# Patient Record
Sex: MALE | Race: WHITE | NOT HISPANIC OR LATINO | Employment: FULL TIME | URBAN - METROPOLITAN AREA
[De-identification: names, ages, dates, MRNs, and addresses within clinical notes are randomized per-mention and may not be internally consistent; named-entity substitution may affect disease eponyms.]

---

## 2019-02-05 ENCOUNTER — OFFICE VISIT - HEALTHEAST (OUTPATIENT)
Dept: FAMILY MEDICINE | Facility: CLINIC | Age: 49
End: 2019-02-05

## 2019-02-05 DIAGNOSIS — Z12.11 SCREEN FOR COLON CANCER: ICD-10-CM
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
## 2019-02-05 DIAGNOSIS — S22.31XD CLOSED FRACTURE OF ONE RIB OF RIGHT SIDE WITH ROUTINE HEALING, SUBSEQUENT ENCOUNTER: ICD-10-CM

## 2019-02-05 DIAGNOSIS — G89.29 CHRONIC LOW BACK PAIN, UNSPECIFIED BACK PAIN LATERALITY, WITH SCIATICA PRESENCE UNSPECIFIED: ICD-10-CM

## 2019-02-05 DIAGNOSIS — M54.5 CHRONIC LOW BACK PAIN, UNSPECIFIED BACK PAIN LATERALITY, WITH SCIATICA PRESENCE UNSPECIFIED: ICD-10-CM

## 2019-02-05 ASSESSMENT — MIFFLIN-ST. JEOR: SCORE: 2490.33

## 2019-02-06 ENCOUNTER — COMMUNICATION - HEALTHEAST (OUTPATIENT)
Dept: INTERNAL MEDICINE | Facility: CLINIC | Age: 49
End: 2019-02-06

## 2019-02-13 ENCOUNTER — AMBULATORY - HEALTHEAST (OUTPATIENT)
Dept: FAMILY MEDICINE | Facility: CLINIC | Age: 49
End: 2019-02-13

## 2019-02-13 ENCOUNTER — OFFICE VISIT - HEALTHEAST (OUTPATIENT)
Dept: FAMILY MEDICINE | Facility: CLINIC | Age: 49
End: 2019-02-13

## 2019-02-13 DIAGNOSIS — R07.9 ACUTE CHEST PAIN: ICD-10-CM

## 2019-02-13 DIAGNOSIS — R06.02 SHORTNESS OF BREATH: ICD-10-CM

## 2019-02-13 DIAGNOSIS — R06.02 SOB (SHORTNESS OF BREATH): ICD-10-CM

## 2019-02-13 DIAGNOSIS — Z13.220 LIPID SCREENING: ICD-10-CM

## 2019-02-13 DIAGNOSIS — S22.31XD CLOSED FRACTURE OF ONE RIB OF RIGHT SIDE WITH ROUTINE HEALING, SUBSEQUENT ENCOUNTER: ICD-10-CM

## 2019-02-13 DIAGNOSIS — Z13.1 SCREENING FOR DIABETES MELLITUS: ICD-10-CM

## 2019-02-13 DIAGNOSIS — I10 ESSENTIAL HYPERTENSION: ICD-10-CM

## 2019-02-13 DIAGNOSIS — E66.01 MORBID OBESITY (H): ICD-10-CM

## 2019-02-13 LAB
ANION GAP SERPL CALCULATED.3IONS-SCNC: 7 MMOL/L (ref 5–18)
BUN SERPL-MCNC: 14 MG/DL (ref 8–22)
CALCIUM SERPL-MCNC: 8.8 MG/DL (ref 8.5–10.5)
CHLORIDE BLD-SCNC: 102 MMOL/L (ref 98–107)
CHOLEST SERPL-MCNC: 190 MG/DL
CO2 SERPL-SCNC: 31 MMOL/L (ref 22–31)
CREAT SERPL-MCNC: 1.11 MG/DL (ref 0.7–1.3)
D DIMER PPP FEU-MCNC: 0.86 FEU UG/ML
FASTING STATUS PATIENT QL REPORTED: NO
GFR SERPL CREATININE-BSD FRML MDRD: >60 ML/MIN/1.73M2
GLUCOSE BLD-MCNC: 89 MG/DL (ref 70–125)
HBA1C MFR BLD: 5.4 % (ref 3.5–6)
HDLC SERPL-MCNC: 39 MG/DL
HGB BLD-MCNC: 13.7 G/DL (ref 14–18)
LDLC SERPL CALC-MCNC: 100 MG/DL
POTASSIUM BLD-SCNC: 3.9 MMOL/L (ref 3.5–5)
SODIUM SERPL-SCNC: 140 MMOL/L (ref 136–145)
TRIGL SERPL-MCNC: 253 MG/DL
TROPONIN I SERPL-MCNC: <0.01 NG/ML (ref 0–0.29)

## 2019-02-14 ENCOUNTER — COMMUNICATION - HEALTHEAST (OUTPATIENT)
Dept: FAMILY MEDICINE | Facility: CLINIC | Age: 49
End: 2019-02-14

## 2019-02-14 LAB
ATRIAL RATE - MUSE: 64 BPM
DIASTOLIC BLOOD PRESSURE - MUSE: NORMAL MMHG
INTERPRETATION ECG - MUSE: NORMAL
P AXIS - MUSE: 36 DEGREES
PR INTERVAL - MUSE: 192 MS
QRS DURATION - MUSE: 98 MS
QT - MUSE: 400 MS
QTC - MUSE: 412 MS
R AXIS - MUSE: -6 DEGREES
SYSTOLIC BLOOD PRESSURE - MUSE: NORMAL MMHG
T AXIS - MUSE: -3 DEGREES
VENTRICULAR RATE- MUSE: 64 BPM

## 2019-02-15 ENCOUNTER — HOSPITAL ENCOUNTER (OUTPATIENT)
Dept: CT IMAGING | Facility: HOSPITAL | Age: 49
Discharge: HOME | End: 2019-02-15

## 2019-02-15 ENCOUNTER — COMMUNICATION - HEALTHEAST (OUTPATIENT)
Dept: FAMILY MEDICINE | Facility: CLINIC | Age: 49
End: 2019-02-15

## 2019-02-15 DIAGNOSIS — R06.02 SOB (SHORTNESS OF BREATH): ICD-10-CM

## 2019-02-15 DIAGNOSIS — R07.9 ACUTE CHEST PAIN: ICD-10-CM

## 2019-02-18 ENCOUNTER — COMMUNICATION - HEALTHEAST (OUTPATIENT)
Dept: FAMILY MEDICINE | Facility: CLINIC | Age: 49
End: 2019-02-18

## 2019-02-19 ENCOUNTER — OFFICE VISIT - HEALTHEAST (OUTPATIENT)
Dept: FAMILY MEDICINE | Facility: CLINIC | Age: 49
End: 2019-02-19

## 2019-02-19 ENCOUNTER — COMMUNICATION - HEALTHEAST (OUTPATIENT)
Dept: FAMILY MEDICINE | Facility: CLINIC | Age: 49
End: 2019-02-19

## 2019-02-19 DIAGNOSIS — G89.29 CHRONIC LOW BACK PAIN, UNSPECIFIED BACK PAIN LATERALITY, WITH SCIATICA PRESENCE UNSPECIFIED: ICD-10-CM

## 2019-02-19 DIAGNOSIS — K21.9 GASTROESOPHAGEAL REFLUX DISEASE, ESOPHAGITIS PRESENCE NOT SPECIFIED: ICD-10-CM

## 2019-02-19 DIAGNOSIS — Z02.89 ENCOUNTER FOR COMPLETION OF FORM WITH PATIENT: ICD-10-CM

## 2019-02-19 DIAGNOSIS — J45.20 MILD INTERMITTENT ASTHMA WITHOUT COMPLICATION: ICD-10-CM

## 2019-02-19 DIAGNOSIS — K43.9 VENTRAL HERNIA WITHOUT OBSTRUCTION OR GANGRENE: ICD-10-CM

## 2019-02-19 DIAGNOSIS — S22.41XD CLOSED FRACTURE OF MULTIPLE RIBS OF RIGHT SIDE WITH ROUTINE HEALING, SUBSEQUENT ENCOUNTER: ICD-10-CM

## 2019-02-19 DIAGNOSIS — M54.5 CHRONIC LOW BACK PAIN, UNSPECIFIED BACK PAIN LATERALITY, WITH SCIATICA PRESENCE UNSPECIFIED: ICD-10-CM

## 2019-02-23 ENCOUNTER — COMMUNICATION - HEALTHEAST (OUTPATIENT)
Dept: FAMILY MEDICINE | Facility: CLINIC | Age: 49
End: 2019-02-23

## 2019-02-27 ENCOUNTER — COMMUNICATION - HEALTHEAST (OUTPATIENT)
Dept: FAMILY MEDICINE | Facility: CLINIC | Age: 49
End: 2019-02-27

## 2019-02-28 ENCOUNTER — COMMUNICATION - HEALTHEAST (OUTPATIENT)
Dept: FAMILY MEDICINE | Facility: CLINIC | Age: 49
End: 2019-02-28

## 2019-02-28 DIAGNOSIS — S22.41XD CLOSED FRACTURE OF MULTIPLE RIBS OF RIGHT SIDE WITH ROUTINE HEALING, SUBSEQUENT ENCOUNTER: ICD-10-CM

## 2019-03-13 ENCOUNTER — COMMUNICATION - HEALTHEAST (OUTPATIENT)
Dept: FAMILY MEDICINE | Facility: CLINIC | Age: 49
End: 2019-03-13

## 2019-03-13 ENCOUNTER — OFFICE VISIT - HEALTHEAST (OUTPATIENT)
Dept: FAMILY MEDICINE | Facility: CLINIC | Age: 49
End: 2019-03-13

## 2019-03-13 DIAGNOSIS — Z12.11 SCREEN FOR COLON CANCER: ICD-10-CM

## 2019-03-13 DIAGNOSIS — G89.29 CHRONIC LOW BACK PAIN, UNSPECIFIED BACK PAIN LATERALITY, WITH SCIATICA PRESENCE UNSPECIFIED: ICD-10-CM

## 2019-03-13 DIAGNOSIS — F33.1 MODERATE EPISODE OF RECURRENT MAJOR DEPRESSIVE DISORDER (H): ICD-10-CM

## 2019-03-13 DIAGNOSIS — F41.0 PANIC ATTACK: ICD-10-CM

## 2019-03-13 DIAGNOSIS — F41.9 ANXIETY: ICD-10-CM

## 2019-03-13 DIAGNOSIS — M54.5 CHRONIC LOW BACK PAIN, UNSPECIFIED BACK PAIN LATERALITY, WITH SCIATICA PRESENCE UNSPECIFIED: ICD-10-CM

## 2019-03-13 DIAGNOSIS — S22.41XD CLOSED FRACTURE OF MULTIPLE RIBS OF RIGHT SIDE WITH ROUTINE HEALING, SUBSEQUENT ENCOUNTER: ICD-10-CM

## 2019-03-13 ASSESSMENT — MIFFLIN-ST. JEOR: SCORE: 2466.52

## 2019-03-23 ENCOUNTER — HOSPITAL ENCOUNTER (OUTPATIENT)
Dept: MRI IMAGING | Facility: HOSPITAL | Age: 49
Discharge: HOME | End: 2019-03-23
Attending: FAMILY MEDICINE

## 2019-03-23 DIAGNOSIS — M54.5 CHRONIC LOW BACK PAIN, UNSPECIFIED BACK PAIN LATERALITY, WITH SCIATICA PRESENCE UNSPECIFIED: ICD-10-CM

## 2019-03-23 DIAGNOSIS — G89.29 CHRONIC LOW BACK PAIN, UNSPECIFIED BACK PAIN LATERALITY, WITH SCIATICA PRESENCE UNSPECIFIED: ICD-10-CM

## 2019-03-26 ENCOUNTER — RECORDS - HEALTHEAST (OUTPATIENT)
Dept: ADMINISTRATIVE | Facility: OTHER | Age: 49
End: 2019-03-26

## 2019-03-26 ENCOUNTER — HOSPITAL ENCOUNTER (OUTPATIENT)
Dept: PHYSICAL MEDICINE AND REHAB | Facility: CLINIC | Age: 49
Discharge: HOME | End: 2019-03-26
Attending: FAMILY MEDICINE

## 2019-03-26 DIAGNOSIS — M51.26 LUMBAR DISC HERNIATION: ICD-10-CM

## 2019-03-26 DIAGNOSIS — G89.4 CHRONIC PAIN SYNDROME: ICD-10-CM

## 2019-03-26 DIAGNOSIS — M47.816 SPONDYLOSIS OF LUMBAR REGION WITHOUT MYELOPATHY OR RADICULOPATHY: ICD-10-CM

## 2019-03-26 DIAGNOSIS — M54.16 LUMBAR RADICULITIS: ICD-10-CM

## 2019-03-26 ASSESSMENT — MIFFLIN-ST. JEOR: SCORE: 2440.1

## 2019-04-10 ENCOUNTER — COMMUNICATION - HEALTHEAST (OUTPATIENT)
Dept: FAMILY MEDICINE | Facility: CLINIC | Age: 49
End: 2019-04-10

## 2019-04-10 DIAGNOSIS — F41.9 ANXIETY: ICD-10-CM

## 2019-04-17 ENCOUNTER — COMMUNICATION - HEALTHEAST (OUTPATIENT)
Dept: FAMILY MEDICINE | Facility: CLINIC | Age: 49
End: 2019-04-17

## 2019-04-17 DIAGNOSIS — M54.5 CHRONIC LOW BACK PAIN, UNSPECIFIED BACK PAIN LATERALITY, WITH SCIATICA PRESENCE UNSPECIFIED: ICD-10-CM

## 2019-04-17 DIAGNOSIS — S22.41XD CLOSED FRACTURE OF MULTIPLE RIBS OF RIGHT SIDE WITH ROUTINE HEALING, SUBSEQUENT ENCOUNTER: ICD-10-CM

## 2019-04-17 DIAGNOSIS — G89.29 CHRONIC LOW BACK PAIN, UNSPECIFIED BACK PAIN LATERALITY, WITH SCIATICA PRESENCE UNSPECIFIED: ICD-10-CM

## 2019-04-26 ENCOUNTER — COMMUNICATION - HEALTHEAST (OUTPATIENT)
Dept: FAMILY MEDICINE | Facility: CLINIC | Age: 49
End: 2019-04-26

## 2019-04-26 DIAGNOSIS — G89.29 CHRONIC LOW BACK PAIN, UNSPECIFIED BACK PAIN LATERALITY, WITH SCIATICA PRESENCE UNSPECIFIED: ICD-10-CM

## 2019-04-26 DIAGNOSIS — M54.5 CHRONIC LOW BACK PAIN, UNSPECIFIED BACK PAIN LATERALITY, WITH SCIATICA PRESENCE UNSPECIFIED: ICD-10-CM

## 2019-04-26 DIAGNOSIS — S22.41XD CLOSED FRACTURE OF MULTIPLE RIBS OF RIGHT SIDE WITH ROUTINE HEALING, SUBSEQUENT ENCOUNTER: ICD-10-CM

## 2019-04-28 ENCOUNTER — COMMUNICATION - HEALTHEAST (OUTPATIENT)
Dept: FAMILY MEDICINE | Facility: CLINIC | Age: 49
End: 2019-04-28

## 2019-04-28 DIAGNOSIS — G89.29 CHRONIC LOW BACK PAIN, UNSPECIFIED BACK PAIN LATERALITY, WITH SCIATICA PRESENCE UNSPECIFIED: ICD-10-CM

## 2019-04-28 DIAGNOSIS — M54.5 CHRONIC LOW BACK PAIN, UNSPECIFIED BACK PAIN LATERALITY, WITH SCIATICA PRESENCE UNSPECIFIED: ICD-10-CM

## 2019-04-28 DIAGNOSIS — S22.41XD CLOSED FRACTURE OF MULTIPLE RIBS OF RIGHT SIDE WITH ROUTINE HEALING, SUBSEQUENT ENCOUNTER: ICD-10-CM

## 2019-05-21 ENCOUNTER — COMMUNICATION - HEALTHEAST (OUTPATIENT)
Dept: FAMILY MEDICINE | Facility: CLINIC | Age: 49
End: 2019-05-21

## 2019-11-29 ENCOUNTER — OFFICE VISIT - HEALTHEAST (OUTPATIENT)
Dept: FAMILY MEDICINE | Facility: CLINIC | Age: 49
End: 2019-11-29

## 2019-11-29 DIAGNOSIS — F41.9 ANXIETY: ICD-10-CM

## 2019-11-29 DIAGNOSIS — K21.9 GASTROESOPHAGEAL REFLUX DISEASE, ESOPHAGITIS PRESENCE NOT SPECIFIED: ICD-10-CM

## 2019-11-29 DIAGNOSIS — M54.16 LUMBAR RADICULITIS: ICD-10-CM

## 2019-11-29 DIAGNOSIS — Z23 ENCOUNTER FOR ADMINISTRATION OF VACCINE: ICD-10-CM

## 2019-11-29 DIAGNOSIS — M47.816 SPONDYLOSIS OF LUMBAR REGION WITHOUT MYELOPATHY OR RADICULOPATHY: ICD-10-CM

## 2019-11-29 DIAGNOSIS — J45.20 MILD INTERMITTENT ASTHMA WITHOUT COMPLICATION: ICD-10-CM

## 2019-11-29 DIAGNOSIS — E66.01 MORBID OBESITY (H): ICD-10-CM

## 2019-11-29 DIAGNOSIS — G25.81 RESTLESS LEGS SYNDROME (RLS): ICD-10-CM

## 2019-11-29 DIAGNOSIS — H65.92 OME (OTITIS MEDIA WITH EFFUSION), LEFT: ICD-10-CM

## 2019-11-29 DIAGNOSIS — M51.26 LUMBAR DISC HERNIATION: ICD-10-CM

## 2019-11-29 DIAGNOSIS — I10 ESSENTIAL HYPERTENSION: ICD-10-CM

## 2019-11-29 ASSESSMENT — ANXIETY QUESTIONNAIRES
6. BECOMING EASILY ANNOYED OR IRRITABLE: NEARLY EVERY DAY
3. WORRYING TOO MUCH ABOUT DIFFERENT THINGS: NEARLY EVERY DAY
1. FEELING NERVOUS, ANXIOUS, OR ON EDGE: NEARLY EVERY DAY
4. TROUBLE RELAXING: NEARLY EVERY DAY
2. NOT BEING ABLE TO STOP OR CONTROL WORRYING: NEARLY EVERY DAY
5. BEING SO RESTLESS THAT IT IS HARD TO SIT STILL: NEARLY EVERY DAY
7. FEELING AFRAID AS IF SOMETHING AWFUL MIGHT HAPPEN: MORE THAN HALF THE DAYS
GAD7 TOTAL SCORE: 20

## 2019-11-29 ASSESSMENT — PATIENT HEALTH QUESTIONNAIRE - PHQ9: SUM OF ALL RESPONSES TO PHQ QUESTIONS 1-9: 22

## 2019-12-05 ENCOUNTER — AMBULATORY - HEALTHEAST (OUTPATIENT)
Dept: LAB | Facility: CLINIC | Age: 49
End: 2019-12-05

## 2019-12-05 ENCOUNTER — AMBULATORY - HEALTHEAST (OUTPATIENT)
Dept: NURSING | Facility: CLINIC | Age: 49
End: 2019-12-05

## 2019-12-05 DIAGNOSIS — I10 ESSENTIAL HYPERTENSION: ICD-10-CM

## 2019-12-05 LAB
ANION GAP SERPL CALCULATED.3IONS-SCNC: 9 MMOL/L (ref 5–18)
BUN SERPL-MCNC: 13 MG/DL (ref 8–22)
CALCIUM SERPL-MCNC: 9.4 MG/DL (ref 8.5–10.5)
CHLORIDE BLD-SCNC: 103 MMOL/L (ref 98–107)
CO2 SERPL-SCNC: 28 MMOL/L (ref 22–31)
CREAT SERPL-MCNC: 1.25 MG/DL (ref 0.7–1.3)
GFR SERPL CREATININE-BSD FRML MDRD: >60 ML/MIN/1.73M2
GLUCOSE BLD-MCNC: 103 MG/DL (ref 70–125)
POTASSIUM BLD-SCNC: 3.9 MMOL/L (ref 3.5–5)
SODIUM SERPL-SCNC: 140 MMOL/L (ref 136–145)

## 2020-01-08 ENCOUNTER — COMMUNICATION - HEALTHEAST (OUTPATIENT)
Dept: FAMILY MEDICINE | Facility: CLINIC | Age: 50
End: 2020-01-08

## 2020-01-08 DIAGNOSIS — M47.816 SPONDYLOSIS OF LUMBAR REGION WITHOUT MYELOPATHY OR RADICULOPATHY: ICD-10-CM

## 2020-01-08 DIAGNOSIS — M54.16 LUMBAR RADICULITIS: ICD-10-CM

## 2020-01-08 DIAGNOSIS — M51.26 LUMBAR DISC HERNIATION: ICD-10-CM

## 2020-01-16 ENCOUNTER — COMMUNICATION - HEALTHEAST (OUTPATIENT)
Dept: FAMILY MEDICINE | Facility: CLINIC | Age: 50
End: 2020-01-16

## 2020-01-16 DIAGNOSIS — G25.81 RESTLESS LEGS SYNDROME (RLS): ICD-10-CM

## 2020-01-30 ENCOUNTER — COMMUNICATION - HEALTHEAST (OUTPATIENT)
Dept: FAMILY MEDICINE | Facility: CLINIC | Age: 50
End: 2020-01-30

## 2020-01-30 DIAGNOSIS — M51.26 LUMBAR DISC HERNIATION: ICD-10-CM

## 2020-01-30 DIAGNOSIS — M54.16 LUMBAR RADICULITIS: ICD-10-CM

## 2020-01-30 DIAGNOSIS — M47.816 SPONDYLOSIS OF LUMBAR REGION WITHOUT MYELOPATHY OR RADICULOPATHY: ICD-10-CM

## 2020-03-02 ENCOUNTER — COMMUNICATION - HEALTHEAST (OUTPATIENT)
Dept: FAMILY MEDICINE | Facility: CLINIC | Age: 50
End: 2020-03-02

## 2020-03-02 DIAGNOSIS — M47.816 SPONDYLOSIS OF LUMBAR REGION WITHOUT MYELOPATHY OR RADICULOPATHY: ICD-10-CM

## 2020-03-02 DIAGNOSIS — M54.16 LUMBAR RADICULITIS: ICD-10-CM

## 2020-03-02 DIAGNOSIS — M51.26 LUMBAR DISC HERNIATION: ICD-10-CM

## 2020-04-02 ENCOUNTER — COMMUNICATION - HEALTHEAST (OUTPATIENT)
Dept: FAMILY MEDICINE | Facility: CLINIC | Age: 50
End: 2020-04-02

## 2020-04-02 DIAGNOSIS — M47.816 SPONDYLOSIS OF LUMBAR REGION WITHOUT MYELOPATHY OR RADICULOPATHY: ICD-10-CM

## 2020-04-02 DIAGNOSIS — M51.26 LUMBAR DISC HERNIATION: ICD-10-CM

## 2020-04-02 DIAGNOSIS — M54.16 LUMBAR RADICULITIS: ICD-10-CM

## 2020-04-10 ENCOUNTER — COMMUNICATION - HEALTHEAST (OUTPATIENT)
Dept: FAMILY MEDICINE | Facility: CLINIC | Age: 50
End: 2020-04-10

## 2020-04-10 DIAGNOSIS — M51.26 LUMBAR DISC HERNIATION: ICD-10-CM

## 2020-04-10 DIAGNOSIS — M47.816 SPONDYLOSIS OF LUMBAR REGION WITHOUT MYELOPATHY OR RADICULOPATHY: ICD-10-CM

## 2020-04-10 DIAGNOSIS — M54.16 LUMBAR RADICULITIS: ICD-10-CM

## 2020-05-11 ENCOUNTER — COMMUNICATION - HEALTHEAST (OUTPATIENT)
Dept: FAMILY MEDICINE | Facility: CLINIC | Age: 50
End: 2020-05-11

## 2020-05-11 DIAGNOSIS — M54.16 LUMBAR RADICULITIS: ICD-10-CM

## 2020-05-11 DIAGNOSIS — M51.26 LUMBAR DISC HERNIATION: ICD-10-CM

## 2020-05-11 DIAGNOSIS — M47.816 SPONDYLOSIS OF LUMBAR REGION WITHOUT MYELOPATHY OR RADICULOPATHY: ICD-10-CM

## 2020-06-12 ENCOUNTER — COMMUNICATION - HEALTHEAST (OUTPATIENT)
Dept: FAMILY MEDICINE | Facility: CLINIC | Age: 50
End: 2020-06-12

## 2020-06-12 DIAGNOSIS — M47.816 SPONDYLOSIS OF LUMBAR REGION WITHOUT MYELOPATHY OR RADICULOPATHY: ICD-10-CM

## 2020-06-12 DIAGNOSIS — M54.16 LUMBAR RADICULITIS: ICD-10-CM

## 2020-06-12 DIAGNOSIS — M51.26 LUMBAR DISC HERNIATION: ICD-10-CM

## 2020-06-14 ENCOUNTER — COMMUNICATION - HEALTHEAST (OUTPATIENT)
Dept: FAMILY MEDICINE | Facility: CLINIC | Age: 50
End: 2020-06-14

## 2020-06-14 DIAGNOSIS — M54.16 LUMBAR RADICULITIS: ICD-10-CM

## 2020-06-14 DIAGNOSIS — M51.26 LUMBAR DISC HERNIATION: ICD-10-CM

## 2020-06-14 DIAGNOSIS — M47.816 SPONDYLOSIS OF LUMBAR REGION WITHOUT MYELOPATHY OR RADICULOPATHY: ICD-10-CM

## 2020-09-10 ENCOUNTER — COMMUNICATION - HEALTHEAST (OUTPATIENT)
Dept: FAMILY MEDICINE | Facility: CLINIC | Age: 50
End: 2020-09-10

## 2020-09-10 ENCOUNTER — OFFICE VISIT - HEALTHEAST (OUTPATIENT)
Dept: FAMILY MEDICINE | Facility: CLINIC | Age: 50
End: 2020-09-10

## 2020-09-10 DIAGNOSIS — F41.9 ANXIETY: ICD-10-CM

## 2020-09-10 DIAGNOSIS — Z13.1 DIABETES MELLITUS SCREENING: ICD-10-CM

## 2020-09-10 DIAGNOSIS — F33.2 SEVERE EPISODE OF RECURRENT MAJOR DEPRESSIVE DISORDER, WITHOUT PSYCHOTIC FEATURES (H): ICD-10-CM

## 2020-09-10 DIAGNOSIS — Z13.220 LIPID SCREENING: ICD-10-CM

## 2020-09-10 DIAGNOSIS — M79.7 FIBROMYALGIA: ICD-10-CM

## 2020-09-10 DIAGNOSIS — M54.16 LUMBAR RADICULITIS: ICD-10-CM

## 2020-09-10 DIAGNOSIS — K64.9 BLEEDING HEMORRHOID: ICD-10-CM

## 2020-09-10 DIAGNOSIS — M54.50 CHRONIC LOW BACK PAIN, UNSPECIFIED BACK PAIN LATERALITY, UNSPECIFIED WHETHER SCIATICA PRESENT: ICD-10-CM

## 2020-09-10 DIAGNOSIS — E66.01 MORBID OBESITY (H): ICD-10-CM

## 2020-09-10 DIAGNOSIS — M47.816 SPONDYLOSIS OF LUMBAR REGION WITHOUT MYELOPATHY OR RADICULOPATHY: ICD-10-CM

## 2020-09-10 DIAGNOSIS — Z86.2 HISTORY OF ANEMIA: ICD-10-CM

## 2020-09-10 DIAGNOSIS — Z91.51 HX OF SUICIDE ATTEMPT: ICD-10-CM

## 2020-09-10 DIAGNOSIS — I10 ESSENTIAL HYPERTENSION: ICD-10-CM

## 2020-09-10 DIAGNOSIS — J45.20 MILD INTERMITTENT ASTHMA WITHOUT COMPLICATION: ICD-10-CM

## 2020-09-10 DIAGNOSIS — M51.26 LUMBAR DISC HERNIATION: ICD-10-CM

## 2020-09-10 DIAGNOSIS — F43.10 PTSD (POST-TRAUMATIC STRESS DISORDER): ICD-10-CM

## 2020-09-10 DIAGNOSIS — G89.29 CHRONIC LOW BACK PAIN, UNSPECIFIED BACK PAIN LATERALITY, UNSPECIFIED WHETHER SCIATICA PRESENT: ICD-10-CM

## 2020-09-10 DIAGNOSIS — M75.42 IMPINGEMENT SYNDROME OF SHOULDER REGION, LEFT: ICD-10-CM

## 2020-09-10 DIAGNOSIS — Z78.9 ALCOHOL USE: ICD-10-CM

## 2020-09-10 DIAGNOSIS — Z00.00 ROUTINE GENERAL MEDICAL EXAMINATION AT A HEALTH CARE FACILITY: ICD-10-CM

## 2020-09-10 LAB
ALBUMIN SERPL-MCNC: 4.2 G/DL (ref 3.5–5)
ALP SERPL-CCNC: 81 U/L (ref 45–120)
ALT SERPL W P-5'-P-CCNC: 24 U/L (ref 0–45)
ANION GAP SERPL CALCULATED.3IONS-SCNC: 8 MMOL/L (ref 5–18)
AST SERPL W P-5'-P-CCNC: 23 U/L (ref 0–40)
BILIRUB SERPL-MCNC: 0.4 MG/DL (ref 0–1)
BUN SERPL-MCNC: 10 MG/DL (ref 8–22)
CALCIUM SERPL-MCNC: 9.3 MG/DL (ref 8.5–10.5)
CHLORIDE BLD-SCNC: 103 MMOL/L (ref 98–107)
CHOLEST SERPL-MCNC: 202 MG/DL
CO2 SERPL-SCNC: 27 MMOL/L (ref 22–31)
CREAT SERPL-MCNC: 1.08 MG/DL (ref 0.7–1.3)
FASTING STATUS PATIENT QL REPORTED: YES
GFR SERPL CREATININE-BSD FRML MDRD: >60 ML/MIN/1.73M2
GLUCOSE BLD-MCNC: 106 MG/DL (ref 70–125)
HDLC SERPL-MCNC: 43 MG/DL
HGB BLD-MCNC: 15.8 G/DL (ref 14–18)
LDLC SERPL CALC-MCNC: 125 MG/DL
POTASSIUM BLD-SCNC: 4.1 MMOL/L (ref 3.5–5)
PROT SERPL-MCNC: 6.9 G/DL (ref 6–8)
SODIUM SERPL-SCNC: 138 MMOL/L (ref 136–145)
TRIGL SERPL-MCNC: 172 MG/DL

## 2020-09-10 ASSESSMENT — ANXIETY QUESTIONNAIRES
2. NOT BEING ABLE TO STOP OR CONTROL WORRYING: NEARLY EVERY DAY
1. FEELING NERVOUS, ANXIOUS, OR ON EDGE: NEARLY EVERY DAY
5. BEING SO RESTLESS THAT IT IS HARD TO SIT STILL: NEARLY EVERY DAY
6. BECOMING EASILY ANNOYED OR IRRITABLE: NEARLY EVERY DAY
GAD7 TOTAL SCORE: 21
3. WORRYING TOO MUCH ABOUT DIFFERENT THINGS: NEARLY EVERY DAY
7. FEELING AFRAID AS IF SOMETHING AWFUL MIGHT HAPPEN: NEARLY EVERY DAY
4. TROUBLE RELAXING: NEARLY EVERY DAY

## 2020-09-10 ASSESSMENT — MIFFLIN-ST. JEOR: SCORE: 2426.65

## 2020-09-10 ASSESSMENT — PATIENT HEALTH QUESTIONNAIRE - PHQ9: SUM OF ALL RESPONSES TO PHQ QUESTIONS 1-9: 26

## 2020-09-17 ENCOUNTER — OFFICE VISIT - HEALTHEAST (OUTPATIENT)
Dept: FAMILY MEDICINE | Facility: CLINIC | Age: 50
End: 2020-09-17

## 2020-09-17 DIAGNOSIS — I10 ESSENTIAL HYPERTENSION: ICD-10-CM

## 2020-09-17 RX ORDER — LISINOPRIL 20 MG/1
20 TABLET ORAL DAILY
Qty: 30 TABLET | Refills: 11 | Status: SHIPPED | OUTPATIENT
Start: 2020-09-17 | End: 2021-10-06

## 2020-09-24 ENCOUNTER — AMBULATORY - HEALTHEAST (OUTPATIENT)
Dept: NURSING | Facility: CLINIC | Age: 50
End: 2020-09-24

## 2020-09-24 DIAGNOSIS — I10 ESSENTIAL HYPERTENSION: ICD-10-CM

## 2020-10-22 ENCOUNTER — COMMUNICATION - HEALTHEAST (OUTPATIENT)
Dept: FAMILY MEDICINE | Facility: CLINIC | Age: 50
End: 2020-10-22

## 2020-10-22 DIAGNOSIS — M47.816 SPONDYLOSIS OF LUMBAR REGION WITHOUT MYELOPATHY OR RADICULOPATHY: ICD-10-CM

## 2020-10-22 DIAGNOSIS — M51.26 LUMBAR DISC HERNIATION: ICD-10-CM

## 2020-10-22 DIAGNOSIS — M54.16 LUMBAR RADICULITIS: ICD-10-CM

## 2020-11-10 ENCOUNTER — COMMUNICATION - HEALTHEAST (OUTPATIENT)
Dept: FAMILY MEDICINE | Facility: CLINIC | Age: 50
End: 2020-11-10

## 2020-11-10 ENCOUNTER — OFFICE VISIT - HEALTHEAST (OUTPATIENT)
Dept: FAMILY MEDICINE | Facility: CLINIC | Age: 50
End: 2020-11-10

## 2020-11-10 DIAGNOSIS — M51.26 LUMBAR DISC HERNIATION: ICD-10-CM

## 2020-11-10 DIAGNOSIS — F33.2 SEVERE EPISODE OF RECURRENT MAJOR DEPRESSIVE DISORDER, WITHOUT PSYCHOTIC FEATURES (H): ICD-10-CM

## 2020-11-10 DIAGNOSIS — M54.16 LUMBAR RADICULITIS: ICD-10-CM

## 2020-11-10 DIAGNOSIS — M79.7 FIBROMYALGIA: ICD-10-CM

## 2020-11-10 DIAGNOSIS — M47.816 SPONDYLOSIS OF LUMBAR REGION WITHOUT MYELOPATHY OR RADICULOPATHY: ICD-10-CM

## 2020-11-10 DIAGNOSIS — M54.50 CHRONIC LOW BACK PAIN, UNSPECIFIED BACK PAIN LATERALITY, UNSPECIFIED WHETHER SCIATICA PRESENT: ICD-10-CM

## 2020-11-10 DIAGNOSIS — G89.29 CHRONIC LOW BACK PAIN, UNSPECIFIED BACK PAIN LATERALITY, UNSPECIFIED WHETHER SCIATICA PRESENT: ICD-10-CM

## 2020-11-10 DIAGNOSIS — F43.10 PTSD (POST-TRAUMATIC STRESS DISORDER): ICD-10-CM

## 2020-11-10 DIAGNOSIS — I10 ESSENTIAL HYPERTENSION: ICD-10-CM

## 2020-11-10 DIAGNOSIS — F41.9 ANXIETY: ICD-10-CM

## 2020-11-10 DIAGNOSIS — Z91.51 HX OF SUICIDE ATTEMPT: ICD-10-CM

## 2020-11-10 LAB
ANION GAP SERPL CALCULATED.3IONS-SCNC: 10 MMOL/L (ref 5–18)
BUN SERPL-MCNC: 14 MG/DL (ref 8–22)
CALCIUM SERPL-MCNC: 9.5 MG/DL (ref 8.5–10.5)
CHLORIDE BLD-SCNC: 103 MMOL/L (ref 98–107)
CO2 SERPL-SCNC: 27 MMOL/L (ref 22–31)
CREAT SERPL-MCNC: 1.35 MG/DL (ref 0.7–1.3)
GFR SERPL CREATININE-BSD FRML MDRD: 56 ML/MIN/1.73M2
GLUCOSE BLD-MCNC: 104 MG/DL (ref 70–125)
POTASSIUM BLD-SCNC: 4 MMOL/L (ref 3.5–5)
SODIUM SERPL-SCNC: 140 MMOL/L (ref 136–145)

## 2020-11-11 ENCOUNTER — AMBULATORY - HEALTHEAST (OUTPATIENT)
Dept: FAMILY MEDICINE | Facility: CLINIC | Age: 50
End: 2020-11-11

## 2020-11-11 ENCOUNTER — COMMUNICATION - HEALTHEAST (OUTPATIENT)
Dept: FAMILY MEDICINE | Facility: CLINIC | Age: 50
End: 2020-11-11

## 2020-11-11 DIAGNOSIS — I10 ESSENTIAL HYPERTENSION: ICD-10-CM

## 2020-11-11 DIAGNOSIS — R79.89 ELEVATED SERUM CREATININE: ICD-10-CM

## 2020-12-16 ENCOUNTER — COMMUNICATION - HEALTHEAST (OUTPATIENT)
Dept: FAMILY MEDICINE | Facility: CLINIC | Age: 50
End: 2020-12-16

## 2020-12-16 DIAGNOSIS — M51.26 LUMBAR DISC HERNIATION: ICD-10-CM

## 2020-12-16 DIAGNOSIS — M47.816 SPONDYLOSIS OF LUMBAR REGION WITHOUT MYELOPATHY OR RADICULOPATHY: ICD-10-CM

## 2020-12-16 DIAGNOSIS — M54.16 LUMBAR RADICULITIS: ICD-10-CM

## 2020-12-18 ENCOUNTER — COMMUNICATION - HEALTHEAST (OUTPATIENT)
Dept: FAMILY MEDICINE | Facility: CLINIC | Age: 50
End: 2020-12-18

## 2020-12-18 DIAGNOSIS — M54.16 LUMBAR RADICULITIS: ICD-10-CM

## 2020-12-18 DIAGNOSIS — M51.26 LUMBAR DISC HERNIATION: ICD-10-CM

## 2020-12-18 DIAGNOSIS — M47.816 SPONDYLOSIS OF LUMBAR REGION WITHOUT MYELOPATHY OR RADICULOPATHY: ICD-10-CM

## 2020-12-24 ENCOUNTER — RECORDS - HEALTHEAST (OUTPATIENT)
Dept: ADMINISTRATIVE | Facility: OTHER | Age: 50
End: 2020-12-24

## 2021-01-06 ENCOUNTER — HOSPITAL ENCOUNTER (OUTPATIENT)
Dept: PALLIATIVE MEDICINE | Facility: OTHER | Age: 51
Discharge: HOME | End: 2021-01-06
Attending: FAMILY MEDICINE

## 2021-01-06 ENCOUNTER — COMMUNICATION - HEALTHEAST (OUTPATIENT)
Dept: FAMILY MEDICINE | Facility: CLINIC | Age: 51
End: 2021-01-06

## 2021-01-06 DIAGNOSIS — M54.16 LUMBAR RADICULITIS: ICD-10-CM

## 2021-01-06 DIAGNOSIS — G89.4 CHRONIC PAIN SYNDROME: ICD-10-CM

## 2021-01-06 DIAGNOSIS — M51.26 LUMBAR DISC HERNIATION: ICD-10-CM

## 2021-01-06 DIAGNOSIS — M47.816 SPONDYLOSIS OF LUMBAR REGION WITHOUT MYELOPATHY OR RADICULOPATHY: ICD-10-CM

## 2021-01-17 ENCOUNTER — COMMUNICATION - HEALTHEAST (OUTPATIENT)
Dept: FAMILY MEDICINE | Facility: CLINIC | Age: 51
End: 2021-01-17

## 2021-01-17 DIAGNOSIS — F41.9 ANXIETY: ICD-10-CM

## 2021-01-20 ENCOUNTER — OFFICE VISIT - HEALTHEAST (OUTPATIENT)
Dept: FAMILY MEDICINE | Facility: CLINIC | Age: 51
End: 2021-01-20

## 2021-01-20 DIAGNOSIS — L40.9 PSORIASIS: ICD-10-CM

## 2021-01-20 DIAGNOSIS — I10 ESSENTIAL HYPERTENSION: ICD-10-CM

## 2021-01-20 DIAGNOSIS — G89.4 CHRONIC PAIN SYNDROME: ICD-10-CM

## 2021-01-20 LAB
ANION GAP SERPL CALCULATED.3IONS-SCNC: 11 MMOL/L (ref 5–18)
BUN SERPL-MCNC: 12 MG/DL (ref 8–22)
C REACTIVE PROTEIN LHE: 1 MG/DL (ref 0–0.8)
CALCIUM SERPL-MCNC: 9.2 MG/DL (ref 8.5–10.5)
CHLORIDE BLD-SCNC: 100 MMOL/L (ref 98–107)
CO2 SERPL-SCNC: 27 MMOL/L (ref 22–31)
CREAT SERPL-MCNC: 1.18 MG/DL (ref 0.7–1.3)
GFR SERPL CREATININE-BSD FRML MDRD: >60 ML/MIN/1.73M2
GLUCOSE BLD-MCNC: 104 MG/DL (ref 70–125)
POTASSIUM BLD-SCNC: 3.8 MMOL/L (ref 3.5–5)
SODIUM SERPL-SCNC: 138 MMOL/L (ref 136–145)
T3FREE SERPL-MCNC: 2.8 PG/ML (ref 1.9–3.9)
T4 FREE SERPL-MCNC: 0.9 NG/DL (ref 0.7–1.8)
TSH SERPL DL<=0.005 MIU/L-ACNC: 1.63 UIU/ML (ref 0.3–5)

## 2021-01-20 RX ORDER — HYDROCHLOROTHIAZIDE 25 MG/1
25 TABLET ORAL DAILY
Qty: 90 TABLET | Refills: 3 | Status: SHIPPED | OUTPATIENT
Start: 2021-01-20 | End: 2022-01-31

## 2021-01-20 ASSESSMENT — MIFFLIN-ST. JEOR: SCORE: 2416.16

## 2021-01-21 ENCOUNTER — COMMUNICATION - HEALTHEAST (OUTPATIENT)
Dept: FAMILY MEDICINE | Facility: CLINIC | Age: 51
End: 2021-01-21

## 2021-01-21 DIAGNOSIS — M51.26 LUMBAR DISC HERNIATION: ICD-10-CM

## 2021-01-21 DIAGNOSIS — M47.816 SPONDYLOSIS OF LUMBAR REGION WITHOUT MYELOPATHY OR RADICULOPATHY: ICD-10-CM

## 2021-01-21 DIAGNOSIS — M54.16 LUMBAR RADICULITIS: ICD-10-CM

## 2021-01-21 LAB — 25(OH)D3 SERPL-MCNC: 7.4 NG/ML (ref 30–80)

## 2021-01-23 LAB — TSH RECEP AB SER-ACNC: <0.9 IU/L

## 2021-01-24 LAB — T3REVERSE SERPL-MCNC: 10.9 NG/DL (ref 9–27)

## 2021-01-26 ENCOUNTER — COMMUNICATION - HEALTHEAST (OUTPATIENT)
Dept: PALLIATIVE MEDICINE | Facility: OTHER | Age: 51
End: 2021-01-26

## 2021-01-26 LAB
DQA1*: NORMAL
DQA1*LOCUS: NORMAL
DQB1* LOCUS: NORMAL
DQB1*: NORMAL
DRSSO COMMENTS: NORMAL
DRSSO TEST METHOD: NORMAL

## 2021-01-27 LAB
GLIADIN IGA SER-ACNC: 1.2 U/ML
GLIADIN IGG SER-ACNC: 0.9 U/ML
IGA SERPL-MCNC: 192 MG/DL (ref 65–400)
TTG IGA SER-ACNC: 0.4 U/ML
TTG IGG SER-ACNC: <0.6 U/ML

## 2021-02-02 ENCOUNTER — OFFICE VISIT - HEALTHEAST (OUTPATIENT)
Dept: FAMILY MEDICINE | Facility: CLINIC | Age: 51
End: 2021-02-02

## 2021-02-02 DIAGNOSIS — S29.011D INTERCOSTAL MUSCLE STRAIN, SUBSEQUENT ENCOUNTER: ICD-10-CM

## 2021-02-12 ENCOUNTER — COMMUNICATION - HEALTHEAST (OUTPATIENT)
Dept: LAB | Facility: CLINIC | Age: 51
End: 2021-02-12

## 2021-02-12 DIAGNOSIS — M47.816 SPONDYLOSIS OF LUMBAR REGION WITHOUT MYELOPATHY OR RADICULOPATHY: ICD-10-CM

## 2021-02-12 DIAGNOSIS — M51.26 LUMBAR DISC HERNIATION: ICD-10-CM

## 2021-02-12 DIAGNOSIS — M54.16 LUMBAR RADICULITIS: ICD-10-CM

## 2021-02-15 ENCOUNTER — HOSPITAL ENCOUNTER (OUTPATIENT)
Dept: PALLIATIVE MEDICINE | Facility: OTHER | Age: 51
Discharge: HOME | End: 2021-02-15
Attending: ANESTHESIOLOGY

## 2021-02-15 ENCOUNTER — COMMUNICATION - HEALTHEAST (OUTPATIENT)
Dept: PALLIATIVE MEDICINE | Facility: OTHER | Age: 51
End: 2021-02-15

## 2021-02-15 DIAGNOSIS — M79.7 FIBROMYALGIA: ICD-10-CM

## 2021-02-15 DIAGNOSIS — M54.50 CHRONIC LOW BACK PAIN, UNSPECIFIED BACK PAIN LATERALITY, UNSPECIFIED WHETHER SCIATICA PRESENT: ICD-10-CM

## 2021-02-15 DIAGNOSIS — G89.29 CHRONIC LOW BACK PAIN, UNSPECIFIED BACK PAIN LATERALITY, UNSPECIFIED WHETHER SCIATICA PRESENT: ICD-10-CM

## 2021-02-15 DIAGNOSIS — G89.4 CHRONIC PAIN SYNDROME: ICD-10-CM

## 2021-02-15 ASSESSMENT — MIFFLIN-ST. JEOR: SCORE: 2407.55

## 2021-02-16 ENCOUNTER — COMMUNICATION - HEALTHEAST (OUTPATIENT)
Dept: FAMILY MEDICINE | Facility: CLINIC | Age: 51
End: 2021-02-16

## 2021-02-16 DIAGNOSIS — S29.011D INTERCOSTAL MUSCLE STRAIN, SUBSEQUENT ENCOUNTER: ICD-10-CM

## 2021-02-28 ENCOUNTER — COMMUNICATION - HEALTHEAST (OUTPATIENT)
Dept: FAMILY MEDICINE | Facility: CLINIC | Age: 51
End: 2021-02-28

## 2021-02-28 ENCOUNTER — COMMUNICATION - HEALTHEAST (OUTPATIENT)
Dept: PALLIATIVE MEDICINE | Facility: OTHER | Age: 51
End: 2021-02-28

## 2021-02-28 DIAGNOSIS — M79.7 FIBROMYALGIA: ICD-10-CM

## 2021-02-28 DIAGNOSIS — G25.81 RESTLESS LEGS SYNDROME (RLS): ICD-10-CM

## 2021-03-01 ENCOUNTER — COMMUNICATION - HEALTHEAST (OUTPATIENT)
Dept: PALLIATIVE MEDICINE | Facility: OTHER | Age: 51
End: 2021-03-01

## 2021-03-01 ENCOUNTER — COMMUNICATION - HEALTHEAST (OUTPATIENT)
Dept: FAMILY MEDICINE | Facility: CLINIC | Age: 51
End: 2021-03-01

## 2021-03-01 DIAGNOSIS — M54.16 LUMBAR RADICULITIS: ICD-10-CM

## 2021-03-01 DIAGNOSIS — S29.011D INTERCOSTAL MUSCLE STRAIN, SUBSEQUENT ENCOUNTER: ICD-10-CM

## 2021-03-01 DIAGNOSIS — M51.26 LUMBAR DISC HERNIATION: ICD-10-CM

## 2021-03-01 DIAGNOSIS — M47.816 SPONDYLOSIS OF LUMBAR REGION WITHOUT MYELOPATHY OR RADICULOPATHY: ICD-10-CM

## 2021-03-01 RX ORDER — PRAMIPEXOLE DIHYDROCHLORIDE 0.25 MG/1
0.25 TABLET ORAL 2 TIMES DAILY
Qty: 180 TABLET | Refills: 1 | Status: SHIPPED | OUTPATIENT
Start: 2021-03-01 | End: 2022-04-25

## 2021-03-02 ENCOUNTER — RECORDS - HEALTHEAST (OUTPATIENT)
Dept: PALLIATIVE MEDICINE | Facility: OTHER | Age: 51
End: 2021-03-02

## 2021-03-02 DIAGNOSIS — M47.816 FACET ARTHROPATHY, LUMBAR: ICD-10-CM

## 2021-03-15 ENCOUNTER — COMMUNICATION - HEALTHEAST (OUTPATIENT)
Dept: FAMILY MEDICINE | Facility: CLINIC | Age: 51
End: 2021-03-15

## 2021-03-15 DIAGNOSIS — J45.20 MILD INTERMITTENT ASTHMA WITHOUT COMPLICATION: ICD-10-CM

## 2021-03-16 RX ORDER — ALBUTEROL SULFATE 90 UG/1
2 AEROSOL, METERED RESPIRATORY (INHALATION) EVERY 4 HOURS PRN
Qty: 8.5 G | Refills: 5 | Status: SHIPPED | OUTPATIENT
Start: 2021-03-16 | End: 2022-01-27

## 2021-03-25 ENCOUNTER — COMMUNICATION - HEALTHEAST (OUTPATIENT)
Dept: FAMILY MEDICINE | Facility: CLINIC | Age: 51
End: 2021-03-25

## 2021-03-25 ENCOUNTER — COMMUNICATION - HEALTHEAST (OUTPATIENT)
Dept: PALLIATIVE MEDICINE | Facility: OTHER | Age: 51
End: 2021-03-25

## 2021-03-25 DIAGNOSIS — M47.816 SPONDYLOSIS OF LUMBAR REGION WITHOUT MYELOPATHY OR RADICULOPATHY: ICD-10-CM

## 2021-03-25 DIAGNOSIS — S29.011D INTERCOSTAL MUSCLE STRAIN, SUBSEQUENT ENCOUNTER: ICD-10-CM

## 2021-03-25 DIAGNOSIS — M51.26 LUMBAR DISC HERNIATION: ICD-10-CM

## 2021-03-25 DIAGNOSIS — M54.16 LUMBAR RADICULITIS: ICD-10-CM

## 2021-03-25 DIAGNOSIS — G89.4 CHRONIC PAIN SYNDROME: ICD-10-CM

## 2021-03-26 ENCOUNTER — HOSPITAL ENCOUNTER (OUTPATIENT)
Dept: PALLIATIVE MEDICINE | Facility: OTHER | Age: 51
Discharge: HOME | End: 2021-03-26
Attending: ANESTHESIOLOGY

## 2021-03-26 DIAGNOSIS — M54.50 CHRONIC LOW BACK PAIN, UNSPECIFIED BACK PAIN LATERALITY, UNSPECIFIED WHETHER SCIATICA PRESENT: ICD-10-CM

## 2021-03-26 DIAGNOSIS — G89.4 CHRONIC PAIN SYNDROME: ICD-10-CM

## 2021-03-26 DIAGNOSIS — G89.29 CHRONIC LOW BACK PAIN, UNSPECIFIED BACK PAIN LATERALITY, UNSPECIFIED WHETHER SCIATICA PRESENT: ICD-10-CM

## 2021-03-26 ASSESSMENT — MIFFLIN-ST. JEOR: SCORE: 2393.94

## 2021-04-12 ENCOUNTER — COMMUNICATION - HEALTHEAST (OUTPATIENT)
Dept: PALLIATIVE MEDICINE | Facility: OTHER | Age: 51
End: 2021-04-12

## 2021-04-12 ENCOUNTER — COMMUNICATION - HEALTHEAST (OUTPATIENT)
Dept: FAMILY MEDICINE | Facility: CLINIC | Age: 51
End: 2021-04-12

## 2021-04-12 DIAGNOSIS — S29.011D INTERCOSTAL MUSCLE STRAIN, SUBSEQUENT ENCOUNTER: ICD-10-CM

## 2021-04-12 DIAGNOSIS — M54.50 CHRONIC LOW BACK PAIN, UNSPECIFIED BACK PAIN LATERALITY, UNSPECIFIED WHETHER SCIATICA PRESENT: ICD-10-CM

## 2021-04-12 DIAGNOSIS — G89.29 CHRONIC LOW BACK PAIN, UNSPECIFIED BACK PAIN LATERALITY, UNSPECIFIED WHETHER SCIATICA PRESENT: ICD-10-CM

## 2021-04-18 ENCOUNTER — COMMUNICATION - HEALTHEAST (OUTPATIENT)
Dept: FAMILY MEDICINE | Facility: CLINIC | Age: 51
End: 2021-04-18

## 2021-04-18 DIAGNOSIS — M47.816 SPONDYLOSIS OF LUMBAR REGION WITHOUT MYELOPATHY OR RADICULOPATHY: ICD-10-CM

## 2021-04-18 DIAGNOSIS — M54.16 LUMBAR RADICULITIS: ICD-10-CM

## 2021-04-18 DIAGNOSIS — M51.26 LUMBAR DISC HERNIATION: ICD-10-CM

## 2021-04-23 ENCOUNTER — COMMUNICATION - HEALTHEAST (OUTPATIENT)
Dept: PALLIATIVE MEDICINE | Facility: OTHER | Age: 51
End: 2021-04-23

## 2021-04-23 DIAGNOSIS — M79.7 FIBROMYALGIA: ICD-10-CM

## 2021-04-26 ENCOUNTER — COMMUNICATION - HEALTHEAST (OUTPATIENT)
Dept: FAMILY MEDICINE | Facility: CLINIC | Age: 51
End: 2021-04-26

## 2021-04-26 DIAGNOSIS — S29.011D INTERCOSTAL MUSCLE STRAIN, SUBSEQUENT ENCOUNTER: ICD-10-CM

## 2021-04-27 ENCOUNTER — COMMUNICATION - HEALTHEAST (OUTPATIENT)
Dept: PALLIATIVE MEDICINE | Facility: OTHER | Age: 51
End: 2021-04-27
Payer: COMMERCIAL

## 2021-04-28 ENCOUNTER — COMMUNICATION - HEALTHEAST (OUTPATIENT)
Dept: PALLIATIVE MEDICINE | Facility: OTHER | Age: 51
End: 2021-04-28

## 2021-04-28 DIAGNOSIS — G89.29 CHRONIC LOW BACK PAIN, UNSPECIFIED BACK PAIN LATERALITY, UNSPECIFIED WHETHER SCIATICA PRESENT: ICD-10-CM

## 2021-04-28 DIAGNOSIS — M54.50 CHRONIC LOW BACK PAIN, UNSPECIFIED BACK PAIN LATERALITY, UNSPECIFIED WHETHER SCIATICA PRESENT: ICD-10-CM

## 2021-05-10 ENCOUNTER — COMMUNICATION - HEALTHEAST (OUTPATIENT)
Dept: FAMILY MEDICINE | Facility: CLINIC | Age: 51
End: 2021-05-10

## 2021-05-10 DIAGNOSIS — S29.011D INTERCOSTAL MUSCLE STRAIN, SUBSEQUENT ENCOUNTER: ICD-10-CM

## 2021-05-10 DIAGNOSIS — M51.26 LUMBAR DISC HERNIATION: ICD-10-CM

## 2021-05-10 DIAGNOSIS — M47.816 SPONDYLOSIS OF LUMBAR REGION WITHOUT MYELOPATHY OR RADICULOPATHY: ICD-10-CM

## 2021-05-10 DIAGNOSIS — M54.16 LUMBAR RADICULITIS: ICD-10-CM

## 2021-05-14 ENCOUNTER — COMMUNICATION - HEALTHEAST (OUTPATIENT)
Dept: PALLIATIVE MEDICINE | Facility: OTHER | Age: 51
End: 2021-05-14

## 2021-05-14 DIAGNOSIS — M54.50 CHRONIC LOW BACK PAIN, UNSPECIFIED BACK PAIN LATERALITY, UNSPECIFIED WHETHER SCIATICA PRESENT: ICD-10-CM

## 2021-05-14 DIAGNOSIS — G89.29 CHRONIC LOW BACK PAIN, UNSPECIFIED BACK PAIN LATERALITY, UNSPECIFIED WHETHER SCIATICA PRESENT: ICD-10-CM

## 2021-05-18 ENCOUNTER — HOSPITAL ENCOUNTER (OUTPATIENT)
Dept: PALLIATIVE MEDICINE | Facility: OTHER | Age: 51
Discharge: HOME | End: 2021-05-18
Attending: ANESTHESIOLOGY

## 2021-05-18 DIAGNOSIS — M54.50 CHRONIC LOW BACK PAIN, UNSPECIFIED BACK PAIN LATERALITY, UNSPECIFIED WHETHER SCIATICA PRESENT: ICD-10-CM

## 2021-05-18 DIAGNOSIS — G89.29 CHRONIC LOW BACK PAIN, UNSPECIFIED BACK PAIN LATERALITY, UNSPECIFIED WHETHER SCIATICA PRESENT: ICD-10-CM

## 2021-05-18 RX ORDER — KETOROLAC TROMETHAMINE 30 MG/ML
30 INJECTION, SOLUTION INTRAMUSCULAR; INTRAVENOUS 3 TIMES DAILY PRN
Qty: 8 ML | Refills: 2 | Status: SHIPPED | OUTPATIENT
Start: 2021-05-18 | End: 2021-08-18

## 2021-05-18 RX ORDER — KETOROLAC TROMETHAMINE 30 MG/ML
INJECTION, SOLUTION INTRAMUSCULAR; INTRAVENOUS
Status: SHIPPED | COMMUNITY
Start: 2021-04-28 | End: 2021-07-16

## 2021-05-18 RX ORDER — CHLORAL HYDRATE 500 MG
2 CAPSULE ORAL DAILY
Status: SHIPPED | COMMUNITY
Start: 2021-05-18 | End: 2022-10-17

## 2021-05-19 ENCOUNTER — RECORDS - HEALTHEAST (OUTPATIENT)
Dept: ADMINISTRATIVE | Facility: OTHER | Age: 51
End: 2021-05-19

## 2021-05-19 ENCOUNTER — COMMUNICATION - HEALTHEAST (OUTPATIENT)
Dept: FAMILY MEDICINE | Facility: CLINIC | Age: 51
End: 2021-05-19

## 2021-05-19 ENCOUNTER — OFFICE VISIT - HEALTHEAST (OUTPATIENT)
Dept: FAMILY MEDICINE | Facility: CLINIC | Age: 51
End: 2021-05-19

## 2021-05-19 ENCOUNTER — RECORDS - HEALTHEAST (OUTPATIENT)
Dept: GENERAL RADIOLOGY | Facility: CLINIC | Age: 51
End: 2021-05-19

## 2021-05-19 DIAGNOSIS — M54.50 CHRONIC LOW BACK PAIN, UNSPECIFIED BACK PAIN LATERALITY, UNSPECIFIED WHETHER SCIATICA PRESENT: ICD-10-CM

## 2021-05-19 DIAGNOSIS — F33.2 SEVERE EPISODE OF RECURRENT MAJOR DEPRESSIVE DISORDER, WITHOUT PSYCHOTIC FEATURES (H): ICD-10-CM

## 2021-05-19 DIAGNOSIS — G89.29 CHRONIC LOW BACK PAIN, UNSPECIFIED BACK PAIN LATERALITY, UNSPECIFIED WHETHER SCIATICA PRESENT: ICD-10-CM

## 2021-05-19 DIAGNOSIS — F41.9 ANXIETY: ICD-10-CM

## 2021-05-19 DIAGNOSIS — G89.29 OTHER CHRONIC PAIN: ICD-10-CM

## 2021-05-19 DIAGNOSIS — S69.91XA INJURY OF RIGHT HAND, INITIAL ENCOUNTER: ICD-10-CM

## 2021-05-19 DIAGNOSIS — Z13.1 DIABETES MELLITUS SCREENING: ICD-10-CM

## 2021-05-19 DIAGNOSIS — S69.91XA UNSPECIFIED INJURY OF RIGHT WRIST, HAND AND FINGER(S), INITIAL ENCOUNTER: ICD-10-CM

## 2021-05-19 DIAGNOSIS — G62.9 NEUROPATHY: ICD-10-CM

## 2021-05-19 LAB — HBA1C MFR BLD: 5.5 %

## 2021-05-19 RX ORDER — MELATONIN 10 MG
1 CAPSULE ORAL AT BEDTIME
Qty: 30 CAPSULE | Refills: 2 | Status: SHIPPED | OUTPATIENT
Start: 2021-05-19 | End: 2021-07-16

## 2021-05-20 ENCOUNTER — AMBULATORY - HEALTHEAST (OUTPATIENT)
Dept: FAMILY MEDICINE | Facility: CLINIC | Age: 51
End: 2021-05-20

## 2021-05-20 ENCOUNTER — COMMUNICATION - HEALTHEAST (OUTPATIENT)
Dept: FAMILY MEDICINE | Facility: CLINIC | Age: 51
End: 2021-05-20
Payer: COMMERCIAL

## 2021-05-20 DIAGNOSIS — G62.9 NEUROPATHY: ICD-10-CM

## 2021-05-20 LAB
MAGNESIUM SERPL-MCNC: 1.9 MG/DL (ref 1.8–2.6)
TSH SERPL DL<=0.005 MIU/L-ACNC: 1.97 UIU/ML (ref 0.3–5)
VIT B12 SERPL-MCNC: 361 PG/ML (ref 213–816)

## 2021-05-21 ENCOUNTER — COMMUNICATION - HEALTHEAST (OUTPATIENT)
Dept: NURSING | Facility: CLINIC | Age: 51
End: 2021-05-21

## 2021-05-21 ENCOUNTER — COMMUNICATION - HEALTHEAST (OUTPATIENT)
Dept: FAMILY MEDICINE | Facility: CLINIC | Age: 51
End: 2021-05-21

## 2021-05-21 LAB
ALBUMIN SERPL-MCNC: 4.2 G/DL (ref 3.5–5)
ALP SERPL-CCNC: 74 U/L (ref 45–120)
ALT SERPL W P-5'-P-CCNC: 46 U/L (ref 0–45)
ANION GAP SERPL CALCULATED.3IONS-SCNC: 12 MMOL/L (ref 5–18)
AST SERPL W P-5'-P-CCNC: 51 U/L (ref 0–40)
BILIRUB SERPL-MCNC: 0.4 MG/DL (ref 0–1)
BUN SERPL-MCNC: 18 MG/DL (ref 8–22)
CALCIUM SERPL-MCNC: 9.2 MG/DL (ref 8.5–10.5)
CHLORIDE BLD-SCNC: 104 MMOL/L (ref 98–107)
CO2 SERPL-SCNC: 22 MMOL/L (ref 22–31)
CREAT SERPL-MCNC: 1.18 MG/DL (ref 0.7–1.3)
GFR SERPL CREATININE-BSD FRML MDRD: >60 ML/MIN/1.73M2
GLUCOSE BLD-MCNC: 104 MG/DL (ref 70–125)
POTASSIUM BLD-SCNC: 3.6 MMOL/L (ref 3.5–5)
PROT SERPL-MCNC: 6.8 G/DL (ref 6–8)
SODIUM SERPL-SCNC: 138 MMOL/L (ref 136–145)

## 2021-05-24 ENCOUNTER — COMMUNICATION - HEALTHEAST (OUTPATIENT)
Dept: NURSING | Facility: CLINIC | Age: 51
End: 2021-05-24

## 2021-05-24 ASSESSMENT — ACTIVITIES OF DAILY LIVING (ADL): DEPENDENT_IADLS:: INDEPENDENT

## 2021-05-26 VITALS — SYSTOLIC BLOOD PRESSURE: 134 MMHG | DIASTOLIC BLOOD PRESSURE: 78 MMHG

## 2021-05-26 ASSESSMENT — PATIENT HEALTH QUESTIONNAIRE - PHQ9: SUM OF ALL RESPONSES TO PHQ QUESTIONS 1-9: 22

## 2021-05-27 ENCOUNTER — COMMUNICATION - HEALTHEAST (OUTPATIENT)
Dept: NURSING | Facility: CLINIC | Age: 51
End: 2021-05-27

## 2021-05-27 VITALS
HEART RATE: 91 BPM | DIASTOLIC BLOOD PRESSURE: 80 MMHG | DIASTOLIC BLOOD PRESSURE: 80 MMHG | SYSTOLIC BLOOD PRESSURE: 138 MMHG | SYSTOLIC BLOOD PRESSURE: 144 MMHG

## 2021-05-27 VITALS — BODY MASS INDEX: 39.43 KG/M2 | WEIGHT: 307.13 LBS

## 2021-05-27 VITALS — HEIGHT: 74 IN
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
## 2021-05-27 NOTE — TELEPHONE ENCOUNTER
RN cannot approve Refill Request    RN can NOT refill this medication med is not covered by policy/route to provider. Last office visit: 3/13/2019 Harriet Vo MD Last Physical: Visit date not found Last MTM visit: Visit date not found Last visit same specialty: 3/13/2019 Harriet Vo MD.  Next visit within 3 mo: Visit date not found  Next physical within 3 mo: Visit date not found      Patria Cisneros, [facility] Triage/Med Refill 4/17/2019    Requested Prescriptions   Pending Prescriptions Disp Refills     cyclobenzaprine (FLEXERIL) 10 MG tablet [Pharmacy Med Name: CYCLOBENZAPRINE 10MG TABLETS] 30 tablet 0     Sig: TAKE 1 TABLET(10 MG) BY MOUTH TWICE DAILY AS NEEDED FOR MUSCLE SPASMS       There is no refill protocol information for this order

## 2021-05-27 NOTE — TELEPHONE ENCOUNTER
Refill Approved    Rx renewed per Medication Renewal Policy. Medication was last renewed on 3/14/19.    Hilda Godinez, [facility] Triage/Med Refill 4/11/2019     Requested Prescriptions   Pending Prescriptions Disp Refills     hydrOXYzine HCl (ATARAX) 25 MG tablet [Pharmacy Med Name: HYDROXYZINE HCL 25MG TABS (WHITE)] 60 tablet 0     Sig: TAKE 1 TABLET BY MOUTH THREE TIMES DAILY AS NEEDED FOR ANXIETY(PANIC ATTACK)       Antihistamine Refill Protocol Passed - 4/10/2019  9:51 PM        Passed - Patient has had office visit/physical in last year     Last office visit with prescriber/PCP: 3/13/2019 Harriet Vo MD OR same dept: 3/13/2019 Harriet Vo MD OR same specialty: 3/13/2019 Harriet Vo MD  Last physical: Visit date not found Last MTM visit: Visit date not found   Next visit within 3 mo: Visit date not found  Next physical within 3 mo: Visit date not found  Prescriber OR PCP: Harriet Vo MD  Last diagnosis associated with med order: There are no diagnoses linked to this encounter.  If protocol passes may refill for 12 months if within 3 months of last provider visit (or a total of 15 months).

## 2021-05-27 NOTE — PATIENT INSTRUCTIONS - HE
Discussed the importance of core strengthening, ROM, stretching exercises with the patient and how each of these entities is important in decreasing pain.  Explained to the patient that the purpose of physical therapy is to teach the patient a home exercise program.  These exercises need to be performed every day in order to decrease pain and prevent future occurrences of pain.        Increase Pregabalin every other day until you are taking 150 mg two times a day    ~Please call Nurse Navigation line [phone] with any questions or concerns about your treatment plan, if symptoms worsen and you would like to be seen urgently, or if you have problems controlling bladder and bowel function.

## 2021-05-28 ASSESSMENT — ASTHMA QUESTIONNAIRES
ACT_TOTALSCORE: 19
ACT_TOTALSCORE: 18

## 2021-05-28 ASSESSMENT — ANXIETY QUESTIONNAIRES
GAD7 TOTAL SCORE: 20
GAD7 TOTAL SCORE: 21

## 2021-05-28 NOTE — TELEPHONE ENCOUNTER
RN cannot approve Refill Request    RN can NOT refill this medication med is not covered by policy/route to provider. Last office visit: 3/13/2019 Harriet Vo MD Last Physical: Visit date not found Last MTM visit: Visit date not found Last visit same specialty: 3/13/2019 Harriet Vo MD.  Next visit within 3 mo: Visit date not found  Next physical within 3 mo: Visit date not found      Priya Marshall, [facility] Triage/Med Refill 4/28/2019    Requested Prescriptions   Pending Prescriptions Disp Refills     cyclobenzaprine (FLEXERIL) 10 MG tablet [Pharmacy Med Name: CYCLOBENZAPRINE 10MG TABLETS] 30 tablet 0     Sig: TAKE 1 TABLET(10 MG) BY MOUTH TWICE DAILY AS NEEDED FOR MUSCLE SPASMS       There is no refill protocol information for this order

## 2021-05-28 NOTE — TELEPHONE ENCOUNTER
RN cannot approve Refill Request    RN can NOT refill this medication med is not covered by policy/route to provider     . Last office visit: 3/13/2019 Harriet Vo MD Last Physical: Visit date not found Last MTM visit: Visit date not found Last visit same specialty: 3/13/2019 Harriet Vo MD.  Next visit within 3 mo: Visit date not found  Next physical within 3 mo: Visit date not found      Hilda Godinez, [facility] Triage/Med Refill 4/29/2019    Requested Prescriptions   Pending Prescriptions Disp Refills     cyclobenzaprine (FLEXERIL) 10 MG tablet [Pharmacy Med Name: CYCLOBENZAPRINE 10MG TABLETS] 30 tablet 0     Sig: TAKE 1 TABLET(10 MG) BY MOUTH TWICE DAILY AS NEEDED FOR MUSCLE SPASMS       There is no refill protocol information for this order        ibuprofen (ADVIL,MOTRIN) 800 MG tablet [Pharmacy Med Name: IBUPROFEN 800MG TABLETS] 60 tablet 0     Sig: TAKE 1 TABLET(800 MG) BY MOUTH EVERY 8 HOURS WITH MEALS AS NEEDED FOR PAIN       There is no refill protocol information for this order

## 2021-05-29 ENCOUNTER — COMMUNICATION - HEALTHEAST (OUTPATIENT)
Dept: FAMILY MEDICINE | Facility: CLINIC | Age: 51
End: 2021-05-29

## 2021-05-29 DIAGNOSIS — S29.011D INTERCOSTAL MUSCLE STRAIN, SUBSEQUENT ENCOUNTER: ICD-10-CM

## 2021-05-29 NOTE — TELEPHONE ENCOUNTER
Please call pt to discuss return to work as I have received another disability form to complete.    1. What date did pt return to work?  2. Did he need restrictions when he first started back at work?  3. Is he needing restrictions at work currently? If yes, need a detailed report of what restrictions/limitations he is needing.    Thanks,  Dr Vo

## 2021-05-29 NOTE — TELEPHONE ENCOUNTER
I called pt and ask the 3 questions, pt stated he no longer has that insurance and he left his job, but was willing to answere the question because the fall happened on 02/04/2019    1. What date did pt return to work? 03/04/19    2. Did he need restrictions when he first started back at work? Yes, 5min break every 2hrs, no lifting more than 10lbs , no reaching and carrying above head to  equipment     3. Is he needing restrictions at work currently? No      If yes, need a detailed report of what restrictions/limitations he is needing.

## 2021-05-30 ENCOUNTER — COMMUNICATION - HEALTHEAST (OUTPATIENT)
Dept: FAMILY MEDICINE | Facility: CLINIC | Age: 51
End: 2021-05-30

## 2021-05-30 DIAGNOSIS — M51.26 LUMBAR DISC HERNIATION: ICD-10-CM

## 2021-05-30 DIAGNOSIS — M47.816 SPONDYLOSIS OF LUMBAR REGION WITHOUT MYELOPATHY OR RADICULOPATHY: ICD-10-CM

## 2021-05-30 DIAGNOSIS — S29.011D INTERCOSTAL MUSCLE STRAIN, SUBSEQUENT ENCOUNTER: ICD-10-CM

## 2021-05-30 DIAGNOSIS — M54.16 LUMBAR RADICULITIS: ICD-10-CM

## 2021-05-30 DIAGNOSIS — F41.9 ANXIETY: ICD-10-CM

## 2021-05-31 RX ORDER — CYCLOBENZAPRINE HCL 10 MG
TABLET ORAL
Qty: 60 TABLET | Refills: 0 | Status: SHIPPED | OUTPATIENT
Start: 2021-05-31 | End: 2021-07-21

## 2021-05-31 RX ORDER — NAPROXEN 500 MG/1
TABLET ORAL
Qty: 28 TABLET | Refills: 0 | Status: SHIPPED | OUTPATIENT
Start: 2021-05-31 | End: 2021-07-21

## 2021-06-01 RX ORDER — HYDROXYZINE HYDROCHLORIDE 25 MG/1
TABLET, FILM COATED ORAL
Qty: 60 TABLET | Refills: 3 | Status: SHIPPED | OUTPATIENT
Start: 2021-06-01 | End: 2022-04-25

## 2021-06-02 ENCOUNTER — HOSPITAL ENCOUNTER (OUTPATIENT)
Dept: PHYSICAL MEDICINE AND REHAB | Facility: CLINIC | Age: 51
Discharge: HOME | End: 2021-06-02
Attending: FAMILY MEDICINE

## 2021-06-02 ENCOUNTER — COMMUNICATION - HEALTHEAST (OUTPATIENT)
Dept: PALLIATIVE MEDICINE | Facility: OTHER | Age: 51
End: 2021-06-02

## 2021-06-02 VITALS — HEIGHT: 74 IN | BODY MASS INDEX: 40.43 KG/M2 | WEIGHT: 315 LBS

## 2021-06-02 VITALS — BODY MASS INDEX: 42.66 KG/M2 | WEIGHT: 315 LBS | HEIGHT: 72 IN

## 2021-06-02 VITALS — WEIGHT: 315 LBS | BODY MASS INDEX: 45.11 KG/M2

## 2021-06-02 VITALS — WEIGHT: 315 LBS | BODY MASS INDEX: 44.46 KG/M2

## 2021-06-02 VITALS — BODY MASS INDEX: 40.43 KG/M2 | WEIGHT: 315 LBS | HEIGHT: 74 IN

## 2021-06-02 DIAGNOSIS — G62.9 NEUROPATHY: ICD-10-CM

## 2021-06-02 DIAGNOSIS — G89.4 CHRONIC PAIN SYNDROME: ICD-10-CM

## 2021-06-02 RX ORDER — PREGABALIN 100 MG/1
100 CAPSULE ORAL DAILY
Qty: 30 CAPSULE | Refills: 0 | Status: SHIPPED | OUTPATIENT
Start: 2021-06-02 | End: 2021-07-02
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
## 2021-06-03 NOTE — PATIENT INSTRUCTIONS - HE
Ask your pharmacist about medication cost (especially the inhalers) and if something isn't covered then have them let me know so I can send an alternative

## 2021-06-04 VITALS
SYSTOLIC BLOOD PRESSURE: 160 MMHG | BODY MASS INDEX: 44.73 KG/M2 | HEART RATE: 74 BPM | DIASTOLIC BLOOD PRESSURE: 100 MMHG | WEIGHT: 315 LBS

## 2021-06-04 NOTE — PROGRESS NOTES
I met with Shaun Calvo Jr. at the request of DR Vo to recheck his blood pressure.  Blood pressure medications on the MAR were reviewed with patient.    Patient has taken all medications as per usual regimen: No  Patient reports tolerating them without any issues or concerns: Yes    There were no vitals filed for this visit.    Blood pressure was taken, previous encounter was reviewed, recorded blood pressure below 140/90.  Patient was discharged and the note will be sent to the provider for final review.    *PATIENT ALSO STATES HIS EARACHE IS NOT MUCH BETTER EVEN AFTER FINISHING THE ZPACK 3 DAYS AGO.

## 2021-06-05 VITALS — BODY MASS INDEX: 42.2 KG/M2 | HEIGHT: 74 IN

## 2021-06-05 VITALS — BODY MASS INDEX: 40.43 KG/M2 | WEIGHT: 315 LBS | HEIGHT: 74 IN

## 2021-06-05 VITALS — BODY MASS INDEX: 40.43 KG/M2 | HEIGHT: 74 IN | WEIGHT: 315 LBS

## 2021-06-05 NOTE — TELEPHONE ENCOUNTER
RN cannot approve Refill Request    RN can NOT refill this medication med is not covered by policy/route to provider. Last office visit: 11/29/2019 Harriet Vo MD Last Physical: Visit date not found Last MTM visit: Visit date not found Last visit same specialty: 11/29/2019 Harriet Vo MD.  Next visit within 3 mo: Visit date not found  Next physical within 3 mo: Visit date not found      Patria Cisneros, [facility] Triage/Med Refill 1/30/2020    Requested Prescriptions   Pending Prescriptions Disp Refills     cyclobenzaprine (FLEXERIL) 10 MG tablet [Pharmacy Med Name: Cyclobenzaprine HCl 10 MG Oral Tablet] 30 tablet 0     Sig: TAKE 1 TABLET BY MOUTH TWICE DAILY AS NEEDED FOR MUSCLE SPASM       There is no refill protocol information for this order

## 2021-06-05 NOTE — TELEPHONE ENCOUNTER
RN cannot approve Refill Request    RN can NOT refill this medication med is not covered by policy/route to provider. Last office visit: 11/29/2019 Harriet Vo MD Last Physical: Visit date not found Last MTM visit: Visit date not found Last visit same specialty: 11/29/2019 Harriet Vo MD.  Next visit within 3 mo: Visit date not found  Next physical within 3 mo: Visit date not found      Patria Cisneros, [facility] Triage/Med Refill 1/8/2020    Requested Prescriptions   Pending Prescriptions Disp Refills     cyclobenzaprine (FLEXERIL) 10 MG tablet [Pharmacy Med Name: Cyclobenzaprine HCl 10 MG Oral Tablet] 30 tablet 0     Sig: TAKE 1 TABLET BY MOUTH TWICE DAILY AS NEEDED FOR MUSCLE SPASM       There is no refill protocol information for this order

## 2021-06-05 NOTE — TELEPHONE ENCOUNTER
Refill Approved    Rx renewed per Medication Renewal Policy. Medication was last renewed on 11/29/19.    Lianne Oropeza, [facility] Triage/Med Refill 1/17/2020     Requested Prescriptions   Pending Prescriptions Disp Refills     pramipexole (MIRAPEX) 0.25 MG tablet [Pharmacy Med Name: Pramipexole Dihydrochloride 0.25 MG Oral Tablet] 60 tablet 0     Sig: TAKE 1 TABLET BY MOUTH TWICE DAILY       [facility] Meds I Refill Protocol Passed - 1/16/2020 10:40 AM        Passed - PCP or prescribing provider visit in past 6 months      Last office visit with prescriber/PCP: 11/29/2019 OR same dept: 11/29/2019 Harriet Vo MD OR same specialty: 11/29/2019 Harriet Vo MD Last physical: Visit date not found Last MTM visit: Visit date not found     Next appt within 3 mo: Visit date not found  Next physical within 3 mo: Visit date not found  Prescriber OR PCP: Harriet Vo MD  Last diagnosis associated with med order: 1. Restless legs syndrome (RLS)  - pramipexole (MIRAPEX) 0.25 MG tablet [Pharmacy Med Name: Pramipexole Dihydrochloride 0.25 MG Oral Tablet]; TAKE 1 TABLET BY MOUTH TWICE DAILY  Dispense: 60 tablet; Refill: 0    If protocol passes may refill for 6 months if within 3 months of last provider visit (or a total of 9 months).

## 2021-06-06 ENCOUNTER — COMMUNICATION - HEALTHEAST (OUTPATIENT)
Dept: FAMILY MEDICINE | Facility: CLINIC | Age: 51
End: 2021-06-06

## 2021-06-06 DIAGNOSIS — G56.03 BILATERAL CARPAL TUNNEL SYNDROME: ICD-10-CM

## 2021-06-06 NOTE — TELEPHONE ENCOUNTER
RN cannot approve Refill Request    RN can NOT refill this medication med is not covered by policy/route to provider     . Last office visit: 11/29/2019 Harriet Vo MD Last Physical: Visit date not found Last MTM visit: Visit date not found Last visit same specialty: 11/29/2019 Harriet Vo MD.  Next visit within 3 mo: Visit date not found  Next physical within 3 mo: Visit date not found      Hilda Godinez, [facility] Triage/Med Refill 3/3/2020    Requested Prescriptions   Pending Prescriptions Disp Refills     cyclobenzaprine (FLEXERIL) 10 MG tablet [Pharmacy Med Name: Cyclobenzaprine HCl 10 MG Oral Tablet] 30 tablet 0     Sig: TAKE 1 TABLET BY MOUTH TWICE DAILY AS NEEDED FOR MUSCLE SPASM       There is no refill protocol information for this order

## 2021-06-07 NOTE — TELEPHONE ENCOUNTER
RN cannot approve Refill Request    RN can NOT refill this medication med is not covered by policy/route to provider     . Last office visit: 11/29/2019 Harriet Vo MD Last Physical: Visit date not found Last MTM visit: Visit date not found Last visit same specialty: 11/29/2019 Harriet Vo MD.  Next visit within 3 mo: Visit date not found  Next physical within 3 mo: Visit date not found      Hilda Godinez, [facility] Triage/Med Refill 4/10/2020    Requested Prescriptions   Pending Prescriptions Disp Refills     cyclobenzaprine (FLEXERIL) 10 MG tablet [Pharmacy Med Name: Cyclobenzaprine HCl 10 MG Oral Tablet] 30 tablet 0     Sig: TAKE 1 TABLET BY MOUTH TWICE DAILY AS NEEDED FOR MUSCLE SPASM       There is no refill protocol information for this order

## 2021-06-07 NOTE — TELEPHONE ENCOUNTER
Left message to call back for: medication refill  Information to relay to patient:  Below message

## 2021-06-08 NOTE — TELEPHONE ENCOUNTER
RN cannot approve Refill Request    RN can NOT refill this medication med is not covered by policy/route to provider     . Last office visit: 11/29/2019 Harriet Vo MD Last Physical: Visit date not found Last MTM visit: Visit date not found Last visit same specialty: 11/29/2019 Harriet Vo MD.  Next visit within 3 mo: Visit date not found  Next physical within 3 mo: Visit date not found      Hilda Godinez, [facility] Triage/Med Refill 5/12/2020    Requested Prescriptions   Pending Prescriptions Disp Refills     cyclobenzaprine (FLEXERIL) 10 MG tablet [Pharmacy Med Name: Cyclobenzaprine HCl 10 MG Oral Tablet] 30 tablet 0     Sig: TAKE 1 TABLET BY MOUTH TWICE DAILY AS NEEDED FOR MUSCLE SPASM       There is no refill protocol information for this order

## 2021-06-08 NOTE — TELEPHONE ENCOUNTER
RN cannot approve Refill Request    RN can NOT refill this medication med is not covered by policy/route to provider. Last office visit: 11/29/2019 Harriet Vo MD Last Physical: Visit date not found Last MTM visit: Visit date not found Last visit same specialty: 11/29/2019 Harriet Vo MD.  Next visit within 3 mo: Visit date not found  Next physical within 3 mo: Visit date not found      Priya Marshall, [facility] Triage/Med Refill 6/13/2020    Requested Prescriptions   Pending Prescriptions Disp Refills     cyclobenzaprine (FLEXERIL) 10 MG tablet [Pharmacy Med Name: Cyclobenzaprine HCl 10 MG Oral Tablet] 30 tablet 0     Sig: TAKE 1 TABLET BY MOUTH TWICE DAILY AS NEEDED FOR MUSCLE SPASM       There is no refill protocol information for this order

## 2021-06-08 NOTE — TELEPHONE ENCOUNTER
RN cannot approve Refill Request    RN can NOT refill this medication med is not covered by policy/route to provider. Last office visit: 11/29/2019 Harriet Vo MD Last Physical: Visit date not found Last MTM visit: Visit date not found Last visit same specialty: 11/29/2019 Harriet Vo MD.  Next visit within 3 mo: Visit date not found  Next physical within 3 mo: Visit date not found      Patria Cisneros, [facility] Triage/Med Refill 6/14/2020    Requested Prescriptions   Pending Prescriptions Disp Refills     cyclobenzaprine (FLEXERIL) 10 MG tablet [Pharmacy Med Name: Cyclobenzaprine HCl 10 MG Oral Tablet] 30 tablet 0     Sig: TAKE 1 TABLET BY MOUTH TWICE DAILY AS NEEDED FOR MUSCLE SPASMS       There is no refill protocol information for this order

## 2021-06-11 NOTE — PROGRESS NOTES
Assessment/Plan:   Shaun is a 50 year old male here for physical with additional concerns.    1. Routine general medical examination at a health care facility  Physical completed today with additional concerns as below.  Labs as below.  Up-to-date with colonoscopy.  Up-to-date with immunizations though declines flu shot.    2. Morbid obesity (H)  BMI 42.79 today.  Discussed healthy diet and regular size for weight loss.  Patient asks if weight loss medication would be appropriate for him; we discussed that at this time with uncontrolled mood symptoms and uncontrolled pain that it would make most sense to get these controlled and then consider referral to bariatric clinic.    3. Fibromyalgia  4. Lumbar disc herniation  5. Spondylosis of lumbar region without myelopathy or radiculopathy  6. Chronic low back pain, unspecified back pain laterality, unspecified whether sciatica present  7. Lumbar radiculitis  8. Impingement syndrome of shoulder region, left  Patient with a history of chronic pain from fibromyalgia and cervical and lumbar spine issues.  In the past I recommended referral to pain clinic for management of this, unfortunately patient was not able to be seen due to pandemic and rescheduling.  New referral to pain clinic placed today, placed as urgent given patient's uncontrolled symptoms.  Refill Flexeril provided today.  Patient requested tramadol a few times but I recommend evaluation and management with pain clinic and patient understands.  - Ambulatory referral to Pain Clinic  - cyclobenzaprine (FLEXERIL) 10 MG tablet; Take 1 tablet (10 mg total) by mouth 3 (three) times a day as needed for muscle spasms.  Dispense: 60 tablet; Refill: 0    9. Anxiety  10. Severe episode of recurrent major depressive disorder, without psychotic features (H)  11. PTSD (post-traumatic stress disorder)  12. Hx of suicide attempt  Uncontrolled anxiety and depression at this time; PHQ 9 and lin 7 scores as below.  Patient  also has a history of PTSD and suicide attempt.  Patient reports no thoughts of self-harm or suicide but occasionally feels so irritable that he thinks he could potentially hurt someone else in an outburst.  Patient reports he has tried numerous medications in the past, none of which have been helpful for him.  Based on his current symptoms and reported history of medications, I recommend urgent referral to psychiatry and therapy, patient agrees with this plan.  - PHQ9 Depression Screen  - AMB REFERRAL TO MENTAL HEALTH AND ADDICTION  - Adult (18+); Psychiatry, ECT and Medication Management; Psychiatry; [facility] [phone]; We will contact you to schedule the appointment or please call with any ques...  - AMB REFERRAL TO MENTAL HEALTH AND ADDICTION  - Adult (18+); Outpatient Treatment; Individual/Couples/Family/Group Therapy/Health Psychology; [facility]    13. Alcohol use  Patient reports drinking two 1.75 L bottles of whiskey per week to help with pain; managing pain as above, will check CMP today.  - Comprehensive Metabolic Panel    14. History of anemia  15. Bleeding hemorrhoid  History of anemia and patient reporting external hemorrhoids that occasionally bleed.  Will check hemoglobin level today.  Encouraged healthy diet and regular bowel movements, okay to use Imodium as needed for loose stools, recommend increasing fiber intake.  - Hemoglobin    16. Essential hypertension  History hypertension, blood pressure uncontrolled today, will increase antihypertensive to 50 mg daily, patient will follow-up next week for nurse blood pressure check, if elevation continues then would recommend adding lisinopril 10 mg daily to regimen.  Patient is currently asymptomatic at this time therefore no urgent/emergent treatment indicated.    17. Mild intermittent asthma without complication  History mild intermittent asthma, no complications or concerns at this time, refill of  albuterol provided today.  - albuterol (PROAIR HFA;PROVENTIL HFA;VENTOLIN HFA) 90 mcg/actuation inhaler; Inhale 2 puffs every 4 (four) hours as needed.  Dispense: 8.5 g; Refill: 3    18. Diabetes mellitus screening  Given age and weight, will screen for diabetes today.  - Comprehensive Metabolic Panel    19. Lipid screening  Given age and weight, will check lipid panel today.  Patient does not take statin therapy.  - Lipid [place] FASTING       I have had an Advance Directives discussion with the patient.  The following high BMI interventions were performed this visit: encouragement to exercise and lifestyle education regarding diet    Follow up: Pending test results    Harriet Vo MD  [facility]    Subjective:     Shaun Calvo Jr. is a 50 y.o. male who presents for an annual exam.     Hx fibromyalgia, low back pain, L shoulder impingement, OA, RLS and cervical radiculpathy  -previously referred to pain clinic - never saw anyone d/t pandemic  -pt reports pain all over  -drinking more alcohol to manage pain (two 1.75L per week) - whiskey with soda  -thinks he needs another steroid injection   -reports health insurance in [facility] but do qualify for [place]  -wife not working, she has medical problems (pain, fibromyalgia, mental health issues), pt worried about finances    Hemorrhoids  -external hemorrhoids  -has been having more diarrhea recently - thinks mostly stress related - though could be alcohol or diet related  -using prep H wipes with witch hazel which are helpful  -hemorrhoids are bleeding which worries him  -feels like he is losing weight, clothes feel loose - weight is stable today    Anxiety/depression  -feels like anxiety is significant  -feels like life is out of control  -reports he has tried numerous medications, some medications caused him to feel like a zombie  -GAD7 score 21 today, extremely difficult to function  -PHQ9 score 26, scored 2 for thoughts of self harm today    HTN  -BP  elevated today  -pt reports no chest pain, shortness of breath, palpitations, leg swelling, vision changes  -taking HCTZ 25mg daily      Healthy Habits:   Healthy Diet: not great, weight stable - eating one meal per day and snacks - thinks eating habits are out of whack with anxiety - eating chinese food  Regular Exercise: no, unable to d/t chronic pain  Sunscreen Use: Yes  Dental Visits Regularly: due for visit  Seat Belt: Yes    Health Maintenance reviewed:  Lipid Profile: needs  Glucose Screen: needs  Colonoscopy: due 2022    Immunization History   Administered Date(s) Administered     Td, adult adsorbed, PF 05/10/2014     Tdap 11/29/2019     Immunization status: up to date and documented, declines flu shot, will check on shingles vaccine coverage.    Current Outpatient Medications   Medication Sig Dispense Refill     albuterol (PROAIR HFA;PROVENTIL HFA;VENTOLIN HFA) 90 mcg/actuation inhaler Inhale 2 puffs every 4 (four) hours as needed. 8.5 g 3     cyclobenzaprine (FLEXERIL) 10 MG tablet Take 1 tablet (10 mg total) by mouth 3 (three) times a day as needed for muscle spasms. 60 tablet 0     hydrOXYzine HCl (ATARAX) 25 MG tablet TAKE 1 TABLET BY MOUTH THREE TIMES DAILY AS NEEDED FOR ANXIETY(PANIC ATTACK) 60 tablet 3     omeprazole (PRILOSEC) 20 MG capsule Take 20 mg by mouth daily before breakfast.       pramipexole (MIRAPEX) 0.25 MG tablet Take 1 tablet (0.25 mg total) by mouth 2 (two) times a day. 180 tablet 1     pregabalin (LYRICA) 75 MG capsule Take 1 capsule (75 mg total) by mouth 2 (two) times a day. Increase as instructed 120 capsule 0     No current facility-administered medications for this visit.      Past Medical History:   Diagnosis Date     Anxiety      Asthma      Atrial fibrillation (H)      Depression      Hypertension      History reviewed. No pertinent surgical history.  Penicillins  Family History   Problem Relation Age of Onset     Heart disease Father      Social History     Socioeconomic  History     Marital status: Single     Spouse name: Not on file     Number of children: Not on file     Years of education: Not on file     Highest education level: Not on file   Occupational History     Not on file   Social Needs     Financial resource strain: Not on file     Food insecurity     Worry: Not on file     Inability: Not on file     Transportation needs     Medical: Not on file     Non-medical: Not on file   Tobacco Use     Smoking status: Former Smoker     Smokeless tobacco: Never Used   Substance and Sexual Activity     Alcohol use: Not on file     Drug use: Not on file     Sexual activity: Not on file   Lifestyle     Physical activity     Days per week: Not on file     Minutes per session: Not on file     Stress: Not on file   Relationships     Social connections     Talks on phone: Not on file     Gets together: Not on file     Attends Confucianist service: Not on file     Active member of club or organization: Not on file     Attends meetings of clubs or organizations: Not on file     Relationship status: Not on file     Intimate partner violence     Fear of current or ex partner: Not on file     Emotionally abused: Not on file     Physically abused: Not on file     Forced sexual activity: Not on file   Other Topics Concern     Not on file   Social History Narrative    The patient is originally from [place], [place] and went to [employer]. He did not graduate. He has worked as a  for much of his life. 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 After he was released from assisted, he looked for work in [place], [place] but could not find any and subsequently returned to [place]. He is currently living at the [facility]. He  in 2004. He has a daughter, Mayra Valdez, who is 23 and with whom he has no contact.       Review of Systems  General:  Denies problem except obesity  Eyes: Denies problem  Ears/Nose/Throat:  "Denies problem  Cardiovascular: Denies problem except hypertension  Respiratory:  Denies problem  Gastrointestinal:  Denies problem   Genitourinary: Denies problem except with external hemorrhoid  Musculoskeletal:  Denies problem except chronic back pain  Skin: Denies problem  Neurologic: Denies problem  Psychiatric: Denies problem except depression and anxiety  Endocrine: Denies problem  Heme/Lymphatic: Denies problem   Allergic/Immunologic: Denies problem    Objective:        Vitals:    09/10/20 1035 09/10/20 1037 09/10/20 1129 09/10/20 1130   BP: (!) 180/120 (!) 180/130 (!) 210/120 (!) 199/114   Pulse: 80      Weight: (!) 333 lb 5 oz (151.2 kg)      Height: 6' 2\" (1.88 m)        Body mass index is 42.79 kg/m .    Physical Exam:  General Appearance: Alert, pleasant, appears stated age, obese  Head: Normocephalic, without obvious abnormality  Eyes: PERRL, conjunctiva/corneas clear, EOM's intact  Ears: Normal TM's and external ear canals, both ears  Nose: Nares normal, septum midline,mucosa normal, no drainage  Throat: Lips, mucosa, and tongue normal; teeth and gums normal; oropharynx is clear  Neck: Supple,without lymphadenopathy, no thyromegaly or nodules noted  Lungs: Clear to auscultation bilaterally, respirations unlabored, no wheezing or crackles  Heart: Regular rate and rhythm, no murmur   Abdomen: Soft, non-tender, no masses, no organomegaly  Genitourinary: no inguinal hernias  Extremities: Extremities with strong and symmetric pulses, no cyanosis or edema  Skin: Skin color, texture normal, no rashes or lesions  Neurologic: Grossly normal, no focal deficits  Psychologic: Appears depressed, occasionally tearful during visit, affect congruent with mood, normal behaviors, answering questions appropriately, linear thought process    "

## 2021-06-11 NOTE — PATIENT INSTRUCTIONS - HE
You are due for shingles vaccine - I recommend checking with your insurance regarding where to get this done (pharmacy or in clinic - some insurance companies will only cover it if done at pharmacy)    Referrals today for:  -pain clinic  -therapy and psychiatry    Blood work today     High blood pressure  -increase hydrochlorothiazide to 50mg daily - take two 25mg tablets per day  -recheck blood pressure with nurse in clinic in 2-4 weeks

## 2021-06-11 NOTE — PROGRESS NOTES
I met with Shaun Calvo Jr. at the request of Dr Vo to recheck his blood pressure.    Blood pressure medications on the MAR were reviewed with patient.    Patient has taken all medications as per usual regimen: Yes  Patient reports tolerating them without any issues or concerns: Yes        Blood pressure was taken, previous encounter was reviewed, patient was instructed to followup next Thursday with Dr Vo.    Dizzy at work yesterday, almost fell, rested 5-10 minutes, spinning, some head pressure.   Feels better today as it is his day off.  Recommended visit today or tomorrow. Wanted to see DR Vo.  Appt made with Dr Vo next Thursday.  Advised if any symptoms worsen to go to ED or WIC.    152/86, 148/84 today. Left arm large cuff.      ADDENDUM:   BP much improved though still mildly elevated. Given sx agree with apt in clinic or WIC/ER sooner if needed.    Harriet Vo MD  [facility]

## 2021-06-11 NOTE — PATIENT INSTRUCTIONS - HE
High blood pressure:  -still too high, we need to make some changes for your health and safety  -start lisinopril 20mg daily  -decrease hydrochlorothiazide to 25mg daily  -take both pills at the same time  -recheck blood pressure in clinic with nurse next week (should be short visit)
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
## 2021-06-12 NOTE — TELEPHONE ENCOUNTER
RN cannot approve Refill Request    RN can NOT refill this medication med is not covered by policy/route to provider. Last office visit: 9/17/2020 Harriet Vo MD Last Physical: 9/10/2020 Last MTM visit: Visit date not found Last visit same specialty: 9/17/2020 Harriet Vo MD.  Next visit within 3 mo: Visit date not found  Next physical within 3 mo: Visit date not found      Patria Cisneros, [facility] Triage/Med Refill 10/22/2020    Requested Prescriptions   Pending Prescriptions Disp Refills     cyclobenzaprine (FLEXERIL) 10 MG tablet 60 tablet 0     Sig: Take 1 tablet (10 mg total) by mouth 3 (three) times a day as needed for muscle spasms.       There is no refill protocol information for this order

## 2021-06-13 NOTE — TELEPHONE ENCOUNTER
----- Message from Carola Castillo RN sent at 11/11/2020  9:55 AM CST -----    ----- Message -----  From: Harriet Vo MD  Sent: 11/11/2020   9:39 AM CST  To: Harriet Vo Care Team Pool    Please call pt to set up lab only visit in 2 weeks    Thanks  Dr Vo

## 2021-06-13 NOTE — TELEPHONE ENCOUNTER
RN cannot approve Refill Request    RN can NOT refill this medication med is not covered by policy/route to provider. Last office visit: 11/10/2020 Harriet Vo MD Last Physical: 9/10/2020 Last MTM visit: Visit date not found Last visit same specialty: 11/10/2020 Harriet Vo MD.  Next visit within 3 mo: Visit date not found  Next physical within 3 mo: Visit date not found      Patria Cisneros, [facility] Triage/Med Refill 12/17/2020    Requested Prescriptions   Pending Prescriptions Disp Refills     cyclobenzaprine (FLEXERIL) 10 MG tablet 60 tablet 0     Sig: Take 1 tablet (10 mg total) by mouth 3 (three) times a day as needed for muscle spasms.       There is no refill protocol information for this order

## 2021-06-13 NOTE — TELEPHONE ENCOUNTER
Left message to call back for: help schedule a lab apt   Information to relay to patient:  Dr Vo notes below

## 2021-06-14 NOTE — PATIENT INSTRUCTIONS - HE
No broken bones - but likely you pulled the muscles in between the ribs  -tylenol 1000mg three times a day  -naproxen 500mg two times a day with meals  -ice/heat to area  -work restrictions  -topical therapies like icy hot/biofreeze  -start printed exercises

## 2021-06-14 NOTE — PATIENT INSTRUCTIONS - HE
PLAN:  Labs in any [facility] clinic or hospital    See Dr. Lopez at the pain center for medial branch blocks, and then radiofrequency ablation.    Increase Lyrica to 100 mg twice a day, see if helpful for pain without side effects.    Begin lamotrigine to help with fibromyalgia type pain, and mood stabilization, 25 mg 1 daily for 2 weeks, 1 twice a day for 2 weeks, then 2 in the morning 1 at bedtime.    Follow-up with Dr. Nagel in 6 weeks.
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
## 2021-06-14 NOTE — PATIENT INSTRUCTIONS - HE
If scalp issue gets worse or won't go away - then let Dr Vo know and we can get you to the dermatologist
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
## 2021-06-14 NOTE — TELEPHONE ENCOUNTER
RN cannot approve Refill Request    RN can NOT refill this medication med is not covered by policy/route to provider. Last office visit: 1/20/2021 Harriet Vo MD Last Physical: 9/10/2020 Last MTM visit: Visit date not found Last visit same specialty: 1/20/2021 Harriet Vo MD.  Next visit within 3 mo: Visit date not found  Next physical within 3 mo: Visit date not found      Hilda Godinez, [facility] Triage/Med Refill 1/21/2021    Requested Prescriptions   Pending Prescriptions Disp Refills     cyclobenzaprine (FLEXERIL) 10 MG tablet [Pharmacy Med Name: CYCLOBENZAPRINE 10MG TABLETS] 60 tablet 0     Sig: TAKE 1 TABLET(10 MG) BY MOUTH THREE TIMES DAILY AS NEEDED FOR MUSCLE SPASMS       There is no refill protocol information for this order

## 2021-06-14 NOTE — PROGRESS NOTES
Shaun Calvo Jr. is a 50 y.o. male who is being evaluated via a billable video visit.      How would you like to obtain your AVS? My chart  If dropped from the video visit, the video invitation should be resent by: email  Will anyone else be joining your video visit? no    Patient is here for a new patient consult with Dr. Nagel. Patient has generalized all over body aches, low back, left hip, neck, right ankle and bilateral shoulder pain, describes the pain as constant, sharp, shooting and radiating and rates the pain as 9/10.     Hilda Ruiz LPN
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
## 2021-06-14 NOTE — TELEPHONE ENCOUNTER
Controlled Substance Refill Request  Medication Name:   Requested Prescriptions     Pending Prescriptions Disp Refills     pregabalin (LYRICA) 75 MG capsule 120 capsule 0     Sig: Take 1 capsule (75 mg total) by mouth 2 (two) times a day. Increase as instructed     Date Last Fill: 11/29/19  Requested Pharmacy: [facility]  Submit electronically to pharmacy  Controlled Substance Agreement on file:   Encounter-Level CSA Scan Date:    There are no encounter-level csa scan date.        Last office visit:  11/10/20

## 2021-06-15 ENCOUNTER — COMMUNICATION - HEALTHEAST (OUTPATIENT)
Dept: PALLIATIVE MEDICINE | Facility: OTHER | Age: 51
End: 2021-06-15

## 2021-06-15 DIAGNOSIS — M79.7 FIBROMYALGIA: ICD-10-CM

## 2021-06-15 RX ORDER — B-COMPLEX WITH VITAMIN C
1 TABLET ORAL DAILY
Qty: 30 TABLET | Refills: 5 | Status: SHIPPED | OUTPATIENT
Start: 2021-06-15 | End: 2022-10-17

## 2021-06-15 NOTE — PROGRESS NOTES
Assessment/Plan:     Problem List Items Addressed This Visit     Fibromyalgia - Primary    Relevant Medications    cholecalciferol, vitamin D3, 1,000 unit (25 mcg) tablet    omega-3 acid ethyl esters (LOVAZA) 1 gram capsule    B complex-vitamin C-folic acid 400 mcg tablet    Chronic low back pain    Relevant Medications    lamoTRIgine (LAMICTAL) 25 MG tablet    Other Relevant Orders    OPS Medial Branch Block Bilateral      Other Visit Diagnoses     Chronic pain syndrome        Relevant Medications    pregabalin (LYRICA) 100 MG capsule            No follow-ups on file.    Patient Instructions   PLAN:  Toradol injection today.    Checkout schedule with Dr. Lopez for medial branch blocks    Vitamin D supplementation 1000 unit tablets, 1 daily for 1 week, 2 daily for 1 week, 3 daily for 1 week, then 4 daily.  When due for refill we will change her higher strength.  If not covered by insurance then obtain over-the-counter and take in this fashion.    Lovasa sent to help with pain and he will the cracks in your skin.  2 capsules twice a day.  If not covered by insurance may consider omega-3 fatty acid supplements such as fish oil capsules.    Vitamin B supplementation to help with skin healing.  1 capsule daily sent as prescription.    Increase lamotrigine to 25 mg 2 tablets twice a day to help with pain and PTSD.    Increase Lyrica to 100 mg 3 times a day to help with pain.    Follow-up with Dr. Nagel in 6 weeks          Subjective:       51 y.o. male followed for lumbar degenerative changes, foraminal stenosis.    Reviews being very discouraged, spending the last 2 days working on a car that did not start in the cold.  Today his wife had to help him out of bed, he would not be able to go to work.  Has had pain in his back and knees.  Frustrated at his age he has limited level of function.    He demonstrates that his hands have lesions on his fingers, crack in the skin, reviews he gets very severe in the winter.   His hands are frequently washing gloves, he uses a variety of bandages, Neosporin.  It is a challenge.    He describes he cannot sleep with gloves on his hands at night due to his PTSD.    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  It is hard to reach down for doors.    He has been using Lyrica 100 mg.    Reviews he has had medial branch blocks and radiofrequency ablations at last 6 months, the last was in about 2016.    Reviews weight fluctuations, recalls as a teenager had to have his jaw wired and weight went from 2 50-1 95 while 6 foot 2 and felt too skinny.  Now he is around the 328, describes working around food, tends to eat just once a day is surprised he is not lose weight.    He acknowledges alcohol continues to be is something he will use for pain, states he never gets drunk.  He uses a 1.75 bottle every 4 days.    He had been seen in the emergency room, flareup with rib pain, describes frustrated they did not listen about him and focusing on chest pain.    He acknowledges fingers and toes have developed some numbness.    We reviewed laboratory showing positive for gluten negative for the antibodies, thyroid functions were normal.  The vitamin D was extremely low and we discussed replacement can be helpful for a number of his concerns.    He has been using the lamotrigine 75 mg with no particular changes yet or side effects.      Current Outpatient Medications:      albuterol (PROAIR HFA;PROVENTIL HFA;VENTOLIN HFA) 90 mcg/actuation inhaler, Inhale 2 puffs every 4 (four) hours as needed., Disp: 8.5 g, Rfl: 3     [START ON 2/18/2021] cyclobenzaprine (FLEXERIL) 10 MG tablet, TAKE 1 TABLET(10 MG) BY MOUTH THREE TIMES DAILY AS NEEDED FOR MUSCLE SPASMS, Disp: 60 tablet, Rfl: 0     hydroCHLOROthiazide (HYDRODIURIL) 25 MG tablet, Take 1 tablet (25 mg total) by mouth daily., Disp: 90 tablet, Rfl: 3     hydrOXYzine HCL (ATARAX) 25 MG tablet, TAKE 1 TABLET BY MOUTH THREE TIMES DAILY AS NEEDED FOR  "ANXIETY(PANIC ATTACK), Disp: 60 tablet, Rfl: 3     lisinopriL (PRINIVIL,ZESTRIL) 20 MG tablet, Take 1 tablet (20 mg total) by mouth daily., Disp: 30 tablet, Rfl: 11     naproxen (NAPROSYN) 500 MG tablet, Take 1 tablet (500 mg total) by mouth 2 (two) times a day with meals for 14 days., Disp: 28 tablet, Rfl: 0     omeprazole (PRILOSEC) 20 MG capsule, Take 20 mg by mouth daily before breakfast. OTC, Disp: , Rfl:      pregabalin (LYRICA) 100 MG capsule, Take 1 capsule (100 mg total) by mouth 3 (three) times a day., Disp: 60 capsule, Rfl: 2     B complex-vitamin C-folic acid 400 mcg tablet, Take 1 tablet by mouth daily., Disp: 30 tablet, Rfl: 1     cholecalciferol, vitamin D3, 1,000 unit (25 mcg) tablet, 1 tab daily 1 week, 2 tabs daily 1 weeks, 3 tab daily 1 week, then 4 daily, Disp: 120 tablet, Rfl: 2     lamoTRIgine (LAMICTAL) 25 MG tablet, Take 2 tablets (50 mg total) by mouth 2 (two) times a day., Disp: 120 tablet, Rfl: 1     omega-3 acid ethyl esters (LOVAZA) 1 gram capsule, Take 2 capsules (2 g total) by mouth 2 (two) times a day., Disp: 120 capsule, Rfl: 3     pramipexole (MIRAPEX) 0.25 MG tablet, Take 1 tablet (0.25 mg total) by mouth 2 (two) times a day. (Patient taking differently: Take 0.25 mg by mouth 2 (two) times a day. ), Disp: 180 tablet, Rfl: 1    He is alert with a clear sensorium.  Ambulates with cane.  Morbidly obese.  His hands indeed show deep crevices around many of the fingernails.    He does appear indeed quite dejected.                   Objective:     Vitals:    02/15/21 0916   BP: 107/66   Pulse: 74   Weight: (!) 328 lb (148.8 kg)   Height: 6' 2\" (1.88 m)   PainSc:   9   PainLoc: Generalized     Assessment: Lumbar degenerative changes, foraminal stenosis, with comorbid posttraumatic stress disorder, alcohol abuse.    Plan: Toradol injection today, noting in the past he would go into the emergency with flareups receiving Toradol and hydromorphone.    Discussed as plan above supplements that " may help with the skin which is a concern.    We will increase neuropathic agents.    He will get scheduled for medial branch blocks to start the process toward radiofrequency ablation.    Total time more than 30 minutes.              This note has been dictated using voice recognition software. Any grammatical or context distortions are unintentional and inherent to the software

## 2021-06-15 NOTE — TELEPHONE ENCOUNTER
Refill request for Naproxen 500 mg tablets and Cyclobenzaprine 10 mg tablets    Last refill: 02/16/21 and 02/12/21

## 2021-06-15 NOTE — TELEPHONE ENCOUNTER
[facility] PA team  [phone]  Pool: HE PA MED ([id number])          PA has been initiated.       PA form completed and faxed insurance via Cover My Meds     Peña:  AURORA ZARCO (Key: [id number])     Medication:  omega-3 acid ethyl esters (LOVAZA) 1 gram capsule     Insurance:  [place] BLUE PLUS        Response will be received via fax and may take up to 5-10 business days depending on plan

## 2021-06-15 NOTE — TELEPHONE ENCOUNTER
Refill Approved    Rx renewed per Medication Renewal Policy. Medication was last renewed on 9/10/20.    Delmer Berger, [facility] Triage/Med Refill 3/16/2021     Requested Prescriptions   Pending Prescriptions Disp Refills     albuterol (PROAIR HFA;PROVENTIL HFA;VENTOLIN HFA) 90 mcg/actuation inhaler 8.5 g 3     Sig: Inhale 2 puffs every 4 (four) hours as needed.       Albuterol/Levalbuterol Refill Protocol Passed - 3/15/2021  1:53 PM        Passed - PCP or prescribing provider visit in last year     Last office visit with prescriber/PCP: 2/2/2021 Harriet Vo MD OR same dept: 2/2/2021 Harriet Vo MD OR same specialty: 2/2/2021 Harriet Vo MD Last physical: 9/10/2020       Next appt within 3 mo: Visit date not found  Next physical within 3 mo: Visit date not found  Prescriber OR PCP: Harriet Vo MD  Last diagnosis associated with med order: 1. Mild intermittent asthma without complication  - albuterol (PROAIR HFA;PROVENTIL HFA;VENTOLIN HFA) 90 mcg/actuation inhaler; Inhale 2 puffs every 4 (four) hours as needed.  Dispense: 8.5 g; Refill: 3    If protocol passes may refill for 6 months if within 3 months of last provider visit (or a total of 9 months). If patient requesting >1 inhaler per month refill x 6 months and have patient make appointment with provider.

## 2021-06-15 NOTE — TELEPHONE ENCOUNTER
RN cannot approve Refill Request    RN can NOT refill this medication med is not covered by policy/route to provider. Last office visit: 2/2/2021 Harriet Vo MD Last Physical: 9/10/2020 Last MTM visit: Visit date not found Last visit same specialty: 2/2/2021 Harriet Vo MD.  Next visit within 3 mo: Visit date not found  Next physical within 3 mo: Visit date not found      Cindy Luu, [facility] Triage/Med Refill 3/1/2021    Requested Prescriptions   Pending Prescriptions Disp Refills     naproxen (NAPROSYN) 500 MG tablet 28 tablet 0     Sig: Take 1 tablet (500 mg total) by mouth 2 (two) times a day with meals for 14 days.       There is no refill protocol information for this order        cyclobenzaprine (FLEXERIL) 10 MG tablet 60 tablet 0     Sig: TAKE 1 TABLET(10 MG) BY MOUTH THREE TIMES DAILY AS NEEDED FOR MUSCLE SPASMS       There is no refill protocol information for this order

## 2021-06-15 NOTE — TELEPHONE ENCOUNTER
Prior Authorization Request  Who s requesting:  Pharmacy  Pharmacy Name and Location: [facility]  Medication Name: omega-3 acid ethyl esters (LOVAZA) 1 gram capsule   Insurance Plan: Blue Plus  Insurance Member ID Number: [id number]  CoverMyMeds Key: N/A  Informed patient that prior authorizations can take up to 10 business days for response:   Yes  Okay to leave a detailed message: Yes

## 2021-06-15 NOTE — PROGRESS NOTES
Shaun RAYMUNDO Umesh Cottrell is 51 y.o. and presents today for the following health issues for generalized all over body aches, low back, bilateral hip, neck, bilateral ankle, bilateral knees and bilateral shoulders     Pain score: 9  Constant or intermittent: constant  What does your pain feel like: throbbing, dull ache, shooting, radiating, numbness and tingling  Does the pain interfere with:   Work: yes  Walking/distance: yes  Sleep: yes  Daily activities: yes  Relationships/social life: yes  Mood: yes  F= 8  No refills are needed.    Hilda Ruiz LPN

## 2021-06-15 NOTE — PATIENT INSTRUCTIONS - HE
PLAN:  Toradol injection today.    Checkout schedule with Dr. Lopez for medial branch blocks    Vitamin D supplementation 1000 unit tablets, 1 daily for 1 week, 2 daily for 1 week, 3 daily for 1 week, then 4 daily.  When due for refill we will change her higher strength.  If not covered by insurance then obtain over-the-counter and take in this fashion.    Lovasa sent to help with pain and he will the cracks in your skin.  2 capsules twice a day.  If not covered by insurance may consider omega-3 fatty acid supplements such as fish oil capsules.    Vitamin B supplementation to help with skin healing.  1 capsule daily sent as prescription.    Increase lamotrigine to 25 mg 2 tablets twice a day to help with pain and PTSD.    Increase Lyrica to 100 mg 3 times a day to help with pain.    Follow-up with Dr. Nagel in 6 weeks

## 2021-06-15 NOTE — TELEPHONE ENCOUNTER
RN cannot approve Refill Request    RN can NOT refill this medication med is not covered by policy/route to provider. Last office visit: 2/2/2021 Harriet Vo MD Last Physical: 9/10/2020 Last MTM visit: Visit date not found Last visit same specialty: 2/2/2021 Harriet oV MD.  Next visit within 3 mo: Visit date not found  Next physical within 3 mo: Visit date not found      Cindy Luu, [facility] Triage/Med Refill 2/16/2021    Requested Prescriptions   Pending Prescriptions Disp Refills     naproxen (NAPROSYN) 500 MG tablet 28 tablet 0     Sig: Take 1 tablet (500 mg total) by mouth 2 (two) times a day with meals for 14 days.       There is no refill protocol information for this order

## 2021-06-16 ENCOUNTER — HOSPITAL ENCOUNTER (EMERGENCY)
Dept: EMERGENCY MEDICINE | Facility: HOSPITAL | Age: 51
Discharge: HOME | End: 2021-06-16
Attending: EMERGENCY MEDICINE
Payer: OTHER MISCELLANEOUS

## 2021-06-16 ENCOUNTER — COMMUNICATION - HEALTHEAST (OUTPATIENT)
Dept: FAMILY MEDICINE | Facility: CLINIC | Age: 51
End: 2021-06-16

## 2021-06-16 DIAGNOSIS — S29.011D INTERCOSTAL MUSCLE STRAIN, SUBSEQUENT ENCOUNTER: ICD-10-CM

## 2021-06-16 DIAGNOSIS — M25.562 ACUTE PAIN OF LEFT KNEE: ICD-10-CM

## 2021-06-16 PROBLEM — F41.9 ANXIETY: Status: ACTIVE | Noted: 2019-03-01

## 2021-06-16 PROBLEM — F43.10 PTSD (POST-TRAUMATIC STRESS DISORDER): Status: ACTIVE | Noted: 2019-03-01

## 2021-06-16 PROBLEM — G89.29 OTHER CHRONIC PAIN: Status: ACTIVE | Noted: 2019-03-01

## 2021-06-16 PROBLEM — E66.01 MORBID OBESITY (H): Status: ACTIVE | Noted: 2019-12-04

## 2021-06-16 PROBLEM — G25.81 RESTLESS LEGS SYNDROME (RLS): Status: ACTIVE | Noted: 2019-03-01

## 2021-06-16 PROBLEM — F33.2 SEVERE EPISODE OF RECURRENT MAJOR DEPRESSIVE DISORDER, WITHOUT PSYCHOTIC FEATURES (H): Status: ACTIVE | Noted: 2021-05-19

## 2021-06-16 PROBLEM — M19.90 OSTEOARTHRITIS: Status: ACTIVE | Noted: 2019-03-01

## 2021-06-16 ASSESSMENT — MIFFLIN-ST. JEOR: SCORE: 2312.29

## 2021-06-16 NOTE — PROGRESS NOTES
Assessment/Plan:     Problem List Items Addressed This Visit     Chronic low back pain - Primary    Relevant Medications    ketorolac (TORADOL) 30 mg/mL injection      Other Visit Diagnoses     Chronic pain syndrome                  No follow-ups on file.    Patient Instructions   PLAN:     Pain clinic to continue to work to obtain medial branch blocks.    Toradol injection today, and may have 1 injection every 8 hours needed for pain, no more than 8 injections every 2 weeks.    Change Lyrica back to 100 mg at bedtime.    Continue lamotrigine to help with pain, anxiety, 25 mg tablets 2 in the morning and 1 at bedtime.    Call if continues to be problems with sedation.    May continue with the vitamin D supplementation and omega-3 fatty acid supplementation to help with your hands.    Follow-up with Dr. Nagel in 6 weeks.        Subjective:       51 y.o. male seen for low back pain, history of foraminal stenosis with response to medial branch blocks and radiofrequency.    His wife had called as she noted mental status changes, initially attributed lamotrigine stop this, then is continued more likely weighted to the Lyrica which had been increased to 100 mg 3 times a day.    He reviews today struggling with memory lately, today forgot to put in his teeth before he came in the hospital.  Not coping well.  Describes having thoughts of suicide this morning though talk to his wife and resolved.  Quite adamant that he will be safe and does not need to be in the hospital.  Continues working.    He did not get the medial branch block.  We will need to look into prior authorization issues.    He has been taking the vitamin D replacement as we found his level was quite low at 7.4, and there were problems with fish oil.    He was resume the Lyrica 100 mg daily.    When last seen found the Toradol injection helpful for several days.    Reports he has cut down his alcohol a lot, now maybe 2 drinks each night.        Current  "Outpatient Medications:      albuterol (PROAIR HFA;PROVENTIL HFA;VENTOLIN HFA) 90 mcg/actuation inhaler, Inhale 2 puffs every 4 (four) hours as needed., Disp: 8.5 g, Rfl: 5     B complex-vitamin C-folic acid 400 mcg tablet, Take 1 tablet by mouth daily., Disp: 30 tablet, Rfl: 1     cholecalciferol, vitamin D3, 1,000 unit (25 mcg) tablet, 1 tab daily 1 week, 2 tabs daily 1 weeks, 3 tab daily 1 week, then 4 daily, Disp: 120 tablet, Rfl: 2     cyclobenzaprine (FLEXERIL) 10 MG tablet, TAKE 1 TABLET(10 MG) BY MOUTH THREE TIMES DAILY AS NEEDED FOR MUSCLE SPASMS, Disp: 60 tablet, Rfl: 0     hydroCHLOROthiazide (HYDRODIURIL) 25 MG tablet, Take 1 tablet (25 mg total) by mouth daily., Disp: 90 tablet, Rfl: 3     hydrOXYzine HCL (ATARAX) 25 MG tablet, TAKE 1 TABLET BY MOUTH THREE TIMES DAILY AS NEEDED FOR ANXIETY(PANIC ATTACK), Disp: 60 tablet, Rfl: 3     lisinopriL (PRINIVIL,ZESTRIL) 20 MG tablet, Take 1 tablet (20 mg total) by mouth daily., Disp: 30 tablet, Rfl: 11     omeprazole (PRILOSEC) 20 MG capsule, Take 20 mg by mouth daily before breakfast. OTC, Disp: , Rfl:      pramipexole (MIRAPEX) 0.25 MG tablet, Take 1 tablet (0.25 mg total) by mouth 2 (two) times a day., Disp: 180 tablet, Rfl: 1     ketorolac (TORADOL) 30 mg/mL injection, One injection every 8 hours as needed, no more than eight injections in 14 days, Disp: 8 mL, Rfl: 0     omega-3 acid ethyl esters (LOVAZA) 1 gram capsule, Take 2 capsules (2 g total) by mouth 2 (two) times a day., Disp: 120 capsule, Rfl: 3     pregabalin (LYRICA) 100 MG capsule, Take 1 capsule (100 mg total) by mouth daily., Disp: 30 capsule, Rfl: 0    He is alert with a clear sensorium good eye contact.  Thought process logical.  Affect is fairly full range.       Objective:     Vitals:    03/26/21 1327   BP: (!) 157/108   Pulse: 68   Weight: (!) 325 lb (147.4 kg)   Height: 6' 2\" (1.88 m)   PainSc:   8   PainLoc: Generalized         Assessment, low back pain with history of degenerative " changes, positive response anesthetic interventions.    Plan: Will continue to look into the scheduling process.    Will have a Toradol injection today and given a prescription to have injections, has a niece who is a medical professional look into injections and lives nearby.  Discussed no more than 8 injections in 2weeks.    Discussed the need to continue to decrease his alcohol, as he reports today some nerve pain in his right thumb.    We will resume the lamotrigine 75 mg daily which was none is likely because of the cognitive changes and may help with some neuropathic pain.    He will call if there are changes.    Total time more than 25 minutes.          This note has been dictated using voice recognition software. Any grammatical or context distortions are unintentional and inherent to the software

## 2021-06-16 NOTE — TELEPHONE ENCOUNTER
Medication being requested: Ketoralac  Last visit date:  3/26  Provider: BE  Next visit date: needs appt..  Provider: BE  Expected follow up: 6 weeks  MTM visit ([facility]) date: na  UDT date: na  Agreement date: na   (Last fill date; name; strength; provider; MME; quantity):  NA  Pertinent between visit information about requested medication (telephone, mychart, prior authorization, concerns, comments): na  Script being sent to provider by nurse- dates and quantity:   Requested Prescriptions     Pending Prescriptions Disp Refills     ketorolac (TORADOL) 30 mg/mL injection 8 mL 0     Sig: One injection every 8 hours as needed, no more than eight injections in 14 days     Pharmacy cued: [facility]  Standing orders for withdrawal protocol implemented: na

## 2021-06-16 NOTE — TELEPHONE ENCOUNTER
RN cannot approve Refill Request    RN can NOT refill this medication med is not covered by policy/route to provider. Last office visit: Visit date not found Last Physical: Visit date not found Last MTM visit: Visit date not found Last visit same specialty: 2/2/2021 Harriet Vo MD.  Next visit within 3 mo: Visit date not found  Next physical within 3 mo: Visit date not found      Priya Marshall, [facility] Triage/Med Refill 4/18/2021    Requested Prescriptions   Pending Prescriptions Disp Refills     cyclobenzaprine (FLEXERIL) 10 MG tablet [Pharmacy Med Name: CYCLOBENZAPRINE 10MG TABLETS] 60 tablet 0     Sig: TAKE 1 TABLET(10 MG) BY MOUTH THREE TIMES DAILY AS NEEDED FOR MUSCLE SPASMS       There is no refill protocol information for this order

## 2021-06-16 NOTE — TELEPHONE ENCOUNTER
Refill request  b complex vitamins    Last visit:  3/26    Next visit:  Needs scheduling, 6 week follow up      Requested Prescriptions     Pending Prescriptions Disp Refills     B complex-vitamin C-folic acid 400 mcg tablet 30 tablet 1     Sig: Take 1 tablet by mouth daily.

## 2021-06-16 NOTE — TELEPHONE ENCOUNTER
Jagruti calls, voicemail regarding patient and that she is in need of a phone call from the provider and that she does not know why the patient is not following up with Dr. Nagel.  Call back to Jagruti.  She reviews that back on March 6th the patient was very sleepy and slept x 2 days, he forgot to take his medication during that time and has since stopped lamictal as it seemed this was the cause.  He was and still is currently experiencing: grogginess, slow speech, not able to put his thoughts together effectively, forgetfullness.  When the lamictal was stopped at the beginning of March patient's wife thought these symptoms to be from lamictal.  At this time she is questioning the lyrica 100 mg three times a day as the symptoms are continuing.  She reports that from her recollection and the patient's recollection when taking lyrica 100 mg daily he did not experience the above reported side effects.    Necessary follow up:  Advised the patient that this message would be sent to scheduling to assist with getting patient in for follow up for as soon as possible. (Reg/Sched)    Advised that this message would be reviewed to find out the current status of insurance authorization for a procedure. (PA team)    Advised that this message would be routed to the provider to review and make recommendations as appropriate and either the provider would contact the patient or RN would call back. (Provider,RN)    She requests that for an apt please call the home #  If calling with further recommendations call the mobile #

## 2021-06-16 NOTE — PATIENT INSTRUCTIONS - HE
PLAN:     Pain clinic to continue to work to obtain medial branch blocks.    Toradol injection today, and may have 1 injection every 8 hours needed for pain, no more than 8 injections every 2 weeks.    Change Lyrica back to 100 mg at bedtime.    Continue lamotrigine to help with pain, anxiety, 25 mg tablets 2 in the morning and 1 at bedtime.    Call if continues to be problems with sedation.    May continue with the vitamin D supplementation and omega-3 fatty acid supplementation to help with your hands.    Follow-up with Dr. Nagel in 6 weeks.

## 2021-06-16 NOTE — TELEPHONE ENCOUNTER
RN cannot approve Refill Request    RN can NOT refill this medication med is not covered by policy/route to provider. Last office visit: Visit date not found Last Physical: Visit date not found Last MTM visit: Visit date not found Last visit same specialty: 2/2/2021 Harriet Vo MD.  Next visit within 3 mo: Visit date not found  Next physical within 3 mo: Visit date not found      Cindy TYLER Luu, [facility] Triage/Med Refill 3/25/2021    Requested Prescriptions   Pending Prescriptions Disp Refills     cyclobenzaprine (FLEXERIL) 10 MG tablet [Pharmacy Med Name: CYCLOBENZAPRINE 10MG TABLETS] 60 tablet 0     Sig: TAKE 1 TABLET(10 MG) BY MOUTH THREE TIMES DAILY AS NEEDED FOR MUSCLE SPASMS       There is no refill protocol information for this order        naproxen (NAPROSYN) 500 MG tablet [Pharmacy Med Name: NAPROXEN 500MG TABLETS] 28 tablet 0     Sig: TAKE 1 TABLET(500 MG) BY MOUTH TWICE DAILY WITH MEALS FOR 14 DAYS       There is no refill protocol information for this order

## 2021-06-16 NOTE — TELEPHONE ENCOUNTER
Now busy, please contact pt, should return lyrica to 100 mg daily and assess   Per Dr Nagel      Returned call and spoke with Jaime, informed him that Dr Nagel would like him to decrease his Lyrica to 100 mg one tablet per day. Patient sounded groggy but stated that he understood these new directions.    Requested Prescriptions     Pending Prescriptions Disp Refills     pregabalin (LYRICA) 100 MG capsule 30 capsule 0     Sig: Take 1 capsule (100 mg total) by mouth daily.

## 2021-06-16 NOTE — TELEPHONE ENCOUNTER
RN cannot approve Refill Request    RN can NOT refill this medication med is not covered by policy/route to provider. Last office visit: Visit date not found Last Physical: Visit date not found Last MTM visit: Visit date not found Last visit same specialty: 2/2/2021 Harriet Vo MD.  Next visit within 3 mo: Visit date not found  Next physical within 3 mo: Visit date not found      Cindy Luu, [facility] Triage/Med Refill 4/12/2021    Requested Prescriptions   Pending Prescriptions Disp Refills     naproxen (NAPROSYN) 500 MG tablet [Pharmacy Med Name: NAPROXEN 500MG TABLETS] 28 tablet 0     Sig: TAKE 1 TABLET(500 MG) BY MOUTH TWICE DAILY WITH MEALS FOR 14 DAYS       There is no refill protocol information for this order

## 2021-06-16 NOTE — PROGRESS NOTES
Shaun BREANNE Calvo Jr. is 51 y.o. and presents today for the following health issues   Pain score: 8  Constant or intermittent: constant  What does your pain feel like: searing, radiating, burning and numbness  Does the pain interfere with:   Work: yes  Walking/distance: yes  Sleep: yes  Daily activities: yes  Relationships/social life: yes  Mood: yes  F= 8    Hilda Ruiz LPN

## 2021-06-17 NOTE — PROGRESS NOTES
"Assessment/Plan:     Problem List Items Addressed This Visit     Chronic low back pain - Primary    Relevant Medications    ketorolac (TORADOL) 30 mg/mL (1 mL) injection    Other Relevant Orders    Comprehensive Metabolic Panel    OPS MBB Lumbar Two Levels Bilateral              No follow-ups on file.    Patient Instructions   PLAN:  Be enrolled in the [place] medical cannabis program.    We will obtain laboratories for kidney and liver given that you have been using the Toradol frequently.    You will decrease the Naprosyn use.    Trial of melatonin 10 mg at night to help sleep.    Discussed the herbal anti-inflammatory to relieve which you may obtain at the [facility] pharmacy to take the place of Naprosyn.    Will look into the order for a medial branch block to see if insurance is covering.    Continue with the Lyrica 100 mg in the morning.    Follow-up with Dr. Nagel of 8 to 12 weeks.        Subjective:       51 y.o. male The patient has been notified of the following:      \"We have found that certain health care needs can be provided without the need for a face to face visit.  This service lets us provide the care you need with a phone conversation.       I will have full access to your [facility] medical record during this entire phone call.   I will be taking notes for your medical record.      Since this is like an office visit, we will bill your insurance company for this service.       There are potential benefits and risks of telephone visits (e.g. limits to patient confidentiality) that differ from in-person visits.?  Confidentiality still applies for telephone services, and nobody will record the visit.  It is important to be in a quiet, private space that is free of distractions (including cell phone or other devices) during the visit.??      If during the course of the call I believe a telephone visit is not appropriate, you will not be charged for this service\"     Consent has been " obtained for this service by care team member: Yes      Patient follow-up for low back pain, history of foraminal stenosis.    Today he reports he is doing better by having the Toradol injection roughly every other day.  This is allowed him to manage his work shifts, 6-hour days, 5 days a week.  By the end of the day he can be in pain.  He describes driving home as a challenge and when he gets out of his car walks like an old man.  He notes no side effects from the Toradol aside from perhaps feeling constipated for a day.    Reviewing the record he did have a creatinine 1.41 in early February.  Her son is on his medication list, states he does not regularly take that.    We had discussed adjustment of lamotrigine and Lyrica last time.  He reports now his memory is good and back to normal.  He is off the lamotrigine and taking Lyrica 100 mg in the morning.    He is taking vitamin D replacement.    He reports alcohol is down to 2 drinks or less a night in part related to finances.    He has decreased his use of marijuana, again related to finances.  He was interested in the medical cannabis program which we have discussed last time any expected be enrolled.    His hands continue to be a problem as he washes them frequently.  He is taking the fish oil.    Notes insomnia is sometimes an issue.  Uses significant stresses mostly related around housing and other complicated issues.  Has not taken melatonin.    Is interested in getting medial branch block as they have been helpful in the past.  This was ordered.  Unclear if there was an insurance issue.      Current Outpatient Medications:      albuterol (PROAIR HFA;PROVENTIL HFA;VENTOLIN HFA) 90 mcg/actuation inhaler, Inhale 2 puffs every 4 (four) hours as needed., Disp: 8.5 g, Rfl: 5     B complex-vitamin C-folic acid 400 mcg tablet, Take 1 tablet by mouth daily., Disp: 30 tablet, Rfl: 1     cholecalciferol, vitamin D3, 1,000 unit (25 mcg) tablet, 1 tab daily 1 week, 2  "tabs daily 1 weeks, 3 tab daily 1 week, then 4 daily (Patient taking differently: Take 2,000 Units by mouth 2 (two) times a day. ), Disp: 120 tablet, Rfl: 2     cyclobenzaprine (FLEXERIL) 10 MG tablet, TAKE 1 TABLET(10 MG) BY MOUTH THREE TIMES DAILY AS NEEDED FOR MUSCLE SPASMS, Disp: 60 tablet, Rfl: 0     fish oil-omega-3 fatty acids (FISH OIL) 300-1,000 mg capsule, Take 2 g by mouth daily. OTC, Disp: , Rfl:      hydroCHLOROthiazide (HYDRODIURIL) 25 MG tablet, Take 1 tablet (25 mg total) by mouth daily., Disp: 90 tablet, Rfl: 3     lisinopriL (PRINIVIL,ZESTRIL) 20 MG tablet, Take 1 tablet (20 mg total) by mouth daily., Disp: 30 tablet, Rfl: 11     naproxen (NAPROSYN) 500 MG tablet, TAKE 1 TABLET(500 MG) BY MOUTH TWICE DAILY WITH MEALS FOR 14 DAYS, Disp: 28 tablet, Rfl: 0     omeprazole (PRILOSEC) 20 MG capsule, Take 20 mg by mouth daily before breakfast. OTC, Disp: , Rfl:      pramipexole (MIRAPEX) 0.25 MG tablet, Take 1 tablet (0.25 mg total) by mouth 2 (two) times a day., Disp: 180 tablet, Rfl: 1     hydrOXYzine HCL (ATARAX) 25 MG tablet, TAKE 1 TABLET BY MOUTH THREE TIMES DAILY AS NEEDED FOR ANXIETY(PANIC ATTACK), Disp: 60 tablet, Rfl: 3     ketorolac (TORADOL) 30 mg/mL (1 mL) injection, , Disp: , Rfl:      ketorolac (TORADOL) 30 mg/mL (1 mL) injection, Inject 1 mL (30 mg total) into the shoulder, thigh, or buttocks 3 (three) times a day as needed. No more than eight doses every two weeks, Disp: 8 mL, Rfl: 2     melatonin 10 mg cap, Take 1 tablet by mouth at bedtime., Disp: 30 capsule, Rfl: 2             Objective:     Vitals:    05/18/21 0941   Height: 6' 2\" (1.88 m)   PainSc:   9       Time spent more than 25 minutes          This note has been dictated using voice recognition software. Any grammatical or context distortions are unintentional and inherent to the software  "

## 2021-06-17 NOTE — PROGRESS NOTES
Clinic Care Coordination Contact  Community Health Worker Initial Outreach    CHW Initial Information Gathering:  Referral Source: PCP  Preferred Hospital: [facility]  [phone]  Preferred Urgent Care: [facility], [phone]  Current living arrangement:: I live in a private home with spouse  Type of residence:: Apartment  Community Resources: None  Supplies Currently Used at Home: None  Transportation means:: Regular car  CHW Additional Questions  MyChart active?: Yes  Patient sent Social Determinants of Health questionnaire?: No    Patient accepts CC: Yes. Patient scheduled for assessment with CCC FERNANDO on 5/24/21 at 830 am. Patient noted desire to discuss housing issues and financial/legal assistance. .

## 2021-06-17 NOTE — TELEPHONE ENCOUNTER
RN cannot approve Refill Request    RN can NOT refill this medication med is not covered by policy/route to provider. Last office visit: 2/2/2021 Harriet Vo MD Last Physical: 9/10/2020 Last MTM visit: Visit date not found Last visit same specialty: 2/2/2021 Harriet Vo MD.  Next visit within 3 mo: Visit date not found  Next physical within 3 mo: Visit date not found      Cindy Luu, [facility] Triage/Med Refill 4/26/2021    Requested Prescriptions   Pending Prescriptions Disp Refills     naproxen (NAPROSYN) 500 MG tablet 28 tablet 0     Sig: TAKE 1 TABLET(500 MG) BY MOUTH TWICE DAILY WITH MEALS FOR 14 DAYS       There is no refill protocol information for this order

## 2021-06-17 NOTE — PROGRESS NOTES
Clinic Care Coordination Contact  [facility]/Voicemail       Clinical Data: Care Coordinator Outreach  Outreach attempted x 1.  Left message on patient's voicemail with call back information and requested return call.  Plan:  Care Coordinator will try to reach patient again in 1-2 business days.

## 2021-06-17 NOTE — PATIENT INSTRUCTIONS - HE
Patient Instructions by Harriet Vo MD at 2/5/2019  1:20 PM     Author: Harriet Vo MD Service: -- Author Type: Physician    Filed: 2/5/2019  1:36 PM Encounter Date: 2/5/2019 Status: Addendum    : Harriet Vo MD (Physician)    Related Notes: Original Note by Harriet Vo MD (Physician) filed at 2/5/2019  1:30 PM       Patient Education     Rib Fracture    You broke one or more ribs. This is called a rib fracture. Rib fractures do not require a cast like other bones. They will heal by themselves in about 4 to 6 weeks. The first 3 to 4 weeks will be the most painful because deep breathing, coughing, or changing position from sitting to lying down, may cause the broken ends to move slightly.  Home care    Rest. You should not be doing any heavy lifting or strenuous exertion until the pain goes away.    It hurts to breathe when you have a broken rib. This puts you at risk of getting pneumonia from poor airflow through your lungs. To prevent this:  ? Take several very deep breaths once an hour while you're awake. Exhale through pursed lips as if you are blowing up a balloon. If possible, actually blow up a balloon or a rubber glove. This exercise builds up pressure inside the lung and prevents collapse of the small air sacs of the lung. This exercise may cause some pain at the site of injury, which is normal.  ? You may have gotten a breathing exercise device called an incentive spirometer. Use it at least 4 times a day, or as directed.    Apply an ice pack over the injured area for 15 to 20 minutes every 1 to 2 hours. You should do this for the first 24 to 48 hours. You can make an ice pack by filling a plastic bag that seals at the top with ice cubes and then wrapping it with a thin towel. Continue with ice packs as needed for the relief of pain and swelling.    You may use over-the-counter pain medicine to control pain, unless another pain medicine was prescribed. If you have chronic liver  or kidney disease or ever had a stomach ulcer or GI bleeding, talk with your healthcare provider before using these medicines.    If your pain is not controlled, contact your healthcare provider. Sometimes a stronger pain medicine may be needed. A nerve block can be done in case of severe pain. It will numb the nerve between the ribs.  Follow-up care  Follow up with your healthcare provider, or as advised. Rarely, a broken rib will cause complications within the first few days that may not be evident during your initial exam. This can include collapsed lung, bleeding around the lung or into the abdomen, or pneumonia. Therefore, watch for the signs below.  If X-rays were taken, you will be told of any new findings that may affect your care.  Call 911  Call 911 if you have:    Dizziness, weakness or fainting    Shortness of breath with or without chest discomfort    New or worsening abdominal pain    Discomfort in other areas of your upper body such as your shoulders, jaw, neck, or arms  When to seek medical advice  Call your healthcare provider right away if any of these occur:    Increasing chest pain with breathing    Fever of 100.4 F (38 C) or higher, or as directed by your healthcare provider    Congested cough       follow up 4-6 weeks as needed    Ice/heat to area  Continue ibuprofen three times daily

## 2021-06-17 NOTE — TELEPHONE ENCOUNTER
RN cannot approve Refill Request    RN can NOT refill this medication med is not covered by policy/route to provider. Last office visit: Visit date not found Last Physical: Visit date not found Last MTM visit: Visit date not found Last visit same specialty: 2/2/2021 Harriet Vo MD.  Next visit within 3 mo: Visit date not found  Next physical within 3 mo: Visit date not found      Cindy Luu, [facility] Triage/Med Refill 5/10/2021    Requested Prescriptions   Pending Prescriptions Disp Refills     cyclobenzaprine (FLEXERIL) 10 MG tablet [Pharmacy Med Name: CYCLOBENZAPRINE 10MG TABLETS] 60 tablet 0     Sig: TAKE 1 TABLET(10 MG) BY MOUTH THREE TIMES DAILY AS NEEDED FOR MUSCLE SPASMS       There is no refill protocol information for this order

## 2021-06-17 NOTE — TELEPHONE ENCOUNTER
Patient is coming in today to see dr lofton for numbness in finger and will talk to her about the support dog.

## 2021-06-17 NOTE — TELEPHONE ENCOUNTER
Telephone Encounter by Saray Pacheco at 2/16/2021  2:59 PM     Author: Saray Pacheco Service: -- Author Type: --    Filed: 2/16/2021  3:01 PM Encounter Date: 2/15/2021 Status: Signed    : Saray Pacheco       PRIOR AUTHORIZATION DENIED    Denial Rational: Must have a FDA approved diagnosis for medication. If patient does have an FDA approved diagnosis step therapy is also required: must try/fail at least two preferred drugs listed below          Appeal Information: This medication was denied. If physician would like to appeal because patient has contraindication or allergy to covered medication please write letter of medical necessity and route back to PA team to initiate.  If no further action is needed please close encounter thank you.

## 2021-06-17 NOTE — PROGRESS NOTES
Shaun Calvo Jr. is a 51 y.o. male who is being evaluated via a billable video visit.      How would you like to obtain your AVS? MyChart.  If dropped from the video visit, the video invitation should be resent by: Send to e-mail at: [email or web address]  Will anyone else be joining your video visit? No     Patient has a follow up appointment with Dr. Nagel.   Pain Score: 9  Is your pain constant and/or intermittent: constant  How do you describe your pain: dull, sharp, throbbing, burning and aching  Does the pain interfere with:   Work: yes  Walking/distance: yes  Sleep: yes  Daily activities: yes  Relationships/social life: yes  Mood: yes  F: 8  Completed online Medical Cannabis program, patient is aware of this.     Hilda Ruiz LPN
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
## 2021-06-17 NOTE — PATIENT INSTRUCTIONS - HE
PLAN:  Be enrolled in the [place] medical cannabis program.    We will obtain laboratories for kidney and liver given that you have been using the Toradol frequently.    You will decrease the Naprosyn use.    Trial of melatonin 10 mg at night to help sleep.    Discussed the herbal anti-inflammatory to relieve which you may obtain at the [facility] pharmacy to take the place of Naprosyn.    Will look into the order for a medial branch block to see if insurance is covering.    Continue with the Lyrica 100 mg in the morning.    Eletone and 10 mg at bedtime to help with sleep.  We will send as a prescription if not covered may also get at the [facility] pharmacy      Follow-up with Dr. Nagel of 8 to 12 weeks.

## 2021-06-17 NOTE — TELEPHONE ENCOUNTER
RN cannot approve Refill Request    RN can NOT refill this medication med is not covered by policy/route to provider. Last office visit: 2/2/2021 Harriet Vo MD Last Physical: 9/10/2020 Last MTM visit: Visit date not found Last visit same specialty: 2/2/2021 Harriet Vo MD.  Next visit within 3 mo: Visit date not found  Next physical within 3 mo: Visit date not found      Cindy Luu, [facility] Triage/Med Refill 5/10/2021    Requested Prescriptions   Pending Prescriptions Disp Refills     naproxen (NAPROSYN) 500 MG tablet 28 tablet 0     Sig: TAKE 1 TABLET(500 MG) BY MOUTH TWICE DAILY WITH MEALS FOR 14 DAYS       There is no refill protocol information for this order

## 2021-06-18 ENCOUNTER — COMMUNICATION - HEALTHEAST (OUTPATIENT)
Dept: CARE COORDINATION | Facility: CLINIC | Age: 51
End: 2021-06-18

## 2021-06-18 NOTE — LETTER
Letter by Harriet Vo MD at      Author: Harriet Vo MD Service: -- Author Type: --    Filed:  Encounter Date: 2/18/2019 Status: (Other)       February 19, 2019     Patient: Shaun Calvo Jr.   YOB: 1970   Date of Visit: 2/18/2019       To Whom It May Concern:    It is my medical opinion that Shaun Calvo should remain out of work from 2/18/19-2/24/19 given acute illness. A new work note will be provided after that time if needed.    If you have any questions or concerns, please don't hesitate to call.    Sincerely,         Harriet Vo MD

## 2021-06-18 NOTE — LETTER
Letter by Harriet Vo MD at      Author: Harriet Vo MD Service: -- Author Type: --    Filed:  Encounter Date: 2/5/2019 Status: (Other)       February 5, 2019     Patient: Shaun Calvo Jr.   YOB: 1970   Date of Visit: 2/5/2019       To Whom It May Concern:    Shaun Calvo was seen today in clinic for an injury. He needs the following work restrictions:  -no lifting greater than 10 lbs  -limited arm raising above level of shoulders and limited body twisting (should be <5% of work day)  -regular rest breaks (5 minute break every 2 hours)    He needs these work restrictions for the next 4 weeks to allow for adequate healing. If he needs restrictions longer than this, he will come back to clinic for re-evaluation.      If you have any questions or concerns, please don't hesitate to call.      Sincerely,         Harriet Vo MD

## 2021-06-21 ENCOUNTER — COMMUNICATION - HEALTHEAST (OUTPATIENT)
Dept: FAMILY MEDICINE | Facility: CLINIC | Age: 51
End: 2021-06-21

## 2021-06-21 ENCOUNTER — COMMUNICATION - HEALTHEAST (OUTPATIENT)
Dept: PALLIATIVE MEDICINE | Facility: OTHER | Age: 51
End: 2021-06-21

## 2021-06-21 DIAGNOSIS — M79.7 FIBROMYALGIA: ICD-10-CM

## 2021-06-21 DIAGNOSIS — S29.011D INTERCOSTAL MUSCLE STRAIN, SUBSEQUENT ENCOUNTER: ICD-10-CM

## 2021-06-21 RX ORDER — NAPROXEN 500 MG/1
500 TABLET ORAL 2 TIMES DAILY PRN
Qty: 28 TABLET | Refills: 0 | Status: SHIPPED | OUTPATIENT
Start: 2021-06-21 | End: 2021-07-16

## 2021-06-21 NOTE — LETTER
Letter by Angeli Mcelroy SW at      Author: Angeli Mcelroy SW Service: -- Author Type: --    Filed:  Encounter Date: 5/24/2021 Status: (Other)       CARE COORDINATION  [facility]    May 24, 2021    Shaun Calvo Jr.  [address]  [place] [address]      Dear Shaun,    I am a clinic care coordinator who works with Harriet Vo MD at [facility]. I wanted to thank you for spending the time to talk with me.  Below is a description of clinic care coordination and how I can further assist you.      The clinic care coordination team is made up of a registered nurse,  and community health worker who understand the health care system. The goal of clinic care coordination is to help you manage your health and improve access to the health care system in the most efficient manner. The team can assist you in meeting your health care goals by providing education, coordinating services, strengthening the communication among your providers and supporting you with any resource needs.    Please feel free to contact me at [phone] with any questions or concerns. We are focused on providing you with the highest-quality healthcare experience possible and that all starts with you.     Sincerely,     Angeli Mcelroy [facility]  Clinic Care Coordinator  [phone]      Enclosed: I have enclosed a copy of the Care Plan. This has helpful information and goals that we have talked about. Please keep this in an easy to access place to use as needed.

## 2021-06-21 NOTE — LETTER
Letter by Harriet Vo MD at      Author: Harriet Vo MD Service: -- Author Type: --    Filed:  Encounter Date: 2/2/2021 Status: (Other)         February 2, 2021     Patient: Shaun Calvo Jr.   YOB: 1970   Date of Visit: 2/2/2021       To Whom It May Concern:    Shaun Calvo was seen today for acute injury. Please excuse him from work today for this issue. He can return to work tomorrow with the following restrictions:  -no lifting >5-10 lbs  -no or minimal twisting/bending/pulling/lifting right arm  These restrictions should remain in place for 2 weeks - if shorter or longer duration is needed a new work note will be provided    If you have any questions or concerns, please don't hesitate to call.    Sincerely,         Harriet Vo MD
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
## 2021-06-21 NOTE — LETTER
Letter by Harriet Vo MD at      Author: Harriet Vo MD Service: -- Author Type: --    Filed:  Encounter Date: 5/19/2021 Status: (Other)         May 20, 2021     Patient: Shaun Calvo Jr.   YOB: 1970   Date of Visit: 5/19/2021       To Whom It May Concern:    Jaime is under my medical care and I am thoroughly familiar with his past medical history, including mental health disorders/emotional disability. Jaime receives great benefit from his emotional support animal, dog Gemma, including: decreased anger/rage, emotional stability, decrease in depression and anxiety. Because of these medical/mental health benefits, I recommend he be allowed to continue to have his dog Gemma with him at his residence.     If you have any questions or concerns, please don't hesitate to call.    Sincerely,        Electronically signed by Harriet Vo MD

## 2021-06-22 ENCOUNTER — COMMUNICATION - HEALTHEAST (OUTPATIENT)
Dept: FAMILY MEDICINE | Facility: CLINIC | Age: 51
End: 2021-06-22

## 2021-06-22 ENCOUNTER — COMMUNICATION - HEALTHEAST (OUTPATIENT)
Dept: SCHEDULING | Facility: CLINIC | Age: 51
End: 2021-06-22

## 2021-06-22 ENCOUNTER — HOSPITAL ENCOUNTER (OUTPATIENT)
Dept: MRI IMAGING | Facility: HOSPITAL | Age: 51
Discharge: HOME | End: 2021-06-22
Attending: FAMILY MEDICINE
Payer: OTHER MISCELLANEOUS

## 2021-06-22 DIAGNOSIS — M25.562 ACUTE PAIN OF LEFT KNEE: ICD-10-CM

## 2021-06-23 ENCOUNTER — COMMUNICATION - HEALTHEAST (OUTPATIENT)
Dept: FAMILY MEDICINE | Facility: CLINIC | Age: 51
End: 2021-06-23

## 2021-06-23 ENCOUNTER — AMBULATORY - HEALTHEAST (OUTPATIENT)
Dept: FAMILY MEDICINE | Facility: CLINIC | Age: 51
End: 2021-06-23

## 2021-06-23 DIAGNOSIS — M25.562 ACUTE PAIN OF LEFT KNEE: ICD-10-CM

## 2021-06-23 DIAGNOSIS — R93.6 ABNORMAL MRI, KNEE: ICD-10-CM

## 2021-06-23 DIAGNOSIS — S89.92XD INJURY OF LEFT KNEE, SUBSEQUENT ENCOUNTER: ICD-10-CM
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
## 2021-06-23 NOTE — TELEPHONE ENCOUNTER
Name of form/paperwork: FMLA and work note with dates of when patient will be missing work  Have you been seen for this request: Yes:  2/5/19  Do we have the form: Yes- see this [employer] message, the FMLA form is scanned in on patient's [employer]  When is form needed by: 3-5 business days  How would you like the form returned:   Fax Number: n/a  Patient Notified form requests are processed in 3-5 business days: Yes  (If patient needs form sooner, please note that in this message.)  Okay to leave a detailed message? Yes  [phone]    Patient was trying to get this to Dr. Vo at [facility], since she saw him for his injury on Tuesday.     Patient would like to know if Dr. Vo can fill out his FMLA form and the write him a work note to excuse from work, with dates as well. Patient stated he is not able to work and is in pain.     done

## 2021-06-24 ENCOUNTER — COMMUNICATION - HEALTHEAST (OUTPATIENT)
Dept: FAMILY MEDICINE | Facility: CLINIC | Age: 51
End: 2021-06-24

## 2021-06-24 NOTE — TELEPHONE ENCOUNTER
----- Message from Harriet Vo MD sent at 2/13/2019 10:24 PM CST -----  Call pt to discuss test results and that he needs STAT chest CT to make sure he doesn't have a blood clot.    Hi Shaun,    Your test results have returned.    The most important one is that your screening test for possible blood clot came back positive so we need to do a CT scan of your lungs to look for a clot. I am putting in the order now to be completely STAT but if you have worsening chest pain or shortness of breath you need to go to the ER immediately.    Your cholesterol level is mildly elevated but not high enough to need medication.  Your electrolytes and kidney function are normal.  Your heart enzyme (troponin) was normal which suggests against a heart attack.  You tested negative for diabetes.  Your hemoglobin (red blood cell level) is mildly low but not concerning at this time.    Thanks,  Dr Vo

## 2021-06-24 NOTE — PATIENT INSTRUCTIONS - HE
Call about cologuard kit to see if you should send it back or they will pick it up  Referral for colonoscopy placed today- you should get a phone call to schedule this    Look into medicinal marijuana programs if you are interested in that - [facility] has a program as does [facility] - calling and setting up an appointment    Start vistaril (hydroxyzine) for anxiety/panic attacks - 25 mg three times a day as needed  If it makes you sleepy, let me know and I can try to send in 12.5 mg capsules  If not helping and not making you sleepy, can increase to 50 mg

## 2021-06-24 NOTE — TELEPHONE ENCOUNTER
Reason contacted:  Results   Information relayed:  Below message, patient transferred to radiology to schedule the CT scan.   Additional questions:  No  Further follow-up needed:  No  Okay to leave a detailed message:  No
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
## 2021-06-24 NOTE — TELEPHONE ENCOUNTER
Medication Question or Clarification  Who is calling: Pharmacy: [facility]  What medication are you calling about? (include dose and sig) Hydroxyzine Pamoate 25mg capsules  Who prescribed the medication?: Harriet Vo MD    What is your question/concern?: The above medication is on  backorder. Please send new RX for Hydroxyzine HCL tablets  Pharmacy: [facility] to leave a detailed message?: No-speak to pharmacy staff  Site CMT - Please call the pharmacy to obtain any additional needed information.

## 2021-06-24 NOTE — PATIENT INSTRUCTIONS - HE
Will call or send [employer] message about test results    Take it easy for the rest of the week, no significant exertion  Let me know if you are needing more time off work, would need to complete paperwork for this
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
## 2021-06-24 NOTE — PATIENT INSTRUCTIONS - HE
Keep an eye on the abdomen lump - likely a hernia   We wouldn't consider surgery to fix this unless you are having pain in the area or the lump can't be pushed back in

## 2021-06-24 NOTE — TELEPHONE ENCOUNTER
Controlled Substance Refill Request  Medication:   Requested Prescriptions     Pending Prescriptions Disp Refills     oxyCODONE-acetaminophen (PERCOCET/ENDOCET) 5-325 mg per tablet 20 tablet 0     Sig: Take 1 tablet by mouth every 4 (four) hours as needed for pain.     Date Last Fill: 2/19/2019 #20  Pharmacy: [facility] [place], [place]  Submit electronically to pharmacy  Controlled Substance Agreement on File:   Encounter-Level CSA Scan Date:    There are no encounter-level csa scan date.       Last office visit 2/19/2019.: Last office visit pertaining to requested medication was 2/19/2019 with PCP Dr BREANNE Vo.
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
## 2021-06-25 ENCOUNTER — COMMUNICATION - HEALTHEAST (OUTPATIENT)
Dept: FAMILY MEDICINE | Facility: CLINIC | Age: 51
End: 2021-06-25
Payer: OTHER MISCELLANEOUS

## 2021-06-25 ENCOUNTER — COMMUNICATION - HEALTHEAST (OUTPATIENT)
Dept: FAMILY MEDICINE | Facility: CLINIC | Age: 51
End: 2021-06-25

## 2021-06-25 NOTE — TELEPHONE ENCOUNTER
RN cannot approve Refill Request    RN can NOT refill this medication med is not covered by policy/route to provider. Last office visit: 5/19/2021 Harriet Vo MD Last Physical: 9/10/2020 Last MTM visit: Visit date not found Last visit same specialty: 5/19/2021 Harriet Vo MD.  Next visit within 3 mo: Visit date not found  Next physical within 3 mo: Visit date not found      Patria Cisneros, [facility] Triage/Med Refill 6/16/2021    Requested Prescriptions   Pending Prescriptions Disp Refills     naproxen (NAPROSYN) 500 MG tablet 28 tablet 0       There is no refill protocol information for this order

## 2021-06-25 NOTE — ED TRIAGE NOTES
Patient fell of a step stool 2 weeks ago at work. He landed on his foot and twisted his knee. The pain was been gradually getting worse and he developed numbness in his left knee yesterday. Patient takes IM Toradol at home for chronic back pain.

## 2021-06-25 NOTE — TELEPHONE ENCOUNTER
Medication being requested: B complex  Last visit date: 5/18/2021 Provider: RICHA  Next visit date: N/A Provider: RICHA  Expected follow up: 8-12 weeks  [facility] visit ([facility]) date: N/A  Pertinent between visit information about requested medication (telephone, mychart, prior authorization, concerns): medication refills  Last date prescribed: 4/23/2021  Provider responsible: BE  Spoke with patient: no  Script being sent to provider - dates and quantity: B complex 30 days, 30 tabs for 5 refills  Pharmacy cued: [facility] in [place]

## 2021-06-25 NOTE — TELEPHONE ENCOUNTER
RN cannot approve Refill Request    RN can NOT refill this medication med is not covered by policy/route to provider. Last office visit: Visit date not found Last Physical: Visit date not found Last MTM visit: Visit date not found Last visit same specialty: 5/19/2021 Harriet Vo MD.  Next visit within 3 mo: Visit date not found  Next physical within 3 mo: Visit date not found      Priya Marshall, [facility] Triage/Med Refill 5/29/2021    Requested Prescriptions   Pending Prescriptions Disp Refills     naproxen (NAPROSYN) 500 MG tablet [Pharmacy Med Name: NAPROXEN 500MG TABLETS] 28 tablet 0     Sig: TAKE 1 TABLET(500 MG) BY MOUTH TWICE DAILY WITH MEALS FOR 14 DAYS       There is no refill protocol information for this order

## 2021-06-25 NOTE — TELEPHONE ENCOUNTER
RN cannot approve Refill Request    RN can NOT refill this medication med is not covered by policy/route to provider. Last office visit: Visit date not found Last Physical: Visit date not found Last MTM visit: Visit date not found Last visit same specialty: 5/19/2021 Harriet Vo MD.  Next visit within 3 mo: Visit date not found  Next physical within 3 mo: Visit date not found      Olena Steel, [facility] Triage/Med Refill 5/30/2021    Requested Prescriptions   Pending Prescriptions Disp Refills     naproxen (NAPROSYN) 500 MG tablet 28 tablet 0     Sig: TAKE 1 TABLET(500 MG) BY MOUTH TWICE DAILY WITH MEALS FOR 14 DAYS       There is no refill protocol information for this order

## 2021-06-25 NOTE — TELEPHONE ENCOUNTER
RN cannot approve Refill Request    RN can NOT refill this medication med is not covered by policy/route to provider. Last office visit: Visit date not found Last Physical: Visit date not found Last MTM visit: Visit date not found Last visit same specialty: 5/19/2021 Harriet Vo MD.  Next visit within 3 mo: Visit date not found  Next physical within 3 mo: Visit date not found      Olena Steel, [facility] Triage/Med Refill 5/30/2021    Requested Prescriptions   Pending Prescriptions Disp Refills     cyclobenzaprine (FLEXERIL) 10 MG tablet 60 tablet 0       There is no refill protocol information for this order

## 2021-06-25 NOTE — TELEPHONE ENCOUNTER
RN cannot approve Refill Request    RN can NOT refill this medication med is not covered by policy/route to provider. Last office visit: Visit date not found Last Physical: Visit date not found Last MTM visit: Visit date not found Last visit same specialty: 5/19/2021 Harriet Vo MD.  Next visit within 3 mo: Visit date not found  Next physical within 3 mo: Visit date not found      Priya Marshall, [facility] Triage/Med Refill 5/30/2021    Requested Prescriptions   Pending Prescriptions Disp Refills     cyclobenzaprine (FLEXERIL) 10 MG tablet 60 tablet 0       There is no refill protocol information for this order        naproxen (NAPROSYN) 500 MG tablet 28 tablet 0     Sig: TAKE 1 TABLET(500 MG) BY MOUTH TWICE DAILY WITH MEALS FOR 14 DAYS       There is no refill protocol information for this order

## 2021-06-25 NOTE — TELEPHONE ENCOUNTER
Please call pt to let him know that his nerve test showed mild to moderate carpal tunnel syndrome in both wrists/hands. The best course of treatment at this time would be wearing wrist braces at night - I have placed a DME order that can be printed and given to pt. If the braces do not work then typically we refer to hand specialist for injection vs surgery.    Thanks  Dr Vo

## 2021-06-25 NOTE — PROGRESS NOTES
CHW reviewed CCC SW initial assessment.    CHW delegations:Delegating to CHW to contact in less than 30 days.     Next outreach due: 6/23/21

## 2021-06-25 NOTE — TELEPHONE ENCOUNTER
Medication being requested: Lyrica  Last visit date: 05/18.  Provider: BE  Next visit date: Not Schedule.  Provider: BE  Expected follow up: 8-12 wks  [facility] visit ([facility]) date: N/A  UDT date: N/A  Agreement date: N/A   (Last fill date; name; strength; provider; MME; quantity):  Last filled 04/01  #30 for 30 days  Pertinent between visit information about requested medication (telephone, mychart, prior authorization, concerns, comments): None noted  Script being sent to provider by nurse- dates and quantity:   Requested Prescriptions     Pending Prescriptions Disp Refills     pregabalin (LYRICA) 100 MG capsule 30 capsule 0     Sig: Take 1 capsule (100 mg total) by mouth daily.     Pharmacy cued: [facility]  Standing orders for withdrawal protocol implemented: N/A

## 2021-06-25 NOTE — TELEPHONE ENCOUNTER
Left message to call back for: test results within this encounter   Information to relay to patient:  Dr Goldman notes below

## 2021-06-25 NOTE — ED NOTES
RN agrees with triage note. Denies shortness of breath, Denies CP, Denies LOC, Denies NVD, Denies fevers/cough, Denies other complaints.

## 2021-06-25 NOTE — TELEPHONE ENCOUNTER
Patient called back and was informed of Dr. Goldman note. Will follow up after using the braces to inform Dr. Vo if they are helping or not. No further questions

## 2021-06-25 NOTE — TELEPHONE ENCOUNTER
RN cannot approve Refill Request    RN can NOT refill this medication med is not covered by policy/route to provider. Last office visit: Visit date not found Last Physical: Visit date not found Last MTM visit: Visit date not found Last visit same specialty: 5/19/2021 Harriet Vo MD.  Next visit within 3 mo: Visit date not found  Next physical within 3 mo: Visit date not found      Priya Marshall, [facility] Triage/Med Refill 5/30/2021    Requested Prescriptions   Pending Prescriptions Disp Refills     cyclobenzaprine (FLEXERIL) 10 MG tablet [Pharmacy Med Name: CYCLOBENZAPRINE 10MG TABLETS] 60 tablet 0     Sig: TAKE 1 TABLET(10 MG) BY MOUTH THREE TIMES DAILY AS NEEDED FOR MUSCLE SPASMS       There is no refill protocol information for this order

## 2021-06-25 NOTE — PROGRESS NOTES
Patient presents at the request of Dr. Harriet Vo for a bilateral upper extremity EMG.  He has right greater than left hand numbness and tingling all the fingertips and right greater than left thumb.  He has a sense of dropping objects or weakness through his hands.  He is right-handed.  On exam he has mild decreased sensation to light touch in the fingers both hands in the right deltoid.  Normal strength bilateral upper extremities and negative Rome's.    EMG/NCS  results: Please see scanned full report.    Comment NCS: Abnormal study:    1.  Prolonged latency and borderline amplitude right median SNAP  2.  Prolonged latency left greater than right median transcarpal study with decreased amplitude on the left.   3.  Borderline left normal right median CMAP latency and decreased amplitude on the left.  4.  Prolonged bilateral median versus ulnar transcarpal latency comparison.  5.  Normal right ulnar SNAP, bilateral ulnar transcarpal studies, bilateral ulnar CMAP's with Marinacci anastomosis on the right.    Comment EMG: Normal study.  1.  Normal needle EMG bilateral upper extremity.    Interpretation: Abnormal study: There is electrodiagnostic evidence of:    1.  Left median neuropathy at the wrist consistent with entrapment in the carpal tunnel, mild-moderate in severity.    2.  Right median neuropathy at the wrist consistent with entrapment in the carpal tunnel, mild in severity.    3. There is no electrodiagnostic evidence of cervical radiculopathy, brachial plexopathy, or ulnar neuropathy in the bilateral upper extremities.    The testing was completed in its entirety by the physician.      It was our pleasure caring for your patient today, if there any questions or concerns please do not hesitate to contact us.

## 2021-06-26 NOTE — TELEPHONE ENCOUNTER
Pt advised ortho is the next step, states he already has an appointment at 10 AM tomorrow morning (6/25/21) at [facility] in [facility].

## 2021-06-26 NOTE — PROGRESS NOTES
Clinic Care Coordination Contact    Situation: Patient chart reviewed by care coordinator.      Assessment: Call from patient.  He has MRI set up for Friday.  Got a call from [employer] as they are handling his worker's comp claim. He has not file for worker's comp before and it was discussed.      Plan/Recommendations: Will follow up with patient in 5-10 days to review next steps for goal attainment and other needs.   PADMINI Harris  Clinic Care Coordinator  [phone]        Clinic Care Coordination Contact    Follow Up Progress Note      Assessment: He is anxious to set up MRI on his knee. Provided phone number at [facility] for imaging so he can call. Is working with his manager at [facility] to see if this will be workman's comp case.       Did not address goals as he was driving.   Intervention/Education provided during outreach: imaging phone number.      Outreach Frequency: monthly    Plan:   Delegation to CHW as was previously set up.   Care Coordinator will follow up in one month.  PADMINI Harris  Clinic Care Coordinator  [phone]          Clinic Care Coordination Contact  [facility]/Voicemail       Clinical Data: Care Coordinator Outreach Patient recently in ED for knee pain. Had follow up with PCP on 6-17.      Outreach attempted x 1.  Left message on patient's voicemail with call back information and requested return call.  Plan: Delegating to CHW to contact in 5-10 business days as previously scheduled.  CC available as needed in in 3 weeks.   PADMINI Harris  Clinic Care Coordinator  [phone]

## 2021-06-26 NOTE — TELEPHONE ENCOUNTER
----- Message from Harriet Vo MD sent at 2021  9:19 AM CDT -----  Please call pt with MRI results.    Hi Jaime,    Your MRI result has returned and shows a number of issues in the knee includin. Torn meniscus  2. Severe patellofemoral syndrome  3. Moderate patellar subluxation (abnormal movement of the patella)  4. 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 Dr Vo recommends orthopedic referral and has placed this as urgent. You will get a phone call to schedule this. For now, limit activity and weight bearing - no working - if you need crutches we can get those for you.    Thanks  Dr Vo

## 2021-06-26 NOTE — TELEPHONE ENCOUNTER
Thanks for providing this information - I will put it in my notes to make sure they are clearly worker's comp related.    Dr Vo

## 2021-06-26 NOTE — TELEPHONE ENCOUNTER
Medication being requested: Vitamin D  Last visit date: 5/18 Provider: RICHA  Next visit date: not scheduled Provider: RICHA  Expected follow up: 8-12 weeks  [facility]M visit ([facility]) date: N/A  Pertinent between visit information about requested medication (telephone, mychart, prior authorization, concerns): N/A  Last date prescribed: 2/15 with 2 refills  Provider responsible: BE  Spoke with patient: No  Script being sent to provider - dates and quantity:  Requested Prescriptions     Pending Prescriptions Disp Refills     cholecalciferol, vitamin D3, 1,000 unit (25 mcg) tablet 120 tablet 1     Sig: Take 2 tablets 2 times daily        Pharmacy cued: [facility]

## 2021-06-26 NOTE — TELEPHONE ENCOUNTER
RN cannot approve Refill Request    RN can NOT refill this medication med is not covered by policy/route to provider. Last office visit: 6/17/2021 Harriet Vo MD Last Physical: 9/10/2020 Last MTM visit: Visit date not found Last visit same specialty: 6/17/2021 Harriet Vo MD.  Next visit within 3 mo: Visit date not found  Next physical within 3 mo: Visit date not found      Olena Steel, [facility] Triage/Med Refill 6/21/2021    Requested Prescriptions   Pending Prescriptions Disp Refills     naproxen (NAPROSYN) 500 MG tablet 28 tablet 0       There is no refill protocol information for this order

## 2021-06-26 NOTE — ED PROVIDER NOTES
EMERGENCY DEPARTMENT ENCOUNTER     NAME: Shaun Calvo Jr.   AGE: 51 y.o. male   YOB: 1970   MRN: [id number]   EVALUATION DATE & TIME: 6/16/2021  1:37 PM   PCP: Harriet Vo MD     Chief Complaint   Patient presents with     Knee Pain   :    FINAL IMPRESSION       1. Acute pain of left knee           ED COURSE & MEDICAL DECISION MAKING    2:00 PM I met with the patient, obtained history, performed an initial exam, and discussed options and plan for diagnostics and treatment here in the ED. PPE: Gloves, surgical mask, N95 mask, safety glasses.   2:29 PM X-ray reviewed. Patient ready for discharge.  Pertinent Labs & Imaging studies reviewed. (See chart for details)   51 y.o. male  presents to the Emergency Department for evaluation of left knee pain after an injury 2 weeks ago.. Initial Vitals Reviewed. Initial exam notable for generally well-appearing male with some tenderness over the lateral joint line but no deformity or laxity.  It is concerning for a soft tissue injury including a meniscal or ligamentous injury versus sprain.  It is less likely fracture or dislocation given the fact that he is been ambulatory on it for 2 weeks.  We did start with an x-ray here in the ED which is negative.  He actually has an appointment scheduled at 2 PM tomorrow with his primary care doctor and I think this is a good next step.  He may end up needing an MRI but this could be ordered by his PCP or he could be referred to orthopedics from there.  I will give a note for work per his request and discharged with these instructions.           At the conclusion of the encounter I discussed the results of all of the tests and the disposition. The questions were answered. The patient or family acknowledged understanding and was agreeable with the care plan.         MEDICATIONS GIVEN IN THE EMERGENCY:   Medications - No data to display   NEW PRESCRIPTIONS STARTED AT TODAY'S ER VISIT   Current Discharge Medication  List      CONTINUE these medications which have NOT CHANGED    Details   albuterol (PROAIR HFA;PROVENTIL HFA;VENTOLIN HFA) 90 mcg/actuation inhaler Inhale 2 puffs every 4 (four) hours as needed.  Qty: 8.5 g, Refills: 5    Comments: May substitute the equivalent medication per insurance preference.  Associated Diagnoses: Mild intermittent asthma without complication      B complex-vitamin C-folic acid 400 mcg tablet Take 1 tablet by mouth daily.  Qty: 30 tablet, Refills: 5    Associated Diagnoses: Fibromyalgia      cholecalciferol, vitamin D3, 1,000 unit (25 mcg) tablet 1 tab daily 1 week, 2 tabs daily 1 weeks, 3 tab daily 1 week, then 4 daily  Qty: 120 tablet, Refills: 2    Associated Diagnoses: Fibromyalgia      cyclobenzaprine (FLEXERIL) 10 MG tablet TAKE 1 TABLET(10 MG) BY MOUTH THREE TIMES DAILY AS NEEDED FOR MUSCLE SPASMS  Qty: 60 tablet, Refills: 0    Associated Diagnoses: Spondylosis of lumbar region without myelopathy or radiculopathy; Lumbar disc herniation; Lumbar radiculitis      fish oil-omega-3 fatty acids (FISH OIL) 300-1,000 mg capsule Take 2 g by mouth daily. OTC      hydroCHLOROthiazide (HYDRODIURIL) 25 MG tablet Take 1 tablet (25 mg total) by mouth daily.  Qty: 90 tablet, Refills: 3    Associated Diagnoses: Essential hypertension      hydrOXYzine HCL (ATARAX) 25 MG tablet TAKE 1 TABLET BY MOUTH THREE TIMES DAILY AS NEEDED FOR ANXIETY(PANIC ATTACK)  Qty: 60 tablet, Refills: 3    Associated Diagnoses: Anxiety      !! ketorolac (TORADOL) 30 mg/mL (1 mL) injection       !! ketorolac (TORADOL) 30 mg/mL (1 mL) injection Inject 1 mL (30 mg total) into the shoulder, thigh, or buttocks 3 (three) times a day as needed. No more than eight doses every two weeks  Qty: 8 mL, Refills: 2    Associated Diagnoses: Chronic low back pain, unspecified back pain laterality, unspecified whether sciatica present      lisinopriL (PRINIVIL,ZESTRIL) 20 MG tablet Take 1 tablet (20 mg total) by mouth daily.  Qty: 30 tablet,  Refills: 11    Associated Diagnoses: Essential hypertension      melatonin 10 mg cap Take 1 tablet by mouth at bedtime.  Qty: 30 capsule, Refills: 2    Associated Diagnoses: Chronic low back pain, unspecified back pain laterality, unspecified whether sciatica present      naproxen (NAPROSYN) 500 MG tablet TAKE 1 TABLET(500 MG) BY MOUTH TWICE DAILY WITH MEALS FOR 14 DAYS  Qty: 28 tablet, Refills: 0    Associated Diagnoses: Intercostal muscle strain, subsequent encounter      omeprazole (PRILOSEC) 20 MG capsule Take 20 mg by mouth daily before breakfast. OTC      pramipexole (MIRAPEX) 0.25 MG tablet Take 1 tablet (0.25 mg total) by mouth 2 (two) times a day.  Qty: 180 tablet, Refills: 1    Associated Diagnoses: Restless legs syndrome (RLS)      pregabalin (LYRICA) 100 MG capsule Take 1 capsule (100 mg total) by mouth daily.  Qty: 30 capsule, Refills: 0    Associated Diagnoses: Chronic pain syndrome       !! - Potential duplicate medications found. Please discuss with provider.         ================================================================   HISTORY OF PRESENT ILLNESS       Patient information was obtained from: Patient   Use of Intrepreter: N/A    Shaun Calvo Jr. is a 51 y.o. male with history of Afib, HTN, asthma, anxiety, depression who presents to the ED for evaluation of knee pain.     The patient complains of lateral left knee pain. The patient reports falling off a step ladder at his job. He fell off and missed the last two steps, landed on his foot and twisted his knee. Immediately after the fall he was able to ambulate. This accident occured two weeks ago and the pain isn't going away. Yesterday (6/15) he noticed numbness localized to the area of the pain. The patient also wraps the knee to decrease swelling with success. The patient has continues to work despite the pain. Of note, patient does have an appointment with his PCP tomorrow.      ================================================================    REVIEW OF SYSTEMS       Review of Systems   Musculoskeletal:        Positive for left lateral knee pain and swelling.   Neurological: Positive for numbness.   All other systems reviewed and are negative.      PAST HISTORY     PAST MEDICAL HISTORY:   Past Medical History:   Diagnosis Date     Anxiety      Asthma      Atrial fibrillation (H)      Depression      Hypertension       PAST SURGICAL HISTORY:   History reviewed. No pertinent surgical history.   CURRENT MEDICATIONS:   No current facility-administered medications on file prior to encounter.      Current Outpatient Medications on File Prior to Encounter   Medication Sig     albuterol (PROAIR HFA;PROVENTIL HFA;VENTOLIN HFA) 90 mcg/actuation inhaler Inhale 2 puffs every 4 (four) hours as needed.     B complex-vitamin C-folic acid 400 mcg tablet Take 1 tablet by mouth daily.     cholecalciferol, vitamin D3, 1,000 unit (25 mcg) tablet 1 tab daily 1 week, 2 tabs daily 1 weeks, 3 tab daily 1 week, then 4 daily (Patient taking differently: Take 2,000 Units by mouth 2 (two) times a day. )     cyclobenzaprine (FLEXERIL) 10 MG tablet TAKE 1 TABLET(10 MG) BY MOUTH THREE TIMES DAILY AS NEEDED FOR MUSCLE SPASMS     fish oil-omega-3 fatty acids (FISH OIL) 300-1,000 mg capsule Take 2 g by mouth daily. OTC     hydroCHLOROthiazide (HYDRODIURIL) 25 MG tablet Take 1 tablet (25 mg total) by mouth daily.     hydrOXYzine HCL (ATARAX) 25 MG tablet TAKE 1 TABLET BY MOUTH THREE TIMES DAILY AS NEEDED FOR ANXIETY(PANIC ATTACK)     ketorolac (TORADOL) 30 mg/mL (1 mL) injection      ketorolac (TORADOL) 30 mg/mL (1 mL) injection Inject 1 mL (30 mg total) into the shoulder, thigh, or buttocks 3 (three) times a day as needed. No more than eight doses every two weeks     lisinopriL (PRINIVIL,ZESTRIL) 20 MG tablet Take 1 tablet (20 mg total) by mouth daily.     melatonin 10 mg cap Take 1 tablet by mouth at bedtime.      naproxen (NAPROSYN) 500 MG tablet TAKE 1 TABLET(500 MG) BY MOUTH TWICE DAILY WITH MEALS FOR 14 DAYS     omeprazole (PRILOSEC) 20 MG capsule Take 20 mg by mouth daily before breakfast. OTC     pramipexole (MIRAPEX) 0.25 MG tablet Take 1 tablet (0.25 mg total) by mouth 2 (two) times a day.     pregabalin (LYRICA) 100 MG capsule Take 1 capsule (100 mg total) by mouth daily.      ALLERGIES:   Allergies   Allergen Reactions     Penicillins Anaphylaxis      FAMILY HISTORY:   Family History   Problem Relation Age of Onset     Heart disease Father       SOCIAL HISTORY:   Social History     Socioeconomic History     Marital status: Single     Spouse name: None     Number of children: None     Years of education: None     Highest education level: None   Occupational History     None   Social Needs     Financial resource strain: Hard     Food insecurity     Worry: Never true     Inability: Never true     Transportation needs     Medical: No     Non-medical: No   Tobacco Use     Smoking status: Former Smoker     Smokeless tobacco: Never Used   Substance and Sexual Activity     Alcohol use: None     Drug use: None     Sexual activity: None   Lifestyle     Physical activity     Days per week: 0 days     Minutes per session: 0 min     Stress: Very much   Relationships     Social connections     Talks on phone: Twice a week     Gets together: More than three times a week     Attends Mormon service: Never     Active member of club or organization: No     Attends meetings of clubs or organizations: Never     Relationship status:      Intimate partner violence     Fear of current or ex partner: No     Emotionally abused: No     Physically abused: No     Forced sexual activity: No   Other Topics Concern     None   Social History Narrative    The patient is originally from [place], [place] and went to [employer]. He did not graduate. He has worked as a  for much of his life. 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 After he was released from long-term, he looked for work in [place], [place] but could not find any and subsequently returned to [place]. He is currently living at the [facility]. He  in 2004. He has a daughter, Mayra Valdez, who is 23 and with whom he has no contact.        VITALS  Patient Vitals for the past 24 hrs:   BP Temp Temp src Pulse Resp SpO2 Height Weight   06/16/21 1411 (!) 163/95 -- -- 63 -- -- -- --   06/16/21 1326 156/90 97.6  F (36.4  C) Oral 75 16 100 % 6' 2\" (1.88 m) (!) 307 lb (139.3 kg)        ================================================================    PHYSICAL EXAM     VITAL SIGNS: BP (!) 163/95   Pulse 63   Temp 97.6  F (36.4  C) (Oral)   Resp 16   Ht 6' 2\" (1.88 m)   Wt (!) 307 lb (139.3 kg)   SpO2 100%   BMI 39.42 kg/m     Constitutional:  Awake, no acute distress   HENT:  Atraumatic, oropharynx without exudate or erythema, membranes moist  Lymph:  No adenopathy  Eyes: EOM intact, PERRL, no injection  Neck: Supple  Respiratory:  Clear to auscultation bilaterally, no wheezes or crackles   Cardiovascular:  Regular rate and rhythm, single S1 and S2   GI:  Soft, nontender, nondistended, no rebound or guarding   Musculoskeletal: Tenderness to lateral joint line of the left knee. Negative anterior drawer. No laxity, visual swelling or deformity. Moves all extremities, no lower extremity edema, no deformities    Skin:  Warm, dry  Neurologic:  Alert and oriented x3, no focal deficits noted       ================================================================  LAB       All pertinent labs reviewed and interpreted.   No results found for this visit on 06/16/21.     ===============================================================  RADIOLOGY       Reviewed all pertinent imaging. Please see official radiology report.   Xr Knee Left 1 Or 2 Vws    Result Date: 6/16/2021  EXAM: XR KNEE LEFT 1 OR 2 VWS LOCATION: [facility] " [facility] DATE/TIME: 6/16/2021 2:07 PM INDICATION: Left knee pain. COMPARISON: None.     Moderate degenerative changes in the medial and lateral compartments. No fracture. Small joint effusion.         ================================================================  EKG         I have independently reviewed and interpreted the EKG(s) documented above.     ================================================================  PROCEDURES         I, Juancarlos Menjivar, am serving as a scribe to document services personally performed by Dr. Lutz based on my observation and the provider's statements to me. I, Laquita Lutz MD attest that Juancarlos Menjivar is acting in a scribe capacity, has observed my performance of the services and has documented them in accordance with my direction.   Laquita Lutz M.D.   Emergency Medicine   [facility] EMERGENCY DEPARTMENT  [address]  [facility] [address]  Dept: [phone]  Loc: [phone]      Laquita Lutz MD  06/16/21 1434

## 2021-06-26 NOTE — TELEPHONE ENCOUNTER
Next step is orthopedic consultation - referral has been placed.    I believe we received the worker's compensation information.    Dr Vo

## 2021-06-26 NOTE — TELEPHONE ENCOUNTER
Reason contacted:  Test results within this encounter   Information relayed:  Dr Vo's notes below   Additional questions:  Yes- Pt is wondering what is the next step? And want's to let Dr Vo know that he will be contacting a workcamp lower and he will sent in someforms   Further follow-up needed:  No  Okay to leave a detailed message:  No

## 2021-06-26 NOTE — TELEPHONE ENCOUNTER
Can someone call to see if pt's knee MRI can be done sooner than Friday as currently scheduled? Pt had knee buckle last night and now symptoms are worse. If he is unable to get in sooner then please call pt to let him know that he should go to [facility].    Thanks  Dr Vo

## 2021-06-26 NOTE — TELEPHONE ENCOUNTER
Patient suffered left knee injury at work approximately 2 weeks ago. He re-injured last night going down a step. He states now he is unable to bear more than 5 pounds of weight. He rates his pain at 9/10. He is unable to work and would like a telephone appointment ASAP. Transferred to a  for an appointment.Patient verbalized understanding and agrees with plan.     Anne Robison RN  [facility] Nurse Advisor  8:50 AM 6/22/2021    Reason for Disposition    SEVERE pain (e.g., excruciating)    Additional Information    Negative: Major bleeding (actively dripping or spurting) that can't be stopped    Negative: Bullet, stabbed by knife or other serious penetrating wound    Negative: Looks like a dislocated joint (crooked or deformed)    Negative: Serious injury with multiple fractures (broken bones)    Negative: Sounds like a life-threatening emergency to the triager    Negative: Wound looks infected    Negative: Can't stand (bear weight) or walk    Negative: Skin is split open or gaping (length > 1/2 inch or 12 mm)    Negative: Bleeding won't stop after 10 minutes of direct pressure (using correct technique)    Negative: Dirt in the wound and not removed after 15 minutes of scrubbing    Negative: Sounds like a serious injury to the triager    Negative: Looks infected (e.g., spreading redness, pus, red streak)    Protocols used: KNEE INJURY-A-OH

## 2021-06-26 NOTE — TELEPHONE ENCOUNTER
FYI -    Reason contacted:  Status update  Information relayed:    Patient is calling to provide the claim number and point of contact for his work comp case.    Claim number: [id number]  : Fatoumata Angel - phone number: [phone]    Patient will be signing a release of information in order for his medical records related to his knee injury to get sent to [employer].   Additional questions:  No  Further follow-up needed:  No  Okay to leave a detailed message:  No

## 2021-06-27 ENCOUNTER — HEALTH MAINTENANCE LETTER (OUTPATIENT)
Age: 51
End: 2021-06-27

## 2021-06-29 ENCOUNTER — COMMUNICATION - HEALTHEAST (OUTPATIENT)
Dept: FAMILY MEDICINE | Facility: CLINIC | Age: 51
End: 2021-06-29

## 2021-06-29 DIAGNOSIS — S89.92XD INJURY OF LEFT KNEE, SUBSEQUENT ENCOUNTER: ICD-10-CM

## 2021-06-29 DIAGNOSIS — M25.562 ACUTE PAIN OF LEFT KNEE: ICD-10-CM

## 2021-06-30 ENCOUNTER — COMMUNICATION - HEALTHEAST (OUTPATIENT)
Dept: PALLIATIVE MEDICINE | Facility: OTHER | Age: 51
End: 2021-06-30

## 2021-06-30 DIAGNOSIS — M54.50 CHRONIC LOW BACK PAIN, UNSPECIFIED BACK PAIN LATERALITY, UNSPECIFIED WHETHER SCIATICA PRESENT: ICD-10-CM

## 2021-06-30 DIAGNOSIS — G89.29 CHRONIC LOW BACK PAIN, UNSPECIFIED BACK PAIN LATERALITY, UNSPECIFIED WHETHER SCIATICA PRESENT: ICD-10-CM

## 2021-06-30 RX ORDER — KETOROLAC TROMETHAMINE 30 MG/ML
30 INJECTION, SOLUTION INTRAMUSCULAR; INTRAVENOUS 3 TIMES DAILY PRN
Qty: 8 ML | Refills: 2 | Status: SHIPPED | OUTPATIENT
Start: 2021-06-30 | End: 2021-07-16

## 2021-06-30 NOTE — PROGRESS NOTES
Progress Notes by Angeli Mcelroy SW at 5/24/2021  8:30 AM     Author: Angeli Mcelroy SW Service: -- Author Type:     Filed: 5/24/2021 10:15 AM Encounter Date: 5/24/2021 Status: Signed    : Angeli Mcelroy SW ()       Clinic Care Coordination Contact    Clinic Care Coordination Contact  OUTREACH    Referral Information:  Referral Source: PCP    Primary Diagnosis: Psychosocial    Chief Complaint   Patient presents with   ? Clinic Care Coordination - Initial        Universal Utilization: Appropriate.  Clinic Utilization  Difficulty keeping appointments:: No  Compliance Concerns: No  No-Show Concerns: No  No PCP office visit in Past Year: No  Utilization    Last refreshed: 5/24/2021  9:53 AM: Hospital Admissions 0           Last refreshed: 5/24/2021  9:53 AM: ED Visits 1           Last refreshed: 5/24/2021  9:53 AM: No Show Count (past year) 2              Current as of: 5/24/2021  9:53 AM              Clinical Concerns:  Current Medical Concerns:  Chronic back pain, working with pain clinic.    Current Behavioral Concerns: Depression, anxiety.     Education Provided to patient: Reviewed role of care coordination, resources.    Pain  Pain (GOAL):: No  Health Maintenance Reviewed: Due  Clinical Pathway: None     Medication Management:  No concerns.      Functional Status:  Dependent ADLs:: Ambulation-no assistive device, Independent  Dependent IADLs:: Independent  Bed or wheelchair confined:: No  Mobility Status: Independent  Fallen 2 or more times in the past year?: No  Any fall with injury in the past year?: No    Living Situation:  Current living arrangement:: I live in a private home with spouse  Type of residence:: Apartment    Lifestyle & Psychosocial Needs:  Lifestyle   ? Physical activity     Days per week: 0 days     Minutes per session: 0 min   ? Stress: Very much     Social Needs   ? Financial resource strain: Hard   ? Food insecurity     Worry: Never true     Inability:  Never true   ? Transportation needs     Medical: No     Non-medical: No     Diet:: Regular  Inadequate nutrition (GOAL):: No  Tube Feeding: No  Inadequate activity/exercise (GOAL):: No  Significant changes in sleep pattern (GOAL): No  Transportation means:: Regular car     Restoration or spiritual beliefs that impact treatment:: No  Mental health DX:: Yes  Mental health DX how managed:: Medication  Mental health management concern (GOAL):: No  Chemical Dependency Status: Not Applicable  Informal Support system:: Spouse   Socioeconomic History   ? Marital status: Single     Spouse name: Not on file   ? Number of children: Not on file   ? Years of education: Not on file   ? Highest education level: Not on file   Relationships   ? Social connections     Talks on phone: Twice a week     Gets together: More than three times a week     Attends Sabianist service: Never     Active member of club or organization: No     Attends meetings of clubs or organizations: Never     Relationship status:    ? Intimate partner violence     Fear of current or ex partner: No     Emotionally abused: No     Physically abused: No     Forced sexual activity: No     Tobacco Use   ? Smoking status: Former Smoker   ? Smokeless tobacco: Never Used          Patient lives with spouse in basement of house. It is not a legal apartment, no lease, but they do pay rent of $700.  They were unable to pay rent timely over past year, but did catch up with stimulus money.  Now, vini is asking them to move out as she needs the space. He works 6 hour shifts at [employer] and it is difficult with his chronic pain. Would like to get on Social Security disability and he applied and denied in 2011. His wife is currently getting $1400 per month from her past workplace, [employer] for long term disability.  She is applying for Social Security disability.  They have poor credit and he has a felony conviction on his record complicating the possible rental options.  He needs to remain in [facility].  They have three dogs, two of which are emotional support animals.  He is worried that the owner of the house may turn them out without any notice. He does not trust the owner or the owner's family. They have not found any options to rent yet. They have computer access. He is willing to make calls. No food insecurity.  He has [place] [employer].  They have tried to apply for [facility] benefits and have been denied.  He makes about $1400 a month and rent is $700.  Gave him [employer] to call to discuss any legal options at his current home.  Recommended he contact [address] as well. Emailed several options for felony friendly rentals.  Discussed using [facility] for homeless resources. He was homeless when he was released from jail and does not want to experience that again.     Writer communicated the risks of unencrypted electronic communication and the patient and/or patient representative has agreed to accept the risks and receive unencrypted communication related to the information or resources we have discussed. We reviewed that no PHI will be included.  Email address verified with the patient.  Will include electronic communication form for patient/caregiver to complete.         Resources and Interventions:  Current Resources:      Community Resources: None  Supplies Currently Used at Home: None  Equipment Currently Used at Home: none  Type of Employment: Part-time(6 hours per shift, [employer])       Advance Care Plan/Directive  Advanced Care Plans/Directives on file:: No  Advanced Care Plan/Directive Status: Declined Further Information    Referrals Placed: Community Resources([employer], [employer], [employer], [employer], [employer], 211) disability Specialists     Goals:   Goals        General    Financial Wellbeing (pt-stated)     Notes - Note created  5/24/2021  9:48 AM by Angeli Mcelroy, SW    2. Goal Statement: I will apply for Social Security Disability within  6 months.  Date Goal set: May 24, 2021  Barriers: worried about housing.  Strengths: Applied before so understands the process.  Date to Achieve By: November 24, 2021  Patient expressed understanding of goal: yes  Action steps to achieve this goal:  1. I will call Disability Specialists to learn if I want to use them.  2. I will complete all forms and applications as needed.  3. I will report progress towards this goal at scheduled outreach telephone calls from the CCC team.           Functional (pt-stated)     Notes - Note created  5/24/2021  9:51 AM by Angeli Mcelroy SW    1. Goal Statement: We will have secure affordable housing within 6 months.   Date Goal set: May 24, 2021  Barriers: credit scores, record, need subsidized  Strengths: Can look online and make calls.   Date to Achieve By: November 24, 2021  Patient expressed understanding of goal: yes  Action steps to achieve this goal:  1. I will call the resources provided to me from [facility].  2. I will apply to be on waiting lists as needed.  3. I will report progress towards this goal at scheduled outreach telephone calls from the CCC team.                 Patient/Caregiver understanding: Will work on goals.  Enrolled in care coordination.     Outreach Frequency: monthly  Future Appointments              In 1 week Danny Flores, DO; [facility]; [facility] NURSE [facility]          Plan: Delegating to [facility] to contact in less than 30 days.  Sending CP via My Chart.  FERNANDO CC available as needed and in 45 days.   PADMINI Harris  Clinic Care Coordinator  [phone]

## 2021-07-03 NOTE — ADDENDUM NOTE
Addendum Note by Sheryl Yeager at 1/6/2021  1:40 PM     Author: Sheryl Yeager Service: -- Author Type: --    Filed: 1/12/2021  1:05 PM Date of Service: 1/6/2021  1:40 PM Status: Signed    : Sheryl Yeager    Encounter addended by: Sheryl Yeager on: 1/12/2021  1:05 PM      Actions taken: Charge Capture section accepted

## 2021-07-03 NOTE — ADDENDUM NOTE
Addendum Note by Hilda Ruiz LPN at 1/6/2021  1:40 PM     Author: Hilda Ruiz LPN Service: -- Author Type: Licensed Nurse    Filed: 1/7/2021  2:52 PM Date of Service: 1/6/2021  1:40 PM Status: Signed    : Hilda Ruiz LPN (Licensed Nurse)    Encounter addended by: Hilda Ruiz LPN on: 1/7/2021  2:52 PM      Actions taken: Clinical Note Signed

## 2021-07-04 NOTE — ADDENDUM NOTE
Addendum Note by Stan Paez MD at 3/2/2021 11:31 AM     Author: Stan Paez MD Service: -- Author Type: Physician    Filed: 3/2/2021 11:31 AM Encounter Date: 2/28/2021 Status: Signed    : Stan Paez MD (Physician)    Addended by: STAN PAEZ on: 3/2/2021 11:31 AM        Modules accepted: Orders

## 2021-07-04 NOTE — TELEPHONE ENCOUNTER
Telephone Encounter by Lianne Parrish at 6/30/2021 11:37 AM     Author: Lianne Parrish Service: -- Author Type: Patient Access    Filed: 6/30/2021 11:37 AM Encounter Date: 6/30/2021 Status: Signed    : Lianne Parrish (Patient Access)       Scheduled for 07-16-21

## 2021-07-04 NOTE — ADDENDUM NOTE
Addendum Note by Sheryl Yeager at 5/18/2021  9:40 AM     Author: Sheryl Yeager Service: -- Author Type: --    Filed: 5/20/2021  7:23 AM Date of Service: 5/18/2021  9:40 AM Status: Signed    : Sheryl Yeager    Encounter addended by: Sheryl Yeager on: 5/20/2021  7:23 AM      Actions taken: Charge Capture section accepted

## 2021-07-04 NOTE — TELEPHONE ENCOUNTER
Telephone Encounter by Eulalia Vance RN at 6/30/2021 10:54 AM     Author: Eulalia Vance RN Service: -- Author Type: Registered Nurse    Filed: 6/30/2021 11:04 AM Encounter Date: 6/30/2021 Status: Signed    : Eulalia Vance RN (Registered Nurse)       stephy calls, request for ketorolac refill.  Last ov with BE: 5/18/21  Follow up 8-12 weeks  ED encounter: 6/16/21, knee pain    Patient needs to schedule a follow up visit    Requested Prescriptions     Pending Prescriptions Disp Refills   ? ketorolac (TORADOL) 30 mg/mL (1 mL) injection 8 mL 2     Sig: Inject 1 mL (30 mg total) into the shoulder, thigh, or buttocks 3 (three) times a day as needed. No more than eight doses every two weeks     stephy

## 2021-07-06 VITALS — WEIGHT: 307 LBS | HEIGHT: 74 IN | BODY MASS INDEX: 39.4 KG/M2

## 2021-07-14 PROBLEM — F32.A DEPRESSION: Status: RESOLVED | Noted: 2019-03-01 | Resolved: 2021-05-19

## 2021-07-16 ENCOUNTER — OFFICE VISIT (OUTPATIENT)
Dept: PALLIATIVE MEDICINE | Facility: OTHER | Age: 51
End: 2021-07-16
Payer: COMMERCIAL

## 2021-07-16 VITALS
HEIGHT: 74 IN | BODY MASS INDEX: 40.43 KG/M2 | WEIGHT: 315 LBS | DIASTOLIC BLOOD PRESSURE: 102 MMHG | HEART RATE: 69 BPM | SYSTOLIC BLOOD PRESSURE: 140 MMHG

## 2021-07-16 DIAGNOSIS — M51.26 LUMBAR DISC HERNIATION: ICD-10-CM

## 2021-07-16 DIAGNOSIS — S29.011D INTERCOSTAL MUSCLE STRAIN, SUBSEQUENT ENCOUNTER: ICD-10-CM

## 2021-07-16 DIAGNOSIS — G89.4 CHRONIC PAIN SYNDROME: ICD-10-CM

## 2021-07-16 DIAGNOSIS — M17.5 OTHER SECONDARY OSTEOARTHRITIS OF LEFT KNEE: Primary | ICD-10-CM

## 2021-07-16 DIAGNOSIS — M47.816 SPONDYLOSIS OF LUMBAR REGION WITHOUT MYELOPATHY OR RADICULOPATHY: ICD-10-CM

## 2021-07-16 DIAGNOSIS — M54.16 LUMBAR RADICULITIS: ICD-10-CM

## 2021-07-16 PROCEDURE — 99214 OFFICE O/P EST MOD 30 MIN: CPT | Performed by: ANESTHESIOLOGY

## 2021-07-16 PROCEDURE — G0463 HOSPITAL OUTPT CLINIC VISIT: HCPCS

## 2021-07-16 RX ORDER — TRAMADOL HYDROCHLORIDE 50 MG/1
TABLET ORAL
COMMUNITY
Start: 2021-06-22 | End: 2021-07-20

## 2021-07-16 ASSESSMENT — PAIN SCALES - GENERAL: PAINLEVEL: EXTREME PAIN (9)

## 2021-07-16 ASSESSMENT — MIFFLIN-ST. JEOR: SCORE: 2384.87

## 2021-07-16 NOTE — PROGRESS NOTES
Patient is here for a follow up appointment with Dr. Tito Nagel.   Pain score: 9  Constant or intermittent: constant  What does your pain feel like: dull, sharp, throbbing, achy and burning  Does the pain interfere with:   Work: yes  Walking/distance: yes  Sleep: yes  Daily activities: yes  Relationships/social life: yes  Mood: yes  F= 8    Hilda Ruiz LPN

## 2021-07-16 NOTE — PATIENT INSTRUCTIONS
PLAN:    Checkout schedule with Dr. Loepz for evaluation for spinal cord stimulator for back and leg pain.    At checkout schedule with Dr. Lopez for injection your left knee, geniculate nerve block.    Continue the Toradol injection no more than every other day.    Continue the medical cannabis program.    Continue vitamin D supplementation.    Continue the Lyrica 100 mg daily.    Follow-up with Dr. Nagel in 8 weeks

## 2021-07-16 NOTE — LETTER
7/16/2021         RE: Shaun Calvo Jr.  [address]  [facility] [address]        Dear Colleague,    Thank you for referring your patient, Shaun Calvo Jr., to the [facility]. Please see a copy of my visit note below.    Patient is here for a follow up appointment with Dr. Tito Nagel.   Pain score: 9  Constant or intermittent: constant  What does your pain feel like: dull, sharp, throbbing, achy and burning  Does the pain interfere with:   Work: yes  Walking/distance: yes  Sleep: yes  Daily activities: yes  Relationships/social life: yes  Mood: yes  F= 8    ROMAN Betts   Jaime is a 51 year old who presents for the following health issue    Including lumbar foraminal stenosis, mood disorder, alcohol use disorder.    He reviews today 2 weeks ago injuring his knee at work.  It has been a chronic problem though caring some things at work, missed a step.  He describes limping at work for 2 weeks, then going to the emergency room raising concern for meniscal tear.  He did take a week off work.  He is frustrated there were problems with his being considered Worker's Comp.  He has been recommended for total knee replacement as I did not feel can salvage the meniscus, though he cannot take time at work due to financial stressors right now.    He had been [facility] for medial branch block, not gotten scheduled.  Reviews pain continues and is back and is can be down both legs.  Can hard to  things off the floor.  He describes a friend talked about a procedure that sounds is a spinal cord stimulator and he might be interested in.    Also developed right shoulder pain, indicates is deltoid area.  He has been using a cane since his knee injury we discussed this may exacerbated condition.    No bowel or bladder changes noted.    He continues using Toradol almost every other day which does seem to help.  He has not not had side effects.  Reviewed  cautions.    Continues with medical cannabis using high THC which is helping sleep.  Continues with Lyrica 1 mg daily which he thinks is helpful.    Lamotrigine caused memory issues and we did hold on that.  Did not seem to help with PTSD or fibromyalgia.    Reports continue with alcohol 1 sometimes 2 drinks a night.    Vitamin D replacement continues with a level of 7.4.      Current Outpatient Medications:      albuterol (PROAIR HFA;PROVENTIL HFA;VENTOLIN HFA) 90 mcg/actuation inhaler, [ALBUTEROL (PROAIR HFA;PROVENTIL HFA;VENTOLIN HFA) 90 MCG/ACTUATION INHALER] Inhale 2 puffs every 4 (four) hours as needed., Disp: 8.5 g, Rfl: 5     B complex-vitamin C-folic acid 400 mcg tablet, [B COMPLEX-VITAMIN C-FOLIC ACID 400 MCG TABLET] Take 1 tablet by mouth daily., Disp: 30 tablet, Rfl: 5     cholecalciferol, vitamin D3, 1,000 unit (25 mcg) tablet, [CHOLECALCIFEROL, VITAMIN D3, 1,000 UNIT (25 MCG) TABLET] 1 tab daily 1 week, 2 tabs daily 1 weeks, 3 tab daily 1 week, then 4 daily, Disp: 120 tablet, Rfl: 2     cyclobenzaprine (FLEXERIL) 10 MG tablet, [CYCLOBENZAPRINE (FLEXERIL) 10 MG TABLET] TAKE 1 TABLET(10 MG) BY MOUTH THREE TIMES DAILY AS NEEDED FOR MUSCLE SPASMS, Disp: 60 tablet, Rfl: 0     fish oil-omega-3 fatty acids (FISH OIL) 300-1,000 mg capsule, Take 2 g by mouth daily OTC, Disp: , Rfl:      hydroCHLOROthiazide (HYDRODIURIL) 25 MG tablet, [HYDROCHLOROTHIAZIDE (HYDRODIURIL) 25 MG TABLET] Take 1 tablet (25 mg total) by mouth daily., Disp: 90 tablet, Rfl: 3     hydrOXYzine HCL (ATARAX) 25 MG tablet, [HYDROXYZINE HCL (ATARAX) 25 MG TABLET] TAKE 1 TABLET BY MOUTH THREE TIMES DAILY AS NEEDED FOR ANXIETY(PANIC ATTACK), Disp: 60 tablet, Rfl: 3     ketorolac (TORADOL) 30 mg/mL (1 mL) injection, [KETOROLAC (TORADOL) 30 MG/ML (1 ML) INJECTION] Inject 1 mL (30 mg total) into the shoulder, thigh, or buttocks 3 (three) times a day as needed. No more than eight doses every two weeks, Disp: 8 mL, Rfl: 2     lisinopriL  "(PRINIVIL,ZESTRIL) 20 MG tablet, [LISINOPRIL (PRINIVIL,ZESTRIL) 20 MG TABLET] Take 1 tablet (20 mg total) by mouth daily., Disp: 30 tablet, Rfl: 11     medical cannabis (Patient's own supply), See Admin Instructions (The purpose of this order is to document that the patient reports taking medical cannabis.  This is not a prescription, and is not used to certify that the patient has a qualifying medical condition.) Vaping Oil andFlower leaf; 3 puffs inhaled every 3 hours as needed, Disp: , Rfl:      naproxen (NAPROSYN) 500 MG tablet, [NAPROXEN (NAPROSYN) 500 MG TABLET] TAKE 1 TABLET(500 MG) BY MOUTH TWICE DAILY WITH MEALS FOR 14 DAYS, Disp: 28 tablet, Rfl: 0     omeprazole (PRILOSEC) 20 MG capsule, Take 20 mg by mouth every morning OTC, Disp: , Rfl:      pramipexole (MIRAPEX) 0.25 MG tablet, [PRAMIPEXOLE (MIRAPEX) 0.25 MG TABLET] Take 1 tablet (0.25 mg total) by mouth 2 (two) times a day., Disp: 180 tablet, Rfl: 1     traMADol (ULTRAM) 50 MG tablet, TAKE 1 TABLET BY MOUTH EVERY 6 HOURS AS NEEDED FOR SEVERE PAIN, Disp: , Rfl:     He is alert with a clear sensorium, thought process logical.  Affect is full range.    Left knee is swollen slightly, not appear discolored.  Full exam not done.    Pain fellow present for examination evaluated the right shoulder, describes related to mechanics using the cane and discussed change in how the cane is used.    Laboratories reviewed, 5/19 creatinine 1.18, improvement from 0.4 to 5 months ago, liver enzymes were not elevated        HPI       Review of Systems       Objective    BP (!) 140/102   Pulse 69   Ht 1.88 m (6' 2\")   Wt 146.5 kg (323 lb)   BMI 41.47 kg/m    Body mass index is 41.47 kg/m .  Physical Exam     Plan: Discussed the role of a geniculate nerve block to help with knee pain as he feels he needs to continue to work at the present, cannot take time off for surgery.    Reviewed the role of a spinal cord stimulator to be assessed for, as well as his medial branch " blocks and radiofrequency ablation which she has found helpful in the past while in [place].    He will continue the Toradol injections limited to every other day which has allowed him to continue with some degree of function,      Total time more than 35 minutes.      Again, thank you for allowing me to participate in the care of your patient.        Sincerely,        CHITO HARKINS MD

## 2021-07-18 NOTE — PROGRESS NOTES
Subjective   Jaime is a 51 year old who presents for the following health issue    Including lumbar foraminal stenosis, mood disorder, alcohol use disorder.    He reviews today 2 weeks ago injuring his knee at work.  It has been a chronic problem though caring some things at work, missed a step.  He describes limping at work for 2 weeks, then going to the emergency room raising concern for meniscal tear.  He did take a week off work.  He is frustrated there were problems with his being considered Worker's Comp.  He has been recommended for total knee replacement as I did not feel can salvage the meniscus, though he cannot take time at work due to financial stressors right now.    He had been [facility] for medial branch block, not gotten scheduled.  Reviews pain continues and is back and is can be down both legs.  Can hard to  things off the floor.  He describes a friend talked about a procedure that sounds is a spinal cord stimulator and he might be interested in.    Also developed right shoulder pain, indicates is deltoid area.  He has been using a cane since his knee injury we discussed this may exacerbated condition.    No bowel or bladder changes noted.    He continues using Toradol almost every other day which does seem to help.  He has not not had side effects.  Reviewed cautions.    Continues with medical cannabis using high THC which is helping sleep.  Continues with Lyrica 1 mg daily which he thinks is helpful.    Lamotrigine caused memory issues and we did hold on that.  Did not seem to help with PTSD or fibromyalgia.    Reports continue with alcohol 1 sometimes 2 drinks a night.    Vitamin D replacement continues with a level of 7.4.      Current Outpatient Medications:      albuterol (PROAIR HFA;PROVENTIL HFA;VENTOLIN HFA) 90 mcg/actuation inhaler, [ALBUTEROL (PROAIR HFA;PROVENTIL HFA;VENTOLIN HFA) 90 MCG/ACTUATION INHALER] Inhale 2 puffs every 4 (four) hours as needed., Disp: 8.5 g, Rfl: 5     B  complex-vitamin C-folic acid 400 mcg tablet, [B COMPLEX-VITAMIN C-FOLIC ACID 400 MCG TABLET] Take 1 tablet by mouth daily., Disp: 30 tablet, Rfl: 5     cholecalciferol, vitamin D3, 1,000 unit (25 mcg) tablet, [CHOLECALCIFEROL, VITAMIN D3, 1,000 UNIT (25 MCG) TABLET] 1 tab daily 1 week, 2 tabs daily 1 weeks, 3 tab daily 1 week, then 4 daily, Disp: 120 tablet, Rfl: 2     cyclobenzaprine (FLEXERIL) 10 MG tablet, [CYCLOBENZAPRINE (FLEXERIL) 10 MG TABLET] TAKE 1 TABLET(10 MG) BY MOUTH THREE TIMES DAILY AS NEEDED FOR MUSCLE SPASMS, Disp: 60 tablet, Rfl: 0     fish oil-omega-3 fatty acids (FISH OIL) 300-1,000 mg capsule, Take 2 g by mouth daily OTC, Disp: , Rfl:      hydroCHLOROthiazide (HYDRODIURIL) 25 MG tablet, [HYDROCHLOROTHIAZIDE (HYDRODIURIL) 25 MG TABLET] Take 1 tablet (25 mg total) by mouth daily., Disp: 90 tablet, Rfl: 3     hydrOXYzine HCL (ATARAX) 25 MG tablet, [HYDROXYZINE HCL (ATARAX) 25 MG TABLET] TAKE 1 TABLET BY MOUTH THREE TIMES DAILY AS NEEDED FOR ANXIETY(PANIC ATTACK), Disp: 60 tablet, Rfl: 3     ketorolac (TORADOL) 30 mg/mL (1 mL) injection, [KETOROLAC (TORADOL) 30 MG/ML (1 ML) INJECTION] Inject 1 mL (30 mg total) into the shoulder, thigh, or buttocks 3 (three) times a day as needed. No more than eight doses every two weeks, Disp: 8 mL, Rfl: 2     lisinopriL (PRINIVIL,ZESTRIL) 20 MG tablet, [LISINOPRIL (PRINIVIL,ZESTRIL) 20 MG TABLET] Take 1 tablet (20 mg total) by mouth daily., Disp: 30 tablet, Rfl: 11     medical cannabis (Patient's own supply), See Admin Instructions (The purpose of this order is to document that the patient reports taking medical cannabis.  This is not a prescription, and is not used to certify that the patient has a qualifying medical condition.) Vaping Oil andFlower leaf; 3 puffs inhaled every 3 hours as needed, Disp: , Rfl:      naproxen (NAPROSYN) 500 MG tablet, [NAPROXEN (NAPROSYN) 500 MG TABLET] TAKE 1 TABLET(500 MG) BY MOUTH TWICE DAILY WITH MEALS FOR 14 DAYS, Disp: 28  "tablet, Rfl: 0     omeprazole (PRILOSEC) 20 MG capsule, Take 20 mg by mouth every morning OTC, Disp: , Rfl:      pramipexole (MIRAPEX) 0.25 MG tablet, [PRAMIPEXOLE (MIRAPEX) 0.25 MG TABLET] Take 1 tablet (0.25 mg total) by mouth 2 (two) times a day., Disp: 180 tablet, Rfl: 1     traMADol (ULTRAM) 50 MG tablet, TAKE 1 TABLET BY MOUTH EVERY 6 HOURS AS NEEDED FOR SEVERE PAIN, Disp: , Rfl:     He is alert with a clear sensorium, thought process logical.  Affect is full range.    Left knee is swollen slightly, not appear discolored.  Full exam not done.    Pain fellow present for examination evaluated the right shoulder, describes related to mechanics using the cane and discussed change in how the cane is used.    Laboratories reviewed, 5/19 creatinine 1.18, improvement from 0.4 to 5 months ago, liver enzymes were not elevated        HPI       Review of Systems       Objective    BP (!) 140/102   Pulse 69   Ht 1.88 m (6' 2\")   Wt 146.5 kg (323 lb)   BMI 41.47 kg/m    Body mass index is 41.47 kg/m .  Physical Exam     Plan: Discussed the role of a geniculate nerve block to help with knee pain as he feels he needs to continue to work at the present, cannot take time off for surgery.    Reviewed the role of a spinal cord stimulator to be assessed for, as well as his medial branch blocks and radiofrequency ablation which she has found helpful in the past while in [place].    He will continue the Toradol injections limited to every other day which has allowed him to continue with some degree of function,      Total time more than 35 minutes.  "

## 2021-07-20 ENCOUNTER — OFFICE VISIT (OUTPATIENT)
Dept: PALLIATIVE MEDICINE | Facility: OTHER | Age: 51
End: 2021-07-20
Payer: OTHER MISCELLANEOUS

## 2021-07-20 VITALS — SYSTOLIC BLOOD PRESSURE: 176 MMHG | HEART RATE: 64 BPM | DIASTOLIC BLOOD PRESSURE: 76 MMHG

## 2021-07-20 DIAGNOSIS — M54.16 LUMBAR RADICULOPATHY: ICD-10-CM

## 2021-07-20 PROCEDURE — 99213 OFFICE O/P EST LOW 20 MIN: CPT | Performed by: PAIN MEDICINE

## 2021-07-20 PROCEDURE — G0463 HOSPITAL OUTPT CLINIC VISIT: HCPCS

## 2021-07-20 ASSESSMENT — PAIN SCALES - GENERAL: PAINLEVEL: EXTREME PAIN (8)

## 2021-07-20 NOTE — PROGRESS NOTES
Patient here to see Dr Lopez to discuss Spinal Cord Stimulation therapy to treat his low back and bilateral leg pain which he rates as an 8/10.  Also is scheduled to return to clinic on 8/10 for nerve block, left knee.  Information given for both procedures.

## 2021-07-20 NOTE — LETTER
7/20/2021         RE: Shaun Calvo Jr.  [address]  [facility] [address]        Dear Colleague,    Thank you for referring your patient, Shaun Calvo Jr., to the [facility]. Please see a copy of my visit note below.    Patient here to see Dr Lopez to discuss Spinal Cord Stimulation therapy to treat his low back and bilateral leg pain which he rates as an 8/10.  Also is scheduled to return to clinic on 8/10 for nerve block, left knee.  Information given for both procedures.    Spinal Cord Stimulator evaluation note  7/20/2021    Mr. Calvo is a 51 year old man with low back and bilateral leg pain.  He also has left knee pain.  The leg pain is mainly in the anterior thighs down just past the knees.  He has some lateral recess stenosis, foraminal stenosis as well as multilevel degenerative changes on lumbar MRI   He describes some intermittent numb paresthesias in the anterior thighs, especially after a busy day.  He has had lumbar RF that gave him some good relief for about 5-6 months.  That was in 2016.  He did not have that repeated.  His knee pain is his greatest pain today.    He is intersted in a spinal cord stimulator.  He has a friend that has one and highly recommended it.  We discussed that it would not help his knee pain.  It may also not help his axial back pain, as it appears to be mainly facet mediated pain.    The mechanism of spinal cord stimulation was discussed in detail using a spinal model.  The process of the spinal cord stimulator trial was described.  This would be done here in the clinic.  Should the patient have a good result from the trial, we also discussed how we would proceed with a permanent implant that would be done in the operating room.  The patients questions were answered.  Some written information was given.    Impression/Plan:  Mr. Calvo has knee pain, back pain and leg pain.  The knee pain is the worst.  He has significant degeneration in  the knee and is expecting to have a knee replacement in the future.  For now he cannot take time off work.  Steroid shots have not helped.  We will give a try to genicular nerve blocks to see if he would be a good candidate for a radiofrequency ablation for relief.  He also got good relief from his RF in the low back for almost six months.  It has been over five years since that, so we will need to repeat medial branch blocks for that to make sure it is still the same facets that are bothering him.  We will address these two issues and see how much radicular pain remains and then readdress the spinal cord stimulator issue if appropriate at that time.    Schedule genicular nerve blocks for the left knee to see if a RF would be helpful.  Also schedule lumbar MBBs to see if a repeat RF would help relieve the back pain again.      Again, thank you for allowing me to participate in the care of your patient.        Sincerely,        Jamaal RIDLEY MD

## 2021-07-20 NOTE — PROGRESS NOTES
Spinal Cord Stimulator evaluation note  7/20/2021    Mr. Calvo is a 51 year old man with low back and bilateral leg pain.  He also has left knee pain.  The leg pain is mainly in the anterior thighs down just past the knees.  He has some lateral recess stenosis, foraminal stenosis as well as multilevel degenerative changes on lumbar MRI   He describes some intermittent numb paresthesias in the anterior thighs, especially after a busy day.  He has had lumbar RF that gave him some good relief for about 5-6 months.  That was in 2016.  He did not have that repeated.  His knee pain is his greatest pain today.    He is intersted in a spinal cord stimulator.  He has a friend that has one and highly recommended it.  We discussed that it would not help his knee pain.  It may also not help his axial back pain, as it appears to be mainly facet mediated pain.    The mechanism of spinal cord stimulation was discussed in detail using a spinal model.  The process of the spinal cord stimulator trial was described.  This would be done here in the clinic.  Should the patient have a good result from the trial, we also discussed how we would proceed with a permanent implant that would be done in the operating room.  The patients questions were answered.  Some written information was given.    Impression/Plan:  Mr. Calvo has knee pain, back pain and leg pain.  The knee pain is the worst.  He has significant degeneration in the knee and is expecting to have a knee replacement in the future.  For now he cannot take time off work.  Steroid shots have not helped.  We will give a try to genicular nerve blocks to see if he would be a good candidate for a radiofrequency ablation for relief.  He also got good relief from his RF in the low back for almost six months.  It has been over five years since that, so we will need to repeat medial branch blocks for that to make sure it is still the same facets that are bothering him.  We will address  these two issues and see how much radicular pain remains and then readdress the spinal cord stimulator issue if appropriate at that time.    Schedule genicular nerve blocks for the left knee to see if a RF would be helpful.  Also schedule lumbar MBBs to see if a repeat RF would help relieve the back pain again.

## 2021-07-21 RX ORDER — CYCLOBENZAPRINE HCL 10 MG
TABLET ORAL
Qty: 60 TABLET | Refills: 0 | Status: SHIPPED | OUTPATIENT
Start: 2021-07-21 | End: 2021-07-22

## 2021-07-21 RX ORDER — NAPROXEN 500 MG/1
TABLET ORAL
Qty: 28 TABLET | Refills: 0 | Status: SHIPPED | OUTPATIENT
Start: 2021-07-21 | End: 2021-09-20

## 2021-07-21 NOTE — TELEPHONE ENCOUNTER
"Routing refill request to provider for review/approval because:  Labs out of range:  Multiple   Labs not current:  Multiple   No refill policy/protocol info    Last Written Prescription Date:  5/31/21  Last Fill Quantity: 60/28,  # refills: 0   Last office visit provider:  6/22/21     Requested Prescriptions   Pending Prescriptions Disp Refills     cyclobenzaprine (FLEXERIL) 10 MG tablet [Pharmacy Med Name: CYCLOBENZAPRINE 10MG TABLETS] 60 tablet 0     Sig: TAKE 1 TABLET(10 MG) BY MOUTH THREE TIMES DAILY AS NEEDED FOR MUSCLE SPASMS       There is no refill protocol information for this order        naproxen (NAPROSYN) 500 MG tablet [Pharmacy Med Name: NAPROXEN 500MG TABLETS] 28 tablet 0     Sig: TAKE 1 TABLET(500 MG) BY MOUTH TWICE DAILY WITH MEALS AS NEEDED FOR PAIN       NSAID Medications Failed - 7/16/2021  8:17 AM        Failed - Blood pressure under 140/90 in past 12 months     BP Readings from Last 3 Encounters:   07/20/21 (!) 176/76   07/16/21 (!) 140/102   06/17/21 (!) 160/100                 Failed - Normal ALT on file in past 12 months     Recent Labs   Lab Test 05/19/21  1747   ALT 46*             Failed - Normal AST on file in past 12 months     Recent Labs   Lab Test 05/19/21  1747   AST 51*             Failed - Normal CBC on file in past 12 months     Recent Labs   Lab Test 02/01/21  1929   WBC 6.2   RBC 4.65   HGB 13.7*   HCT 40.8                    Passed - Recent (12 mo) or future (30 days) visit within the authorizing provider's specialty     Patient has had an office visit with the authorizing provider or a provider within the authorizing providers department within the previous 12 mos or has a future within next 30 days. See \"Patient Info\" tab in inbasket, or \"Choose Columns\" in Meds & Orders section of the refill encounter.              Passed - Patient is age 6-64 years        Passed - Medication is active on med list        Passed - Normal serum creatinine on file in past 12 months     " Recent Labs   Lab Test 05/19/21  1747   CR 1.18       Ok to refill medication if creatinine is low               Delmer Berger RN 07/21/21 10:56 AM

## 2021-07-22 ENCOUNTER — PATIENT OUTREACH (OUTPATIENT)
Dept: CARE COORDINATION | Facility: CLINIC | Age: 51
End: 2021-07-22

## 2021-07-22 DIAGNOSIS — M54.16 LUMBAR RADICULITIS: ICD-10-CM

## 2021-07-22 DIAGNOSIS — M47.816 SPONDYLOSIS OF LUMBAR REGION WITHOUT MYELOPATHY OR RADICULOPATHY: ICD-10-CM

## 2021-07-22 DIAGNOSIS — M51.26 LUMBAR DISC HERNIATION: ICD-10-CM

## 2021-07-22 RX ORDER — CYCLOBENZAPRINE HCL 10 MG
TABLET ORAL
Qty: 60 TABLET | Refills: 0 | Status: SHIPPED | OUTPATIENT
Start: 2021-07-22 | End: 2021-09-20

## 2021-07-22 NOTE — PROGRESS NOTES
[facility]/Voicemail       Clinical Data: Care Coordinator Outreach  Outreach attempted x 1.  Left message on patient's voicemail with call back information and requested return call.  Plan:  Care Coordinator will try to reach patient again in 3-5 business days.  Next outreach due: 7/28/21

## 2021-08-05 ENCOUNTER — PATIENT OUTREACH (OUTPATIENT)
Dept: CARE COORDINATION | Facility: CLINIC | Age: 51
End: 2021-08-05

## 2021-08-05 NOTE — PROGRESS NOTES
Contact  [facility]/Voicemail    Referral Source: Care Team  Clinical Data:  Outreach  Outreach attempted x 2.  Left message on voicemail with call back information and requested return call.  Plan: Delegating to CHW to send unable to reach letter if no call back in 10 business days.     FERNANDO CC available as needed and in 6 weeks.  Angeli Mcelroy,   [facility]  [phone]

## 2021-08-11 ENCOUNTER — PATIENT OUTREACH (OUTPATIENT)
Dept: CARE COORDINATION | Facility: CLINIC | Age: 51
End: 2021-08-11

## 2021-08-11 NOTE — PROGRESS NOTES
[facility] SW attempted outreach on 8/5/21 and was unsuccessful.   Delegating to CHW to send unable to reach letter if no call back in 10 days.     Next outreach due: 8/19/21

## 2021-08-27 ENCOUNTER — PATIENT OUTREACH (OUTPATIENT)
Dept: CARE COORDINATION | Facility: CLINIC | Age: 51
End: 2021-08-27

## 2021-08-27 ENCOUNTER — OFFICE VISIT (OUTPATIENT)
Dept: PALLIATIVE MEDICINE | Facility: OTHER | Age: 51
End: 2021-08-27
Payer: COMMERCIAL

## 2021-08-27 VITALS
RESPIRATION RATE: 16 BRPM | HEIGHT: 74 IN | DIASTOLIC BLOOD PRESSURE: 88 MMHG | WEIGHT: 315 LBS | BODY MASS INDEX: 40.43 KG/M2 | SYSTOLIC BLOOD PRESSURE: 164 MMHG | HEART RATE: 74 BPM

## 2021-08-27 DIAGNOSIS — M54.50 CHRONIC BILATERAL LOW BACK PAIN WITHOUT SCIATICA: Primary | ICD-10-CM

## 2021-08-27 DIAGNOSIS — G89.29 CHRONIC BILATERAL LOW BACK PAIN WITHOUT SCIATICA: Primary | ICD-10-CM

## 2021-08-27 DIAGNOSIS — G89.4 CHRONIC PAIN SYNDROME: ICD-10-CM

## 2021-08-27 PROCEDURE — G0463 HOSPITAL OUTPT CLINIC VISIT: HCPCS

## 2021-08-27 PROCEDURE — 99214 OFFICE O/P EST MOD 30 MIN: CPT | Performed by: ANESTHESIOLOGY

## 2021-08-27 RX ORDER — KETAMINE HCL 100 %
POWDER (GRAM) MISCELLANEOUS
Qty: 30 G | Refills: 1 | Status: SHIPPED | OUTPATIENT
Start: 2021-08-27 | End: 2021-09-21

## 2021-08-27 RX ORDER — PREGABALIN 100 MG/1
1 CAPSULE ORAL DAILY
COMMUNITY
Start: 2021-08-02 | End: 2021-10-22

## 2021-08-27 ASSESSMENT — PAIN SCALES - GENERAL: PAINLEVEL: EXTREME PAIN (8)

## 2021-08-27 ASSESSMENT — MIFFLIN-ST. JEOR: SCORE: 2371.26

## 2021-08-27 NOTE — PROGRESS NOTES
Patient is here for a follow up appointment with Dr. Tito Nagel.   Pain score: 8 Left knee and lower back  Constant or intermittent: constant  What does your pain feel like: dull, sharp, throbbing, achy and burning  Does the pain interfere with:   Work: yes  Walking/distance: yes  Sleep: yes  Daily activities: yes  Relationships/social life: yes  Mood: yes  F= 8      UDT = due   CSA = n/a

## 2021-08-27 NOTE — LETTER
"    8/27/2021         RE: Shaun Calvo Jr.  [address]  [facility] [address]        Dear Colleague,    Thank you for referring your patient, Shaun Calvo Jr., to the [facility]. Please see a copy of my visit note below.    Patient is here for a follow up appointment with Dr. Tito Nagel.   Pain score: 8 Left knee and lower back  Constant or intermittent: constant  What does your pain feel like: dull, sharp, throbbing, achy and burning  Does the pain interfere with:   Work: yes  Walking/distance: yes  Sleep: yes  Daily activities: yes  Relationships/social life: yes  Mood: yes  F= 8      UDT = due   CSA = n/a             Subjective   Jaime is a 51 year old who presents for the following health issues     Including lumbar degenerative changes, osteoarthritis of the knees.    HPI         He did meet with Dr. Lopez for discussion of spinal cord stimulator, geniculate nerve block.  [place] that the genicular nerve block is not covered by insurance.  The spinal cord stimulator may be more of a challenge.  He has had positive response to radiofrequency lesions in the past when up [place].  He would be interested in pursuing that again.    Tinges with significant knee pain.  He had gotten steroid injections with [facility], is allowed to get them every 4 months and may be due to have that again.    He continues to be working, cannot afford to take time off for the knee replacement has been discussed.    Continues in the middle of the friction process which is very stressful.    Continues using the Toradol injection every other day.  It does seem to help some allows him to keep functioning.  He is trying to postpone to the third day but is \"crawling\".    Continues with medical cannabis, the high THC vaporizer which is helpful.  Cost is a concern.    I reviewed the option of ketamine.  Reviewed other opioids would not be an option with his alcohol use.  Acknowledges still 2 or 3 " "drinks of alcohol each night.  He notes in the past he would not have been interested in opioids concern on long-term concerns but now in a sense is \"begging\" for anything to help.  We reviewed the off label use of ketamine.  Reviewed possible side effects.  Discussed concern still with alcohol use and he will try to minimize that.    Acknowledges depression and PTSD are \"horrible\" with his present stressors.      Current Outpatient Medications:      albuterol (PROAIR HFA;PROVENTIL HFA;VENTOLIN HFA) 90 mcg/actuation inhaler, [ALBUTEROL (PROAIR HFA;PROVENTIL HFA;VENTOLIN HFA) 90 MCG/ACTUATION INHALER] Inhale 2 puffs every 4 (four) hours as needed., Disp: 8.5 g, Rfl: 5     B complex-vitamin C-folic acid 400 mcg tablet, [B COMPLEX-VITAMIN C-FOLIC ACID 400 MCG TABLET] Take 1 tablet by mouth daily., Disp: 30 tablet, Rfl: 5     cholecalciferol, vitamin D3, 1,000 unit (25 mcg) tablet, [CHOLECALCIFEROL, VITAMIN D3, 1,000 UNIT (25 MCG) TABLET] 1 tab daily 1 week, 2 tabs daily 1 weeks, 3 tab daily 1 week, then 4 daily, Disp: 120 tablet, Rfl: 2     cyclobenzaprine (FLEXERIL) 10 MG tablet, TAKE 1 TABLET(10 MG) BY MOUTH THREE TIMES DAILY AS NEEDED FOR MUSCLE SPASMS, Disp: 60 tablet, Rfl: 0     fish oil-omega-3 fatty acids (FISH OIL) 300-1,000 mg capsule, Take 2 g by mouth daily OTC, Disp: , Rfl:      hydroCHLOROthiazide (HYDRODIURIL) 25 MG tablet, [HYDROCHLOROTHIAZIDE (HYDRODIURIL) 25 MG TABLET] Take 1 tablet (25 mg total) by mouth daily., Disp: 90 tablet, Rfl: 3     hydrOXYzine HCL (ATARAX) 25 MG tablet, [HYDROXYZINE HCL (ATARAX) 25 MG TABLET] TAKE 1 TABLET BY MOUTH THREE TIMES DAILY AS NEEDED FOR ANXIETY(PANIC ATTACK), Disp: 60 tablet, Rfl: 3     ketamine HCl POWD, Ketamine 20 mg one-half under tongue 4 times a day 5 days, one under tongue 4 times a day 5 days, Disp: 30 g, Rfl: 1     ketorolac (TORADOL) 30 MG/ML injection, Inject 1 mL (30 mg) into the muscle 3 times daily as needed for moderate pain, Disp: 8 mL, Rfl: 2     " "lisinopriL (PRINIVIL,ZESTRIL) 20 MG tablet, [LISINOPRIL (PRINIVIL,ZESTRIL) 20 MG TABLET] Take 1 tablet (20 mg total) by mouth daily., Disp: 30 tablet, Rfl: 11     medical cannabis (Patient's own supply), See Admin Instructions (The purpose of this order is to document that the patient reports taking medical cannabis.  This is not a prescription, and is not used to certify that the patient has a qualifying medical condition.) Vaping Oil andFlower leaf; 3 puffs inhaled every 3 hours as needed, Disp: , Rfl:      naproxen (NAPROSYN) 500 MG tablet, TAKE 1 TABLET(500 MG) BY MOUTH TWICE DAILY WITH MEALS AS NEEDED FOR PAIN, Disp: 28 tablet, Rfl: 0     omeprazole (PRILOSEC) 20 MG capsule, Take 20 mg by mouth every morning OTC, Disp: , Rfl:      pramipexole (MIRAPEX) 0.25 MG tablet, [PRAMIPEXOLE (MIRAPEX) 0.25 MG TABLET] Take 1 tablet (0.25 mg total) by mouth 2 (two) times a day., Disp: 180 tablet, Rfl: 1     pregabalin (LYRICA) 100 MG capsule, Take 1 capsule by mouth daily, Disp: , Rfl:     Doing laboratories his last CMP was 5/19, normal renal function, AST and ALT mildly elevated   On interview alert, clear sensorium good eye contact.  Thought process logical.  Affect is congruent.  No respiratory distress.  Ambulates with cane.  Left knee without erythema or swelling.    Review of Systems         Objective    BP (!) 164/88   Pulse 74   Resp 16   Ht 1.88 m (6' 2\")   Wt 145.2 kg (320 lb)   BMI 41.09 kg/m    Body mass index is 41.09 kg/m .  Physical Exam       Plan: At checkout will schedule with medial branch block for Dr. Lopez.    He will call [facility] and see when he can reschedule another steroid injection.    Reviewed dosing, side effects with ketamine, will start with 10 mg 4 times a day advance slowly as tolerated.    We will extend the interval Toradol injections as able.    Total time more than 30              Again, thank you for allowing me to participate in the care of your patient.  "       Sincerely,        CHITO HARKINS MD

## 2021-08-27 NOTE — PROGRESS NOTES
"        Tera Sandoval is a 51 year old who presents for the following health issues     Including lumbar degenerative changes, osteoarthritis of the knees.    HPI         He did meet with Dr. Lopez for discussion of spinal cord stimulator, geniculate nerve block.  [place] that the genicular nerve block is not covered by insurance.  The spinal cord stimulator may be more of a challenge.  [place]  [place] be interested in pursuing that again.    Tinges with significant knee pain.  He had gotten steroid injections with [facility], is allowed to get them every 4 months and may be due to have that again.    He continues to be working, cannot afford to take time off for the knee replacement has been discussed.    Continues in the middle of the friction process which is very stressful.    Continues using the Toradol injection every other day.  It does seem to help some allows him to keep functioning.  He is trying to postpone to the third day but is \"crawling\".    Continues with medical cannabis, the high THC vaporizer which is helpful.  Cost is a concern.    I reviewed the option of ketamine.  Reviewed other opioids would not be an option with his alcohol use.  Acknowledges still 2 or 3 drinks of alcohol each night.  He notes in the past he would not have been interested in opioids concern on long-term concerns but now in a sense is \"begging\" for anything to help.  We reviewed the off label use of ketamine.  Reviewed possible side effects.  Discussed concern still with alcohol use and he will try to minimize that.    Acknowledges depression and PTSD are \"horrible\" with his present stressors.      Current Outpatient Medications:      albuterol (PROAIR HFA;PROVENTIL HFA;VENTOLIN HFA) 90 mcg/actuation inhaler, [ALBUTEROL (PROAIR HFA;PROVENTIL HFA;VENTOLIN HFA) 90 MCG/ACTUATION INHALER] Inhale 2 puffs every 4 (four) hours as needed., " Disp: 8.5 g, Rfl: 5     B complex-vitamin C-folic acid 400 mcg tablet, [B COMPLEX-VITAMIN C-FOLIC ACID 400 MCG TABLET] Take 1 tablet by mouth daily., Disp: 30 tablet, Rfl: 5     cholecalciferol, vitamin D3, 1,000 unit (25 mcg) tablet, [CHOLECALCIFEROL, VITAMIN D3, 1,000 UNIT (25 MCG) TABLET] 1 tab daily 1 week, 2 tabs daily 1 weeks, 3 tab daily 1 week, then 4 daily, Disp: 120 tablet, Rfl: 2     cyclobenzaprine (FLEXERIL) 10 MG tablet, TAKE 1 TABLET(10 MG) BY MOUTH THREE TIMES DAILY AS NEEDED FOR MUSCLE SPASMS, Disp: 60 tablet, Rfl: 0     fish oil-omega-3 fatty acids (FISH OIL) 300-1,000 mg capsule, Take 2 g by mouth daily OTC, Disp: , Rfl:      hydroCHLOROthiazide (HYDRODIURIL) 25 MG tablet, [HYDROCHLOROTHIAZIDE (HYDRODIURIL) 25 MG TABLET] Take 1 tablet (25 mg total) by mouth daily., Disp: 90 tablet, Rfl: 3     hydrOXYzine HCL (ATARAX) 25 MG tablet, [HYDROXYZINE HCL (ATARAX) 25 MG TABLET] TAKE 1 TABLET BY MOUTH THREE TIMES DAILY AS NEEDED FOR ANXIETY(PANIC ATTACK), Disp: 60 tablet, Rfl: 3     ketamine HCl POWD, Ketamine 20 mg one-half under tongue 4 times a day 5 days, one under tongue 4 times a day 5 days, Disp: 30 g, Rfl: 1     ketorolac (TORADOL) 30 MG/ML injection, Inject 1 mL (30 mg) into the muscle 3 times daily as needed for moderate pain, Disp: 8 mL, Rfl: 2     lisinopriL (PRINIVIL,ZESTRIL) 20 MG tablet, [LISINOPRIL (PRINIVIL,ZESTRIL) 20 MG TABLET] Take 1 tablet (20 mg total) by mouth daily., Disp: 30 tablet, Rfl: 11     medical cannabis (Patient's own supply), See Admin Instructions (The purpose of this order is to document that the patient reports taking medical cannabis.  This is not a prescription, and is not used to certify that the patient has a qualifying medical condition.) Vaping Oil andFlower leaf; 3 puffs inhaled every 3 hours as needed, Disp: , Rfl:      naproxen (NAPROSYN) 500 MG tablet, TAKE 1 TABLET(500 MG) BY MOUTH TWICE DAILY WITH MEALS AS NEEDED FOR PAIN, Disp: 28 tablet, Rfl: 0      "omeprazole (PRILOSEC) 20 MG capsule, Take 20 mg by mouth every morning OTC, Disp: , Rfl:      pramipexole (MIRAPEX) 0.25 MG tablet, [PRAMIPEXOLE (MIRAPEX) 0.25 MG TABLET] Take 1 tablet (0.25 mg total) by mouth 2 (two) times a day., Disp: 180 tablet, Rfl: 1     pregabalin (LYRICA) 100 MG capsule, Take 1 capsule by mouth daily, Disp: , Rfl:     Doing laboratories his last CMP was 5/19, normal renal function, AST and ALT mildly elevated   On interview alert, clear sensorium good eye contact.  Thought process logical.  Affect is congruent.  No respiratory distress.  Ambulates with cane.  Left knee without erythema or swelling.    Review of Systems         Objective    BP (!) 164/88   Pulse 74   Resp 16   Ht 1.88 m (6' 2\")   Wt 145.2 kg (320 lb)   BMI 41.09 kg/m    Body mass index is 41.09 kg/m .  Physical Exam       Plan: At checkout will schedule with medial branch block for Dr. Lopez.    He will call [facility] and see when he can reschedule another steroid injection.    Reviewed dosing, side effects with ketamine, will start with 10 mg 4 times a day advance slowly as tolerated.    We will extend the interval Toradol injections as able.    Total time more than 30          "

## 2021-08-27 NOTE — PROGRESS NOTES
[facility]/Voicemail       Clinical Data: Care Coordinator Outreach  Outreach attempted x 3.  Left message on patient's voicemail with call back information and requested return call.  Plan: Will send chart review request to care coordination lead to disenroll  Care Coordinator will do no further outreaches at this time.

## 2021-08-27 NOTE — PATIENT INSTRUCTIONS
PLAN:     You  will call [facility] to schedule a steroid injection for your knee.    Checkout schedule with Dr. Lopez for medial branch block for your back.    Begin ketamine to help with knee pain, depression and PTSD.  20 mg lozenge, 1/2 lozenge 4 times a day for 5 days, then 1 lozenge 4 times a day, call after 1 week to adjust dosing.    Continue with Toradol injection no more frequent than every other day, extend as able.    Follow-up with Dr. Nagel in 6 weeks

## 2021-08-30 ENCOUNTER — PATIENT OUTREACH (OUTPATIENT)
Dept: CARE COORDINATION | Facility: CLINIC | Age: 51
End: 2021-08-30

## 2021-08-30 NOTE — LETTER
[facility] CARE COORDINATION  [facility]    August 30, 2021    Shaun Calvo Jr.  [address]  [facility] [address]      Dear Shaun,    I have been unsuccessful in reaching you since our last contact. At this time the Care Coordination team will make no further attempts to reach you, however this does not change your ability to continue receiving care from your providers at your primary care clinic. If you need additional support from a care coordinator in the future please contact [facility] at [phone].    All of us at [facility] are invested in your health and are here to assist you in meeting your goals.     Sincerely,    Angeli Mcelroy,   [facility]  [phone]

## 2021-08-30 NOTE — PROGRESS NOTES
Contact   Chart Review     Situation: Patient chart reviewed by .    Background: enrolled in care coordination.    Assessment: Has not returned calls, unreachable for two months.     Plan/Recommendations: Disenrolling patient. If he contacts team, will consider re enrolling.  Letter sent via My Chart.     Angeli Mcelroy,   [facility]  [phone]

## 2021-08-31 ENCOUNTER — TELEPHONE (OUTPATIENT)
Dept: PALLIATIVE MEDICINE | Facility: OTHER | Age: 51
End: 2021-08-31

## 2021-08-31 NOTE — TELEPHONE ENCOUNTER
Patricia from [facility] leaves a message requesting the AMN form be faxed for patient.

## 2021-09-14 ENCOUNTER — TELEPHONE (OUTPATIENT)
Dept: PALLIATIVE MEDICINE | Facility: OTHER | Age: 51
End: 2021-09-14

## 2021-09-14 ENCOUNTER — OFFICE VISIT (OUTPATIENT)
Dept: PALLIATIVE MEDICINE | Facility: OTHER | Age: 51
End: 2021-09-14
Payer: COMMERCIAL

## 2021-09-14 VITALS
RESPIRATION RATE: 18 BRPM | SYSTOLIC BLOOD PRESSURE: 199 MMHG | BODY MASS INDEX: 40.43 KG/M2 | WEIGHT: 315 LBS | HEIGHT: 74 IN | TEMPERATURE: 98 F | DIASTOLIC BLOOD PRESSURE: 114 MMHG | HEART RATE: 100 BPM

## 2021-09-14 DIAGNOSIS — G89.29 CHRONIC BILATERAL LOW BACK PAIN WITHOUT SCIATICA: Primary | ICD-10-CM

## 2021-09-14 DIAGNOSIS — M54.50 CHRONIC BILATERAL LOW BACK PAIN WITHOUT SCIATICA: Primary | ICD-10-CM

## 2021-09-14 DIAGNOSIS — G89.4 CHRONIC PAIN SYNDROME: ICD-10-CM

## 2021-09-14 PROCEDURE — G0463 HOSPITAL OUTPT CLINIC VISIT: HCPCS

## 2021-09-14 PROCEDURE — 99213 OFFICE O/P EST LOW 20 MIN: CPT | Performed by: PAIN MEDICINE

## 2021-09-14 ASSESSMENT — MIFFLIN-ST. JEOR: SCORE: 2371.26

## 2021-09-14 ASSESSMENT — PAIN SCALES - GENERAL: PAINLEVEL: WORST PAIN (10)

## 2021-09-14 NOTE — TELEPHONE ENCOUNTER
PA was  denied for initial bilateral LMBB.  Consulted Dr Lopez regarding information and physical exam findings insurance is requesting. Dr Lopez would like pt to schedule a consult with him. Nurse asked schedulers to call pt to schedule.

## 2021-09-14 NOTE — LETTER
2021         RE: Shaun Calvo Jr.  [address]  [facility] [address]        Dear Colleague,    Thank you for referring your patient, Shaun Calvo Jr., to the [facility]. Please see a copy of my visit note below.    Pt is scheduled for consult with Dr Lopez regarding his lower back pain.    Pain Center Progress Note    ENCOUNTER DATE: 2021    Shaun Calvo Jr.   1970  MRN # [id number]  PCP: Harriet Vo    IMPRESSION / PLAN:  Shaun Calvo Jr. is a 51 year old male with axial low back pain.  He did have a lumbar radiofrequency ablation in 2016.  That gave him about 5-6 months of pain relief.  He did not have that repeated.  He has been mainly having pain with his knees.  Recently, however, he has been having increased axial low back pain.  His examination shows increased pain with maneuvers that put stress on the facet joints.  He has moderate to severe facet arthropathy seen on his MRI scan of the lumbar spine 3/23/2019.  Since it has been about five years since his previous RF, we will do medial branch block to verify the facet joints are still the pain generators.    Past Medical History:   Diagnosis Date     Anxiety      Asthma      Atrial fibrillation (H)      Depression      Hypertension      No past surgical history on file.    Allergies   Allergen Reactions     Penicillins Anaphylaxis     Current Outpatient Medications   Medication Sig Dispense Refill     albuterol (PROAIR HFA;PROVENTIL HFA;VENTOLIN HFA) 90 mcg/actuation inhaler [ALBUTEROL (PROAIR HFA;PROVENTIL HFA;VENTOLIN HFA) 90 MCG/ACTUATION INHALER] Inhale 2 puffs every 4 (four) hours as needed. 8.5 g 5     B complex-vitamin C-folic acid 400 mcg tablet [B COMPLEX-VITAMIN C-FOLIC ACID 400 MCG TABLET] Take 1 tablet by mouth daily. 30 tablet 5     cholecalciferol, vitamin D3, 1,000 unit (25 mcg) tablet [CHOLECALCIFEROL, VITAMIN D3, 1,000 UNIT (25 MCG) TABLET] 1 tab daily 1 week, 2  tabs daily 1 weeks, 3 tab daily 1 week, then 4 daily 120 tablet 2     cyclobenzaprine (FLEXERIL) 10 MG tablet TAKE 1 TABLET(10 MG) BY MOUTH THREE TIMES DAILY AS NEEDED FOR MUSCLE SPASMS 60 tablet 0     hydroCHLOROthiazide (HYDRODIURIL) 25 MG tablet [HYDROCHLOROTHIAZIDE (HYDRODIURIL) 25 MG TABLET] Take 1 tablet (25 mg total) by mouth daily. 90 tablet 3     hydrOXYzine HCL (ATARAX) 25 MG tablet [HYDROXYZINE HCL (ATARAX) 25 MG TABLET] TAKE 1 TABLET BY MOUTH THREE TIMES DAILY AS NEEDED FOR ANXIETY(PANIC ATTACK) 60 tablet 3     ketamine HCl POWD Ketamine 20 mg one-half under tongue 4 times a day 5 days, one under tongue 4 times a day 5 days 30 g 1     ketorolac (TORADOL) 30 MG/ML injection Inject 1 mL (30 mg) into the muscle 3 times daily as needed for moderate pain 8 mL 2     lisinopriL (PRINIVIL,ZESTRIL) 20 MG tablet [LISINOPRIL (PRINIVIL,ZESTRIL) 20 MG TABLET] Take 1 tablet (20 mg total) by mouth daily. 30 tablet 11     medical cannabis (Patient's own supply) See Admin Instructions (The purpose of this order is to document that the patient reports taking medical cannabis.  This is not a prescription, and is not used to certify that the patient has a qualifying medical condition.)  Vaping Oil andFlower leaf; 3 puffs inhaled every 3 hours as needed       naproxen (NAPROSYN) 500 MG tablet TAKE 1 TABLET(500 MG) BY MOUTH TWICE DAILY WITH MEALS AS NEEDED FOR PAIN 28 tablet 0     omeprazole (PRILOSEC) 20 MG capsule Take 20 mg by mouth every morning OTC       pramipexole (MIRAPEX) 0.25 MG tablet [PRAMIPEXOLE (MIRAPEX) 0.25 MG TABLET] Take 1 tablet (0.25 mg total) by mouth 2 (two) times a day. 180 tablet 1     pregabalin (LYRICA) 100 MG capsule Take 1 capsule by mouth daily       fish oil-omega-3 fatty acids (FISH OIL) 300-1,000 mg capsule Take 2 g by mouth daily OTC (Patient not taking: Reported on 9/14/2021)       IMAGING:  EXAM: MR LUMBAR SPINE WO CONTRAST  LOCATION: [facility]  DATE/TIME: 3/23/2019 10:50  AM  INDICATION: Acute on chronic low back pain  COMPARISON: None.  TECHNIQUE: Without IV contrast  FINDINGS:   Nomenclature is based on 5 lumbar type vertebral bodies. Normal vertebral body heights and marrow signal. 3 mm grade 1 anterolisthesis of L5 on S1 There is a chronic Schmorl's node along the anterior superior endplate of S1. The conus tip is identified at L1. No extraspinal abnormality. The visualized portions of the bony pelvis are normal for age.  T12-L1: Normal disc height and signal. No herniation. Mild facet arthropathy. No spinal canal stenosis. No right neural foraminal stenosis. No left neural foraminal stenosis.  L1-L2: Normal disc height and signal. No herniation. Mild facet arthropathy. No spinal canal stenosis. No right neural foraminal stenosis. No left neural foraminal stenosis.  L2-L3: Normal disc height and signal. No herniation. Mild to moderate facet arthropathy. No spinal canal stenosis. No right neural foraminal stenosis. No left neural foraminal stenosis.  L3-L4: Moderate loss of disc height and signal. Broad-based circumferential disc osteophyte complex. Moderate facet arthropathy. Mild spinal canal stenosis with mild narrowing of the lateral recesses. Mild right neural foraminal stenosis. Moderate left neural foraminal stenosis.  L4-L5: Mild loss of disc height and signal. Broad-based circumferential disc osteophyte complex with superimposed, partially desiccated left foraminal disc protrusion. Moderate to severe right and moderate left facet arthropathy. No central spinal canal stenosis, though there is mild narrowing of the lateral recesses. Mild to moderate right neural foraminal stenosis. Moderate left neural foraminal stenosis.  L5-S1: Anterolisthesis with moderate loss of disc height and signal. Small circumferential disc bulge. Moderate bilateral facet arthropathy, right greater than left. No spinal canal stenosis. Mild to moderate right neural foraminal stenosis. No left    neural foraminal stenosis.    CONCLUSION:  1.  Multilevel degenerative changes of the lumbar spine as described in detail above, with mild spinal canal stenosis and narrowing of the lateral recesses at L3-4, where there is mild right and moderate left neural foraminal stenosis.  2.  At L4-5, there is mild narrowing of the lateral recesses with mild to moderate right and moderate left neural foraminal stenosis.  3.  At L5-S1, there is mild to moderate right neural foraminal stenosis.    PHYSICAL EXAM:  The patient is able to stand from a sitting position without difficulty.  He has no weakness or numbness in the lower extremities.  Straight leg raising is negative.  There is diffuse tenderness in the lower lumbar area bilaterally.  There is increased pain in the axial lumbar area with rotation, extension and lateral bending.  His gait is normal.    Jamaal Lopez MD  [facility]        Again, thank you for allowing me to participate in the care of your patient.        Sincerely,        Jamaal RIDLEY MD

## 2021-09-15 ENCOUNTER — DOCUMENTATION ONLY (OUTPATIENT)
Dept: PALLIATIVE MEDICINE | Facility: OTHER | Age: 51
End: 2021-09-15

## 2021-09-15 DIAGNOSIS — G89.29 CHRONIC BILATERAL LOW BACK PAIN WITHOUT SCIATICA: ICD-10-CM

## 2021-09-15 DIAGNOSIS — M54.50 CHRONIC BILATERAL LOW BACK PAIN WITHOUT SCIATICA: ICD-10-CM

## 2021-09-15 RX ORDER — KETAMINE HCL 100 %
POWDER (GRAM) MISCELLANEOUS
Qty: 60 G | Refills: 1 | OUTPATIENT
Start: 2021-09-15

## 2021-09-15 NOTE — PROGRESS NOTES
Pain Center Progress Note    ENCOUNTER DATE: 2021    Shaun Calvo Jr.   1970  MRN # [id number]  PCP: Harriet Vo    IMPRESSION / PLAN:  Shaun Calvo Jr. is a 51 year old male with axial low back pain.  He did have a lumbar radiofrequency ablation in 2016.  That gave him about 5-6 months of pain relief.  He did not have that repeated.  He has been mainly having pain with his knees.  Recently, however, he has been having increased axial low back pain.  His examination shows increased pain with maneuvers that put stress on the facet joints.  He has moderate to severe facet arthropathy seen on his MRI scan of the lumbar spine 3/23/2019.  Since it has been about five years since his previous RF, we will do medial branch block to verify the facet joints are still the pain generators.    Past Medical History:   Diagnosis Date     Anxiety      Asthma      Atrial fibrillation (H)      Depression      Hypertension      No past surgical history on file.    Allergies   Allergen Reactions     Penicillins Anaphylaxis     Current Outpatient Medications   Medication Sig Dispense Refill     albuterol (PROAIR HFA;PROVENTIL HFA;VENTOLIN HFA) 90 mcg/actuation inhaler [ALBUTEROL (PROAIR HFA;PROVENTIL HFA;VENTOLIN HFA) 90 MCG/ACTUATION INHALER] Inhale 2 puffs every 4 (four) hours as needed. 8.5 g 5     B complex-vitamin C-folic acid 400 mcg tablet [B COMPLEX-VITAMIN C-FOLIC ACID 400 MCG TABLET] Take 1 tablet by mouth daily. 30 tablet 5     cholecalciferol, vitamin D3, 1,000 unit (25 mcg) tablet [CHOLECALCIFEROL, VITAMIN D3, 1,000 UNIT (25 MCG) TABLET] 1 tab daily 1 week, 2 tabs daily 1 weeks, 3 tab daily 1 week, then 4 daily 120 tablet 2     cyclobenzaprine (FLEXERIL) 10 MG tablet TAKE 1 TABLET(10 MG) BY MOUTH THREE TIMES DAILY AS NEEDED FOR MUSCLE SPASMS 60 tablet 0     hydroCHLOROthiazide (HYDRODIURIL) 25 MG tablet [HYDROCHLOROTHIAZIDE (HYDRODIURIL) 25 MG TABLET] Take 1 tablet (25 mg total) by mouth  daily. 90 tablet 3     hydrOXYzine HCL (ATARAX) 25 MG tablet [HYDROXYZINE HCL (ATARAX) 25 MG TABLET] TAKE 1 TABLET BY MOUTH THREE TIMES DAILY AS NEEDED FOR ANXIETY(PANIC ATTACK) 60 tablet 3     ketamine HCl POWD Ketamine 20 mg one-half under tongue 4 times a day 5 days, one under tongue 4 times a day 5 days 30 g 1     ketorolac (TORADOL) 30 MG/ML injection Inject 1 mL (30 mg) into the muscle 3 times daily as needed for moderate pain 8 mL 2     lisinopriL (PRINIVIL,ZESTRIL) 20 MG tablet [LISINOPRIL (PRINIVIL,ZESTRIL) 20 MG TABLET] Take 1 tablet (20 mg total) by mouth daily. 30 tablet 11     medical cannabis (Patient's own supply) See Admin Instructions (The purpose of this order is to document that the patient reports taking medical cannabis.  This is not a prescription, and is not used to certify that the patient has a qualifying medical condition.)  Vaping Oil andFlower leaf; 3 puffs inhaled every 3 hours as needed       naproxen (NAPROSYN) 500 MG tablet TAKE 1 TABLET(500 MG) BY MOUTH TWICE DAILY WITH MEALS AS NEEDED FOR PAIN 28 tablet 0     omeprazole (PRILOSEC) 20 MG capsule Take 20 mg by mouth every morning OTC       pramipexole (MIRAPEX) 0.25 MG tablet [PRAMIPEXOLE (MIRAPEX) 0.25 MG TABLET] Take 1 tablet (0.25 mg total) by mouth 2 (two) times a day. 180 tablet 1     pregabalin (LYRICA) 100 MG capsule Take 1 capsule by mouth daily       fish oil-omega-3 fatty acids (FISH OIL) 300-1,000 mg capsule Take 2 g by mouth daily OTC (Patient not taking: Reported on 9/14/2021)       IMAGING:  EXAM: MR LUMBAR SPINE WO CONTRAST  LOCATION: [facility]  DATE/TIME: 3/23/2019 10:50 AM  INDICATION: Acute on chronic low back pain  COMPARISON: None.  TECHNIQUE: Without IV contrast  FINDINGS:   Nomenclature is based on 5 lumbar type vertebral bodies. Normal vertebral body heights and marrow signal. 3 mm grade 1 anterolisthesis of L5 on S1 There is a chronic Schmorl's node along the anterior superior endplate of S1. The  conus tip is identified at L1. No extraspinal abnormality. The visualized portions of the bony pelvis are normal for age.  T12-L1: Normal disc height and signal. No herniation. Mild facet arthropathy. No spinal canal stenosis. No right neural foraminal stenosis. No left neural foraminal stenosis.  L1-L2: Normal disc height and signal. No herniation. Mild facet arthropathy. No spinal canal stenosis. No right neural foraminal stenosis. No left neural foraminal stenosis.  L2-L3: Normal disc height and signal. No herniation. Mild to moderate facet arthropathy. No spinal canal stenosis. No right neural foraminal stenosis. No left neural foraminal stenosis.  L3-L4: Moderate loss of disc height and signal. Broad-based circumferential disc osteophyte complex. Moderate facet arthropathy. Mild spinal canal stenosis with mild narrowing of the lateral recesses. Mild right neural foraminal stenosis. Moderate left neural foraminal stenosis.  L4-L5: Mild loss of disc height and signal. Broad-based circumferential disc osteophyte complex with superimposed, partially desiccated left foraminal disc protrusion. Moderate to severe right and moderate left facet arthropathy. No central spinal canal stenosis, though there is mild narrowing of the lateral recesses. Mild to moderate right neural foraminal stenosis. Moderate left neural foraminal stenosis.  L5-S1: Anterolisthesis with moderate loss of disc height and signal. Small circumferential disc bulge. Moderate bilateral facet arthropathy, right greater than left. No spinal canal stenosis. Mild to moderate right neural foraminal stenosis. No left   neural foraminal stenosis.    CONCLUSION:  1.  Multilevel degenerative changes of the lumbar spine as described in detail above, with mild spinal canal stenosis and narrowing of the lateral recesses at L3-4, where there is mild right and moderate left neural foraminal stenosis.  2.  At L4-5, there is mild narrowing of the lateral recesses  with mild to moderate right and moderate left neural foraminal stenosis.  3.  At L5-S1, there is mild to moderate right neural foraminal stenosis.    PHYSICAL EXAM:  The patient is able to stand from a sitting position without difficulty.  He has no weakness or numbness in the lower extremities.  Straight leg raising is negative.  There is diffuse tenderness in the lower lumbar area bilaterally.  There is increased pain in the axial lumbar area with rotation, extension and lateral bending.  His gait is normal.    Jamaal Lopez MD  formerly [facility]

## 2021-09-15 NOTE — TELEPHONE ENCOUNTER
Patient in clinic with JOSE on 9/14/21, asking about getting a refill of ketamine.  Ketamine last filled on 9/13/21, 7 day refill  Pending Prescriptions:                       Disp   Refills    ketamine HCl POWD                         60 g   1            Sig: Ketamine 20 mg, dissolve 1 shai under the           tongue up to four times daily as needed, fill #60           troches    [facility] pharmacy

## 2021-09-15 NOTE — TELEPHONE ENCOUNTER
pt called for refill of ketamine.  He was to call after a week at the increased dose with his response to see if he was satisfied with that dose or if we needed a higher dose.  I left a message with him in that regard.

## 2021-09-18 ENCOUNTER — APPOINTMENT (OUTPATIENT)
Dept: RADIOLOGY | Facility: HOSPITAL | Age: 51
End: 2021-09-18
Attending: STUDENT IN AN ORGANIZED HEALTH CARE EDUCATION/TRAINING PROGRAM
Payer: COMMERCIAL

## 2021-09-18 ENCOUNTER — HOSPITAL ENCOUNTER (EMERGENCY)
Facility: HOSPITAL | Age: 51
Discharge: HOME | End: 2021-09-18
Attending: EMERGENCY MEDICINE | Admitting: EMERGENCY MEDICINE
Payer: COMMERCIAL

## 2021-09-18 VITALS
BODY MASS INDEX: 38.52 KG/M2 | OXYGEN SATURATION: 94 % | HEART RATE: 96 BPM | RESPIRATION RATE: 18 BRPM | DIASTOLIC BLOOD PRESSURE: 91 MMHG | TEMPERATURE: 98.2 F | WEIGHT: 300 LBS | SYSTOLIC BLOOD PRESSURE: 137 MMHG

## 2021-09-18 DIAGNOSIS — S92.354A CLOSED NONDISPLACED FRACTURE OF FIFTH METATARSAL BONE OF RIGHT FOOT, INITIAL ENCOUNTER: ICD-10-CM

## 2021-09-18 PROCEDURE — 73620 X-RAY EXAM OF FOOT: CPT | Mod: RT

## 2021-09-18 PROCEDURE — 99284 EMERGENCY DEPT VISIT MOD MDM: CPT | Mod: 25

## 2021-09-18 PROCEDURE — 29515 APPLICATION SHORT LEG SPLINT: CPT | Mod: RT

## 2021-09-18 RX ORDER — OXYCODONE HYDROCHLORIDE 5 MG/1
5 TABLET ORAL EVERY 8 HOURS PRN
Qty: 10 TABLET | Refills: 0 | Status: SHIPPED | OUTPATIENT
Start: 2021-09-18 | End: 2021-10-19

## 2021-09-18 NOTE — Clinical Note
Shaun Calvo was seen and treated in our emergency department on 9/18/2021.  He may return to work on 09/27/2021.  Return to work, per orthopedist's instructions please.      If you have any questions or concerns, please don't hesitate to call.      Celina Barnett MD

## 2021-09-18 NOTE — ED PROVIDER NOTES
"  Emergency Department Encounter     Evaluation Date & Time:   No admission date for patient encounter.    CHIEF COMPLAINT:  Foot Pain      Triage Note:Pt was walking to his van this AM and he heard a \"pop\" in his R foot. Pt states his foot is now painful and swollen. Denies falling, no other complaints.         Impression and Plan       FINAL IMPRESSION:    ICD-10-CM    1. Closed nondisplaced fracture of fifth metatarsal bone of right foot, initial encounter  S92.354A Ankle/Foot Bracing Supplies Order         ED COURSE & MEDICAL DECISION MAKING:  3:59 PM I met with the patient, obtained history, performed an initial exam, and discussed options and plan for diagnostics and treatment here in the ED. I reviewed Imaging findings with patient and plans for discharge with orthopedic follow-up. Patient was comfortable with plan. Will give patient crutches and place boot.     50 yo M, history of HTN, asthma and morbid obesity, who presents for evaluation of atraumatic right foot pain that started acutely ~4 hours ago while he was walking to his car. He states that he suddenly felt and heard a loud \"pop\" with immediate onset of pain to the lateral aspect of his foot. Pain is worse with weight-bearing.     On exam, foot is atraumatic with tenderness to palpation over the lateral aspect of the proximal right foot. CMS intact.    X-rays demonstrated non-displaced transverse fracture in the proximal diaphysis of the fifth metatarsal; no angulation or articular surface involvement.    Patient placed in a walking boot and was fitted for crutches.  He was advised to remain strictly non-weight-bearing until follow-up with the orthopedist.    Patient was discharged home with follow-up [facility].  RICE and return precautions provided.  Patient stable throughout ED course.      At the conclusion of the encounter I discussed the results of all the tests and the disposition. The questions were answered. The patient acknowledged " "understanding and was agreeable with the care plan.      MEDICATIONS GIVEN IN THE EMERGENCY DEPARTMENT:  Medications - No data to display    NEW PRESCRIPTIONS STARTED AT TODAY'S ED VISIT:  Discharge Medication List as of 9/18/2021  4:14 PM      START taking these medications    Details   oxyCODONE (ROXICODONE) 5 MG tablet Take 1 tablet (5 mg) by mouth every 8 hours as needed for severe pain, Disp-10 tablet, R-0, Local Print             HPI     HPI     Shaun Calvo Jr. is a 51 year old male with a pertinent history of HTN, asthma and morbid obesity, who presents to this ED via walk in for evaluation of right foot pain.    This morning while walking to his car, he heard and felt a loud \"pop\" to the lateral aspect of his right foot. The pain is worse with walking and weight-bearing. He did go to work for about 4 hours, but the pain became too unbearable. Patient noticed having increased swelling in his right foot.    He has taken ketamine, Toradol injections and naproxen, which has not provided him with significant relief.      Patient had previously torn his left meniscus and ambulates with a cane at baseline. Patient otherwise denies chest pain, SOB, abdominal pain, N/V/D, cough, fever or other concerns.    REVIEW OF SYSTEMS:  All other systems reviewed and are negative.      Medical History     Past Medical History:   Diagnosis Date     Anxiety      Asthma      Atrial fibrillation (H)      Depression      Hypertension        No past surgical history on file.    Family History   Problem Relation Age of Onset     Heart Disease Father        Social History     Tobacco Use     Smoking status: Former Smoker     Smokeless tobacco: Never Used   Substance Use Topics     Alcohol use: Not on file     Drug use: Not on file       oxyCODONE (ROXICODONE) 5 MG tablet  albuterol (PROAIR HFA;PROVENTIL HFA;VENTOLIN HFA) 90 mcg/actuation inhaler  B complex-vitamin C-folic acid 400 mcg tablet  cholecalciferol, vitamin D3, 1,000 " unit (25 mcg) tablet  cyclobenzaprine (FLEXERIL) 10 MG tablet  fish oil-omega-3 fatty acids (FISH OIL) 300-1,000 mg capsule  hydroCHLOROthiazide (HYDRODIURIL) 25 MG tablet  hydrOXYzine HCL (ATARAX) 25 MG tablet  ketamine HCl POWD  ketorolac (TORADOL) 30 MG/ML injection  lisinopriL (PRINIVIL,ZESTRIL) 20 MG tablet  medical cannabis (Patient's own supply)  naproxen (NAPROSYN) 500 MG tablet  omeprazole (PRILOSEC) 20 MG capsule  pramipexole (MIRAPEX) 0.25 MG tablet  pregabalin (LYRICA) 100 MG capsule        Physical Exam     First Vitals:  Patient Vitals for the past 24 hrs:   BP Temp Temp src Pulse Resp SpO2 Weight   09/18/21 1425 (!) 137/91 98.2  F (36.8  C) Temporal 96 18 94 % 136.1 kg (300 lb)       PHYSICAL EXAM:   Physical Exam    GENERAL: Awake, alert.  In mild acute distress.   HEENT: Normocephalic, atraumatic. Pupils equal, round and reactive. Conjunctiva normal.  NECK: No stridor.  PULMONARY: Symmetrical breath sounds without distress.  Lungs clear to auscultation bilaterally without wheezes, rhonchi or rales.  CARDIO: Regular rate and rhythm.  No significant murmur, rub or gallop.    ABDOMINAL: Abdomen soft, non-distended and non-tender to palpation.    EXTREMITIES: Foot is atraumatic. There is tenderness to palpation over the lateral aspect of the proximal right foot. The foot is warm and well perfused with strong pedal pulse. He is able to wiggle his toes and sensation to light touch is grossly intact.     NEURO: Alert and oriented to person, place and time.  Cranial nerves grossly intact.  No focal motor deficit.  PSYCH: Normal mood and affect.      Results       RADIOLOGY:  XR Foot Right 2 Views   Final Result   IMPRESSION: Nondisplaced transverse fracture in the proximal diaphysis of the fifth metatarsal. No angulation. No articular surface involvement.             I, Sara Behmanesh, am serving as a scribe to document services personally performed by Celina Barnett MD based on my observation and the  provider's statements to me. I, Celina Barnett MD attest that Sara Behmanesh is acting in a scribe capacity, has observed my performance of the services and has documented them in accordance with my direction.    Celina Barnett MD  Emergency Medicine  [facility] EMERGENCY DEPARTMENT           Celina Barnett MD  09/18/21 2006

## 2021-09-18 NOTE — ED TRIAGE NOTES
"Pt was walking to his van this AM and he heard a \"pop\" in his R foot. Pt states his foot is now painful and swollen. Denies falling, no other complaints.   "

## 2021-09-18 NOTE — DISCHARGE INSTRUCTIONS
Please follow-up with the [facility] this week; call Monday to arrange appointment.    You should not bear any weight on the right leg pending orthopedics consult.    Return to the ER for worsening symptoms, worsening pain, weakness / numbness / color changes to the leg / foot, fever or other concerns.    Rest the right leg and elevate it while at rest. Apply ice (15 minutes on and then 15 minutes off) to help minimize pain and swelling.

## 2021-09-20 ENCOUNTER — MYC REFILL (OUTPATIENT)
Dept: FAMILY MEDICINE | Facility: CLINIC | Age: 51
End: 2021-09-20

## 2021-09-20 DIAGNOSIS — M54.16 LUMBAR RADICULITIS: ICD-10-CM

## 2021-09-20 DIAGNOSIS — S29.011D INTERCOSTAL MUSCLE STRAIN, SUBSEQUENT ENCOUNTER: ICD-10-CM

## 2021-09-20 DIAGNOSIS — M47.816 SPONDYLOSIS OF LUMBAR REGION WITHOUT MYELOPATHY OR RADICULOPATHY: ICD-10-CM

## 2021-09-20 DIAGNOSIS — M51.26 LUMBAR DISC HERNIATION: ICD-10-CM

## 2021-09-20 RX ORDER — NAPROXEN 500 MG/1
500 TABLET ORAL 2 TIMES DAILY WITH MEALS
Qty: 28 TABLET | Refills: 0 | Status: SHIPPED | OUTPATIENT
Start: 2021-09-20 | End: 2022-01-07

## 2021-09-20 RX ORDER — CYCLOBENZAPRINE HCL 10 MG
TABLET ORAL
Qty: 60 TABLET | Refills: 0 | Status: SHIPPED | OUTPATIENT
Start: 2021-09-20 | End: 2021-10-06

## 2021-09-20 NOTE — TELEPHONE ENCOUNTER
"Routing refill request to provider for review/approval because:  Drug not on the [facility] refill protocol   Labs out of range:  Multiple     Last Written Prescription Date:  7/21/21  Last Fill Quantity: 28/60,  # refills: 0   Last office visit provider:  6/22/21     Requested Prescriptions   Pending Prescriptions Disp Refills     naproxen (NAPROSYN) 500 MG tablet 28 tablet 0       NSAID Medications Failed - 9/20/2021  7:20 AM        Failed - Blood pressure under 140/90 in past 12 months     BP Readings from Last 3 Encounters:   09/18/21 (!) 137/91   09/14/21 (!) 199/114   08/27/21 (!) 164/88                 Failed - Normal ALT on file in past 12 months     Recent Labs   Lab Test 05/19/21  1747   ALT 46*             Failed - Normal AST on file in past 12 months     Recent Labs   Lab Test 05/19/21  1747   AST 51*             Failed - Normal CBC on file in past 12 months     Recent Labs   Lab Test 02/01/21  1929   WBC 6.2   RBC 4.65   HGB 13.7*   HCT 40.8                    Passed - Recent (12 mo) or future (30 days) visit within the authorizing provider's specialty     Patient has had an office visit with the authorizing provider or a provider within the authorizing providers department within the previous 12 mos or has a future within next 30 days. See \"Patient Info\" tab in inbasket, or \"Choose Columns\" in Meds & Orders section of the refill encounter.              Passed - Patient is age 6-64 years        Passed - Medication is active on med list        Passed - Normal serum creatinine on file in past 12 months     Recent Labs   Lab Test 05/19/21  1747   CR 1.18       Ok to refill medication if creatinine is low             cyclobenzaprine (FLEXERIL) 10 MG tablet 60 tablet 0     Sig: TAKE 1 TABLET(10 MG) BY MOUTH THREE TIMES DAILY AS NEEDED FOR MUSCLE SPASMS       There is no refill protocol information for this order          Delmer Berger RN 09/20/21 1:05 PM  "

## 2021-09-21 ENCOUNTER — OFFICE VISIT (OUTPATIENT)
Dept: INTERNAL MEDICINE | Facility: CLINIC | Age: 51
End: 2021-09-21
Payer: COMMERCIAL

## 2021-09-21 VITALS
WEIGHT: 315 LBS | TEMPERATURE: 99.4 F | BODY MASS INDEX: 40.43 KG/M2 | OXYGEN SATURATION: 97 % | HEIGHT: 74 IN | HEART RATE: 108 BPM | DIASTOLIC BLOOD PRESSURE: 98 MMHG | RESPIRATION RATE: 18 BRPM | SYSTOLIC BLOOD PRESSURE: 150 MMHG

## 2021-09-21 DIAGNOSIS — M54.50 CHRONIC BILATERAL LOW BACK PAIN WITHOUT SCIATICA: ICD-10-CM

## 2021-09-21 DIAGNOSIS — F43.10 PTSD (POST-TRAUMATIC STRESS DISORDER): ICD-10-CM

## 2021-09-21 DIAGNOSIS — G89.29 CHRONIC BILATERAL LOW BACK PAIN WITHOUT SCIATICA: ICD-10-CM

## 2021-09-21 DIAGNOSIS — Z01.818 PRE-OP EXAM: Primary | ICD-10-CM

## 2021-09-21 DIAGNOSIS — S92.354D NONDISPLACED FRACTURE OF FIFTH METATARSAL BONE, RIGHT FOOT, SUBSEQUENT ENCOUNTER FOR FRACTURE WITH ROUTINE HEALING: ICD-10-CM

## 2021-09-21 LAB
ANION GAP SERPL CALCULATED.3IONS-SCNC: 4 MMOL/L (ref 3–14)
BUN SERPL-MCNC: 12 MG/DL (ref 7–30)
CALCIUM SERPL-MCNC: 8.8 MG/DL (ref 8.5–10.1)
CHLORIDE BLD-SCNC: 105 MMOL/L (ref 94–109)
CO2 SERPL-SCNC: 31 MMOL/L (ref 20–32)
CREAT SERPL-MCNC: 1.33 MG/DL (ref 0.66–1.25)
GFR SERPL CREATININE-BSD FRML MDRD: 61 ML/MIN/1.73M2
GLUCOSE BLD-MCNC: 94 MG/DL (ref 70–99)
HGB BLD-MCNC: 14.2 G/DL (ref 13.3–17.7)
POTASSIUM BLD-SCNC: 4.2 MMOL/L (ref 3.4–5.3)
SODIUM SERPL-SCNC: 140 MMOL/L (ref 133–144)

## 2021-09-21 PROCEDURE — 85018 HEMOGLOBIN: CPT | Performed by: FAMILY MEDICINE

## 2021-09-21 PROCEDURE — 93000 ELECTROCARDIOGRAM COMPLETE: CPT | Performed by: FAMILY MEDICINE

## 2021-09-21 PROCEDURE — 80048 BASIC METABOLIC PNL TOTAL CA: CPT | Performed by: FAMILY MEDICINE

## 2021-09-21 PROCEDURE — 99214 OFFICE O/P EST MOD 30 MIN: CPT | Performed by: FAMILY MEDICINE

## 2021-09-21 PROCEDURE — 36415 COLL VENOUS BLD VENIPUNCTURE: CPT | Performed by: FAMILY MEDICINE

## 2021-09-21 RX ORDER — KETAMINE HCL 100 %
POWDER (GRAM) MISCELLANEOUS
Qty: 60 G | Refills: 2 | Status: SHIPPED | OUTPATIENT
Start: 2021-09-21 | End: 2021-10-19

## 2021-09-21 ASSESSMENT — PATIENT HEALTH QUESTIONNAIRE - PHQ9: SUM OF ALL RESPONSES TO PHQ QUESTIONS 1-9: 14

## 2021-09-21 ASSESSMENT — MIFFLIN-ST. JEOR: SCORE: 2452.45
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
## 2021-09-22 ENCOUNTER — TELEPHONE (OUTPATIENT)
Dept: PALLIATIVE MEDICINE | Facility: OTHER | Age: 51
End: 2021-09-22

## 2021-09-22 ASSESSMENT — ASTHMA QUESTIONNAIRES: ACT_TOTALSCORE: 19

## 2021-09-22 NOTE — TELEPHONE ENCOUNTER
Patient leaves a message stating the pharmacy never received the Ketamine order and he needs it today so it needs to be sent.  RN called [facility] and confirmed they have the Rx, but need the AMN Form.  Dr. Nagel, please send in the AMN Form for the patient.

## 2021-10-06 ENCOUNTER — TELEPHONE (OUTPATIENT)
Dept: PALLIATIVE MEDICINE | Facility: OTHER | Age: 51
End: 2021-10-06

## 2021-10-06 DIAGNOSIS — M47.816 SPONDYLOSIS OF LUMBAR REGION WITHOUT MYELOPATHY OR RADICULOPATHY: ICD-10-CM

## 2021-10-06 DIAGNOSIS — M54.16 LUMBAR RADICULITIS: ICD-10-CM

## 2021-10-06 DIAGNOSIS — M51.26 LUMBAR DISC HERNIATION: ICD-10-CM

## 2021-10-06 DIAGNOSIS — I10 ESSENTIAL HYPERTENSION: ICD-10-CM

## 2021-10-06 RX ORDER — LISINOPRIL 20 MG/1
TABLET ORAL
Qty: 30 TABLET | Refills: 11 | Status: SHIPPED | OUTPATIENT
Start: 2021-10-06 | End: 2021-10-27

## 2021-10-06 RX ORDER — LISINOPRIL 20 MG/1
TABLET ORAL
Qty: 30 TABLET | Refills: 11 | Status: CANCELLED | OUTPATIENT
Start: 2021-10-06

## 2021-10-06 RX ORDER — CYCLOBENZAPRINE HCL 10 MG
TABLET ORAL
Qty: 60 TABLET | Refills: 0 | Status: SHIPPED | OUTPATIENT
Start: 2021-10-06 | End: 2022-02-16

## 2021-10-06 NOTE — TELEPHONE ENCOUNTER
Hello-    I just faxed over a new AMN form to be signed for Mr. Calvo's Ketamine. If you could get that signed and faxed back to us, we will be very appreciative.      Thank you for your time    Lakeisha Do-Jayden  [facility]    [phone]

## 2021-10-06 NOTE — TELEPHONE ENCOUNTER
"Routing refill request to provider for review/approval because:  Labs out of range:  multiple    Last Written Prescription Date:  9/17/20  Last Fill Quantity: 30,  # refills: 11   Last office visit provider:  6/22/21     Requested Prescriptions   Pending Prescriptions Disp Refills     lisinopril (ZESTRIL) 20 MG tablet [Pharmacy Med Name: LISINOPRIL 20MG TABLETS] 30 tablet 11     Sig: TAKE 1 TABLET(20 MG) BY MOUTH DAILY       ACE Inhibitors (Including Combos) Protocol Failed - 10/6/2021 10:16 AM        Failed - Blood pressure under 140/90 in past 12 months     BP Readings from Last 3 Encounters:   09/21/21 (!) 150/98   09/18/21 (!) 137/91   09/14/21 (!) 199/114                 Failed - Normal serum creatinine on file in past 12 months     Recent Labs   Lab Test 09/21/21  1149   CR 1.33*       Ok to refill medication if creatinine is low          Passed - Recent (12 mo) or future (30 days) visit within the authorizing provider's specialty     Patient has had an office visit with the authorizing provider or a provider within the authorizing providers department within the previous 12 mos or has a future within next 30 days. See \"Patient Info\" tab in inbasket, or \"Choose Columns\" in Meds & Orders section of the refill encounter.              Passed - Medication is active on med list        Passed - Patient is age 18 or older        Passed - Normal serum potassium on file in past 12 months     Recent Labs   Lab Test 09/21/21  1149   POTASSIUM 4.2                cyclobenzaprine (FLEXERIL) 10 MG tablet [Pharmacy Med Name: CYCLOBENZAPRINE 10MG TABLETS] 60 tablet 0     Sig: TAKE 1 TABLET(10 MG) BY MOUTH THREE TIMES DAILY AS NEEDED FOR MUSCLE SPASMS       There is no refill protocol information for this order          Lavern Vera RN 10/06/21 1:10 PM  "

## 2021-10-06 NOTE — TELEPHONE ENCOUNTER
"Routing refill request to provider for review/approval because:  Drug not on the [facility] refill protocol     Last Written Prescription Date:  9/20/21  Last Fill Quantity: 60,  # refills: 0   Last office visit provider:  6/22/21     Requested Prescriptions   Pending Prescriptions Disp Refills     lisinopril (ZESTRIL) 20 MG tablet [Pharmacy Med Name: LISINOPRIL 20MG TABLETS] 30 tablet 11     Sig: TAKE 1 TABLET(20 MG) BY MOUTH DAILY       ACE Inhibitors (Including Combos) Protocol Failed - 10/6/2021 10:16 AM        Failed - Blood pressure under 140/90 in past 12 months     BP Readings from Last 3 Encounters:   09/21/21 (!) 150/98   09/18/21 (!) 137/91   09/14/21 (!) 199/114                 Failed - Normal serum creatinine on file in past 12 months     Recent Labs   Lab Test 09/21/21  1149   CR 1.33*       Ok to refill medication if creatinine is low          Passed - Recent (12 mo) or future (30 days) visit within the authorizing provider's specialty     Patient has had an office visit with the authorizing provider or a provider within the authorizing providers department within the previous 12 mos or has a future within next 30 days. See \"Patient Info\" tab in inbasket, or \"Choose Columns\" in Meds & Orders section of the refill encounter.              Passed - Medication is active on med list        Passed - Patient is age 18 or older        Passed - Normal serum potassium on file in past 12 months     Recent Labs   Lab Test 09/21/21  1149   POTASSIUM 4.2                cyclobenzaprine (FLEXERIL) 10 MG tablet [Pharmacy Med Name: CYCLOBENZAPRINE 10MG TABLETS] 60 tablet 0     Sig: TAKE 1 TABLET(10 MG) BY MOUTH THREE TIMES DAILY AS NEEDED FOR MUSCLE SPASMS       There is no refill protocol information for this order          Lavern Vera RN 10/06/21 1:07 PM  "

## 2021-10-17 ENCOUNTER — HEALTH MAINTENANCE LETTER (OUTPATIENT)
Age: 51
End: 2021-10-17

## 2021-10-19 ENCOUNTER — VIRTUAL VISIT (OUTPATIENT)
Dept: PALLIATIVE MEDICINE | Facility: OTHER | Age: 51
End: 2021-10-19
Payer: COMMERCIAL

## 2021-10-19 DIAGNOSIS — G89.29 CHRONIC BILATERAL LOW BACK PAIN WITHOUT SCIATICA: ICD-10-CM

## 2021-10-19 DIAGNOSIS — M54.50 CHRONIC BILATERAL LOW BACK PAIN WITHOUT SCIATICA: ICD-10-CM

## 2021-10-19 DIAGNOSIS — I10 ESSENTIAL HYPERTENSION: ICD-10-CM

## 2021-10-19 PROCEDURE — 99214 OFFICE O/P EST MOD 30 MIN: CPT | Mod: 95 | Performed by: ANESTHESIOLOGY

## 2021-10-19 RX ORDER — KETAMINE HCL 100 %
POWDER (GRAM) MISCELLANEOUS
Qty: 60 G | Refills: 2 | Status: SHIPPED | OUTPATIENT
Start: 2021-11-15 | End: 2021-11-04

## 2021-10-19 ASSESSMENT — PAIN SCALES - GENERAL: PAINLEVEL: EXTREME PAIN (9)

## 2021-10-19 NOTE — PROGRESS NOTES
Jaime is a 51 year old who is being evaluated via a billable video visit.      How would you like to obtain your AVS? Disconnecthart  If the video visit is dropped, the invitation should be resent by: Text to cell phone: [phone]  Will anyone else be joining your video visit? No      Video-Visit Details    Type of service:  Video Visit    Distant Location (provider location):  [facility]     Platform used for Video Visit: Doximity    Pain score: 9/10  Constant   What does your pain feel like: agonizing, muscle spasms  Does the pain interfere with:  Work: yes  Walking/distance: yes  Sleep: yes  Daily activities: yes  Relationships/social life: yes  Mood: yes  F= 8    Additional information: Needs to schedule Nerve block as well now that it has been approved per patient. Ketamine does help very well, even with depression, OCD and focus is right on point.  Haven't felt better in years.  Unfortunately, the refill dates do not over lap, out a couple days before new fill comes.  Seems the mailing of the medication is the problem.

## 2021-10-19 NOTE — PATIENT INSTRUCTIONS
PLAN:  You will contact the [facility] pain center to schedule a medial branch block with Dr. Lopez for your back.    Continue with the ketamine 60 mg lozenges, 1/2 lozenge under your tongue 4 times a day.  Refill will be sent to the [facility] pharmacy and you may call them at [phone] to see if it would be less expensive and easier to work with.    You have decrease the Toradol injections every 3 or 4 days and stopped Naprosyn.    Follow-up with Dr. Nagel in 8 to 10 weeks

## 2021-10-19 NOTE — LETTER
10/19/2021         RE: Shaun Calvo Jr.  [address]  [place] [place] [facility] [address]        Dear Colleague,    Thank you for referring your patient, Shaun Calvo Jr., to the [facility]. Please see a copy of my visit note below.    Jaime is a 51 year old who is being evaluated via a billable video visit.      How would you like to obtain your AVS? MyChart  If the video visit is dropped, the invitation should be resent by: Text to cell phone: [phone]  Will anyone else be joining your video visit? No      Video-Visit Details    Type of service:  Video Visit    Distant Location (provider location):  [facility]     Platform used for Video Visit: Doximity    Pain score: 9/10  Constant   What does your pain feel like: agonizing, muscle spasms  Does the pain interfere with:  Work: yes  Walking/distance: yes  Sleep: yes  Daily activities: yes  Relationships/social life: yes  Mood: yes  F= 8    Additional information: Needs to schedule Nerve block as well now that it has been approved per patient. Ketamine does help very well, even with depression, OCD and focus is right on point.  Haven't felt better in years.  Unfortunately, the refill dates do not over lap, out a couple days before new fill comes.  Seems the mailing of the medication is the problem.      Jaime is a 51 year old who is being evaluated via a billable video visit.      How would you like to obtain your AVS?   If the video visit is dropped, the invitation should be resent by:   Will anyone else be joining your video visit?       Video Start Time:         Subjective   Jaime is a 51 year old who presents for the following health issues good osteoarthritis of the knees.    Reviewing the records seen in the emergency room 9/18 for foot fracture.    HPI     Reviews today that he indeed had a fracture with his foot, working with [facility] had a screw placed 3 weeks ago.  Describes his recovery went back to work a  "week ago.  Using crutches did flareup his shoulder so now is using a knee heidi and a big boot at work for support.    Has had some muscle basilar is on his left rib cage he attributes to changes mechanics with this.    He has postponed his medial branch block for his back until this stabilizes.    He is followed with [facility] for his knee, told he needs a knee replacement but will also be to postpone that for financial reasons.    Describes the ketamine is \"great, Aj helps with pain\".  Also helps to focus with his OCD and PTSD very much improved.  He is not as angry.  His boss at work noticed a difference.  He is using 60 mg lozenges, 1/2 lozenge 4 times a day without side effects.    Has been able to space out the Toradol injections from every other day to every third or fourth day and has stopped taking Naprosyn.      Current Outpatient Medications:      albuterol (PROAIR HFA;PROVENTIL HFA;VENTOLIN HFA) 90 mcg/actuation inhaler, [ALBUTEROL (PROAIR HFA;PROVENTIL HFA;VENTOLIN HFA) 90 MCG/ACTUATION INHALER] Inhale 2 puffs every 4 (four) hours as needed., Disp: 8.5 g, Rfl: 5     cholecalciferol, vitamin D3, 1,000 unit (25 mcg) tablet, [CHOLECALCIFEROL, VITAMIN D3, 1,000 UNIT (25 MCG) TABLET] 1 tab daily 1 week, 2 tabs daily 1 weeks, 3 tab daily 1 week, then 4 daily, Disp: 120 tablet, Rfl: 2     cyclobenzaprine (FLEXERIL) 10 MG tablet, TAKE 1 TABLET(10 MG) BY MOUTH THREE TIMES DAILY AS NEEDED FOR MUSCLE SPASMS, Disp: 60 tablet, Rfl: 0     fish oil-omega-3 fatty acids (FISH OIL) 300-1,000 mg capsule, Take 2 g by mouth daily OTC, Disp: , Rfl:      hydroCHLOROthiazide (HYDRODIURIL) 25 MG tablet, [HYDROCHLOROTHIAZIDE (HYDRODIURIL) 25 MG TABLET] Take 1 tablet (25 mg total) by mouth daily., Disp: 90 tablet, Rfl: 3     hydrOXYzine HCL (ATARAX) 25 MG tablet, [HYDROXYZINE HCL (ATARAX) 25 MG TABLET] TAKE 1 TABLET BY MOUTH THREE TIMES DAILY AS NEEDED FOR ANXIETY(PANIC ATTACK), Disp: 60 tablet, Rfl: 3     [START ON " 11/15/2021] ketamine HCl POWD, Ketamine 60 mg one-half under tongue 4 times a day, Disp: 60 g, Rfl: 2     ketorolac (TORADOL) 30 MG/ML injection, Inject 1 mL (30 mg) into the muscle 3 times daily as needed for moderate pain, Disp: 8 mL, Rfl: 2     lisinopril (ZESTRIL) 20 MG tablet, TAKE 1 TABLET(20 MG) BY MOUTH DAILY, Disp: 30 tablet, Rfl: 11     medical cannabis (Patient's own supply), See Admin Instructions (The purpose of this order is to document that the patient reports taking medical cannabis.  This is not a prescription, and is not used to certify that the patient has a qualifying medical condition.) Vaping Oil andFlower leaf; 3 puffs inhaled every 3 hours as needed, Disp: , Rfl:      naproxen (NAPROSYN) 500 MG tablet, Take 1 tablet (500 mg) by mouth 2 times daily (with meals), Disp: 28 tablet, Rfl: 0     omeprazole (PRILOSEC) 20 MG capsule, Take 20 mg by mouth every morning OTC, Disp: , Rfl:      pramipexole (MIRAPEX) 0.25 MG tablet, [PRAMIPEXOLE (MIRAPEX) 0.25 MG TABLET] Take 1 tablet (0.25 mg total) by mouth 2 (two) times a day., Disp: 180 tablet, Rfl: 1     pregabalin (LYRICA) 100 MG capsule, Take 1 capsule by mouth daily, Disp: , Rfl:      B complex-vitamin C-folic acid 400 mcg tablet, [B COMPLEX-VITAMIN C-FOLIC ACID 400 MCG TABLET] Take 1 tablet by mouth daily., Disp: 30 tablet, Rfl: 5  Is alert with a clear sensorium good eye contact.  Thought process logical.  Much brighter affect indeed.  No respiratory distress.    Review of Systems         Objective    Vitals - Patient Reported  Pain Score: Extreme Pain (9)        Physical Exam         Time more than 25 minutes          Video-Visit Details    Type of service:  Video Visit    Video End Time:    Originating Location (pt. Location): home    Distant Location (provider location):  [facility]     Platform used for Video Visit: Well      Again, thank you for allowing me to participate in the care of your patient.         Sincerely,        CHITO HARKINS MD

## 2021-10-20 RX ORDER — LISINOPRIL 20 MG/1
TABLET ORAL
Qty: 30 TABLET | Refills: 11 | OUTPATIENT
Start: 2021-10-20

## 2021-10-20 NOTE — PROGRESS NOTES
"Jaime is a 51 year old who is being evaluated via a billable video visit.      How would you like to obtain your AVS?   If the video visit is dropped, the invitation should be resent by:   Will anyone else be joining your video visit?       Video Start Time:         Subjective   Jaime is a 51 year old who presents for the following health issues good osteoarthritis of the knees.    Reviewing the records seen in the emergency room 9/18 for foot fracture.    [facility]     Reviews today that he indeed had a fracture with his foot, working with [facility] had a screw placed 3 weeks ago.  Describes his recovery went back to work a week ago.  Using crutches did flareup his shoulder so now is using a knee heidi and a big boot at work for support.    Has had some muscle basilar is on his left rib cage he attributes to changes mechanics with this.    He has postponed his medial branch block for his back until this stabilizes.    He is followed with [facility] for his knee, told he needs a knee replacement but will also be to postpone that for financial reasons.    Describes the ketamine is \"great, Aj helps with pain\".  Also helps to focus with his OCD and PTSD very much improved.  He is not as angry.  His boss at work noticed a difference.  He is using 60 mg lozenges, 1/2 lozenge 4 times a day without side effects.    Has been able to space out the Toradol injections from every other day to every third or fourth day and has stopped taking Naprosyn.      Current Outpatient Medications:      albuterol (PROAIR HFA;PROVENTIL HFA;VENTOLIN HFA) 90 mcg/actuation inhaler, [ALBUTEROL (PROAIR HFA;PROVENTIL HFA;VENTOLIN HFA) 90 MCG/ACTUATION INHALER] Inhale 2 puffs every 4 (four) hours as needed., Disp: 8.5 g, Rfl: 5     cholecalciferol, vitamin D3, 1,000 unit (25 mcg) tablet, [CHOLECALCIFEROL, VITAMIN D3, 1,000 UNIT (25 MCG) TABLET] 1 tab daily 1 week, 2 tabs daily 1 weeks, 3 tab daily 1 week, then 4 daily, Disp: 120 tablet, " Rfl: 2     cyclobenzaprine (FLEXERIL) 10 MG tablet, TAKE 1 TABLET(10 MG) BY MOUTH THREE TIMES DAILY AS NEEDED FOR MUSCLE SPASMS, Disp: 60 tablet, Rfl: 0     fish oil-omega-3 fatty acids (FISH OIL) 300-1,000 mg capsule, Take 2 g by mouth daily OTC, Disp: , Rfl:      hydroCHLOROthiazide (HYDRODIURIL) 25 MG tablet, [HYDROCHLOROTHIAZIDE (HYDRODIURIL) 25 MG TABLET] Take 1 tablet (25 mg total) by mouth daily., Disp: 90 tablet, Rfl: 3     hydrOXYzine HCL (ATARAX) 25 MG tablet, [HYDROXYZINE HCL (ATARAX) 25 MG TABLET] TAKE 1 TABLET BY MOUTH THREE TIMES DAILY AS NEEDED FOR ANXIETY(PANIC ATTACK), Disp: 60 tablet, Rfl: 3     [START ON 11/15/2021] ketamine HCl POWD, Ketamine 60 mg one-half under tongue 4 times a day, Disp: 60 g, Rfl: 2     ketorolac (TORADOL) 30 MG/ML injection, Inject 1 mL (30 mg) into the muscle 3 times daily as needed for moderate pain, Disp: 8 mL, Rfl: 2     lisinopril (ZESTRIL) 20 MG tablet, TAKE 1 TABLET(20 MG) BY MOUTH DAILY, Disp: 30 tablet, Rfl: 11     medical cannabis (Patient's own supply), See Admin Instructions (The purpose of this order is to document that the patient reports taking medical cannabis.  This is not a prescription, and is not used to certify that the patient has a qualifying medical condition.) Vaping Oil andFlower leaf; 3 puffs inhaled every 3 hours as needed, Disp: , Rfl:      naproxen (NAPROSYN) 500 MG tablet, Take 1 tablet (500 mg) by mouth 2 times daily (with meals), Disp: 28 tablet, Rfl: 0     omeprazole (PRILOSEC) 20 MG capsule, Take 20 mg by mouth every morning OTC, Disp: , Rfl:      pramipexole (MIRAPEX) 0.25 MG tablet, [PRAMIPEXOLE (MIRAPEX) 0.25 MG TABLET] Take 1 tablet (0.25 mg total) by mouth 2 (two) times a day., Disp: 180 tablet, Rfl: 1     pregabalin (LYRICA) 100 MG capsule, Take 1 capsule by mouth daily, Disp: , Rfl:      B complex-vitamin C-folic acid 400 mcg tablet, [B COMPLEX-VITAMIN C-FOLIC ACID 400 MCG TABLET] Take 1 tablet by mouth daily., Disp: 30 tablet, Rfl:  5  Is alert with a clear sensorium good eye contact.  Thought process logical.  Much brighter affect indeed.  No respiratory distress.    Review of Systems         Objective    Vitals - Patient Reported  Pain Score: Extreme Pain (9)        Physical Exam         Time more than 25 minutes          Video-Visit Details    Type of service:  Video Visit    Video End Time:    Originating Location (pt. Location): home    Distant Location (provider location):  [facility]     Platform used for Video Visit: CelluComp

## 2021-10-26 NOTE — PROGRESS NOTES
Assessment/Plan:   Jaime is a 51 year old male here for physical     Routine adult health maintenance  Physical completed today.  Labs as below.  Up-to-date with immunizations, declines flu shot.  Up-to-date with colon cancer screening.    Essential hypertension  History of hypertension, blood pressure controlled on lisinopril, refill provided today.  Will check BMP today.  - Comprehensive metabolic panel (BMP + Alb, Alk Phos, ALT, AST, Total. Bili, TP)  - lisinopril (ZESTRIL) 20 MG tablet  Dispense: 90 tablet; Refill: 3    Lipid screening  Given age and weight, will check lipid panel today.  - Lipid panel reflex to direct LDL Fasting    Morbid obesity (H)  BMI 46.25 today.  Discussed healthy diet and regular exercise for weight loss; obviously this is limited at this time due to patient's musculoskeletal injuries/conditions.    Diabetes mellitus screening  Given age and weight, will screen for diabetes today with A1c.  - Hemoglobin A1c    Elevated transaminase level  Patient with mildly elevated LFTs on 05/19/21, will recheck today.  - Comprehensive metabolic panel (BMP + Alb, Alk Phos, ALT, AST, Total. Bili, TP)    Elevated serum creatinine  Creatinine mildly elevated to 1.33, GFR 61 on 08/21/21.  Will recheck today.  - Comprehensive metabolic panel (BMP + Alb, Alk Phos, ALT, AST, Total. Bili, TP)    Severe episode of recurrent major depressive disorder, without psychotic features (H)  PTSD (post-traumatic stress disorder)  Anxiety  Uncontrolled anxiety and depression at this time.  PHQ-9 and BRITTNEY-7 scores elevated.  Patient reports at this time his mood is not doing well mainly because of financial concerns, work issues and pain from knee and shoulder; he feels that if he can get these things under control his mood will be doing much better.  He declines referral to social work/care management today.  He is working with [facility] regarding knee and shoulder injuries; he is working with pain clinic on pain  management.  He declines referral to therapy/psychiatry and does not want to start antidepressant therapy today.  Will monitor closely.  Provided crisis information on AVS.       I have had an Advance Directives discussion with the patient.  The following high BMI interventions were performed this visit: encouragement to exercise and lifestyle education regarding diet    Follow up: 1 year for physical, sooner as needed    Harriet Vo MD  [facility]      Subjective:     Shaun Calvo Jr. is a 51 year old male who presents for an annual exam.     Answers for HPI/ROS submitted by the patient on 10/27/2021  If you checked off any problems, how difficult have these problems made it for you to do your work, take care of things at home, or get along with other people?: Extremely difficult  PHQ9 TOTAL SCORE: 18  Frequency of exercise:: None  Getting at least 3 servings of Calcium per day:: NO  Diet:: Regular (no restrictions)  Taking medications regularly:: Yes  Medication side effects:: Not applicable  Bi-annual eye exam:: Yes  Dental care twice a year:: NO  Sleep apnea or symptoms of sleep apnea:: None    -L knee torn meniscus - working with [facility]; R ankle doing well now - doesn't have short term disability so has had to do these injuries unpaid  -able to stay at living location now because won eviction case but having some issues still  -working with pain clinic - having nerve procedure done on 11/4  -ACT score 19  -PHQ9/GAD7 scores elevated - having a lot of stress as above and work related, pain is excessive    Health Maintenance reviewed:  Lipid Profile: needs  Glucose Screen: needs  Colonoscopy: up to date    Immunization History   Administered Date(s) Administered     COVID-19,PF,Moderna 05/15/2021, 06/12/2021     TD (ADULT, 7+) 05/10/2014     Tdap (Adacel,Boostrix) 11/29/2019     Immunization status: declines flu shot.    Current Outpatient Medications   Medication Sig Dispense Refill      albuterol (PROAIR HFA;PROVENTIL HFA;VENTOLIN HFA) 90 mcg/actuation inhaler [ALBUTEROL (PROAIR HFA;PROVENTIL HFA;VENTOLIN HFA) 90 MCG/ACTUATION INHALER] Inhale 2 puffs every 4 (four) hours as needed. 8.5 g 5     aspirin 81 MG EC tablet Take 81 mg by mouth daily       cyclobenzaprine (FLEXERIL) 10 MG tablet TAKE 1 TABLET(10 MG) BY MOUTH THREE TIMES DAILY AS NEEDED FOR MUSCLE SPASMS 60 tablet 0     fish oil-omega-3 fatty acids (FISH OIL) 300-1,000 mg capsule Take 2 g by mouth daily OTC       hydroCHLOROthiazide (HYDRODIURIL) 25 MG tablet [HYDROCHLOROTHIAZIDE (HYDRODIURIL) 25 MG TABLET] Take 1 tablet (25 mg total) by mouth daily. 90 tablet 3     hydrOXYzine HCL (ATARAX) 25 MG tablet [HYDROXYZINE HCL (ATARAX) 25 MG TABLET] TAKE 1 TABLET BY MOUTH THREE TIMES DAILY AS NEEDED FOR ANXIETY(PANIC ATTACK) 60 tablet 3     [START ON 11/15/2021] ketamine HCl POWD Ketamine 60 mg one-half under tongue 4 times a day 60 g 2     ketorolac (TORADOL) 30 MG/ML injection Inject 1 mL (30 mg) into the muscle 3 times daily as needed for moderate pain 8 mL 2     lisinopril (ZESTRIL) 20 MG tablet Take 1 tablet (20 mg) by mouth daily 90 tablet 3     medical cannabis (Patient's own supply) See Admin Instructions (The purpose of this order is to document that the patient reports taking medical cannabis.  This is not a prescription, and is not used to certify that the patient has a qualifying medical condition.)  Vaping Oil andFlower leaf; 3 puffs inhaled every 3 hours as needed       naproxen (NAPROSYN) 500 MG tablet Take 1 tablet (500 mg) by mouth 2 times daily (with meals) 28 tablet 0     omeprazole (PRILOSEC) 20 MG capsule Take 20 mg by mouth every morning OTC       pramipexole (MIRAPEX) 0.25 MG tablet [PRAMIPEXOLE (MIRAPEX) 0.25 MG TABLET] Take 1 tablet (0.25 mg total) by mouth 2 (two) times a day. 180 tablet 1     pregabalin (LYRICA) 100 MG capsule Take 1 capsule (100 mg) by mouth daily 30 capsule 0     B complex-vitamin C-folic acid 400  mcg tablet [B COMPLEX-VITAMIN C-FOLIC ACID 400 MCG TABLET] Take 1 tablet by mouth daily. 30 tablet 5     Vitamin D3 (VITAMIN D3) 25 mcg (1000 units) tablet Take 2 tablets by mouth 2 times daily       Past Medical History:   Diagnosis Date     Anxiety      Asthma      Atrial fibrillation (H)      Depression      Hypertension      History reviewed. No pertinent surgical history.  Penicillins  Family History   Problem Relation Age of Onset     Heart Disease Father      Social History     Socioeconomic History     Marital status:      Spouse name: Not on file     Number of children: Not on file     Years of education: Not on file     Highest education level: Not on file   Occupational History     Not on file   Tobacco Use     Smoking status: Former Smoker     Smokeless tobacco: Never Used   Vaping Use     Vaping Use: Never used   Substance and Sexual Activity     Alcohol use: Not on file     Drug use: Not on file     Sexual activity: Yes   Other Topics Concern     Not on file   Social History Narrative    The patient is originally from [place], [place] and went to [employer]. He did not graduate. He has worked as a  for much of his life. 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 Af ter he was released from care home, he looked for work in [place], [place] but could not find any and subsequently returned to [place]. He is currently living at the [facility]. He  in 2004. He has a daughter, Mayra Valdez, who is 23 and  with whom he has no contact.       Social Determinants of Health     Financial Resource Strain:      Difficulty of Paying Living Expenses:    Food Insecurity:      Worried About Running Out of Food in the Last Year:      Ran Out of Food in the Last Year:    Transportation Needs:      Lack of Transportation (Medical):      Lack of Transportation (Non-Medical):    Physical Activity:      Days of Exercise per Week:       Minutes of Exercise per Session:    Stress:      Feeling of Stress :    Social Connections:      Frequency of Communication with Friends and Family:      Frequency of Social Gatherings with Friends and Family:      Attends Scientologist Services:      Active Member of Clubs or Organizations:      Attends Club or Organization Meetings:      Marital Status:    Intimate Partner Violence:      Fear of Current or Ex-Partner:      Emotionally Abused:      Physically Abused:      Sexually Abused:        Review of Systems negative unless noted    Objective:        Vitals:    10/27/21 0854   BP: 138/82   Pulse: 60   Weight: (!) 154.7 kg (341 lb)   Height: 1.829 m (6')     Body mass index is 46.25 kg/m .    Physical Exam:  General Appearance: Alert, pleasant, appears stated age, obese  Head: Normocephalic, without obvious abnormality  Eyes: PERRL, conjunctiva/corneas clear, EOM's intact  Ears: Normal TM's and external ear canals, both ears  Lungs: Clear to auscultation bilaterally, respirations unlabored, no wheezing or crackles  Heart: Regular rate and rhythm, no murmur   Extremities: Extremities with strong and symmetric pulses, no cyanosis, swelling in L knee  Skin: Skin color, texture normal, no rashes or lesions  Neurologic: Grossly normal, no focal deficits

## 2021-10-27 ENCOUNTER — OFFICE VISIT (OUTPATIENT)
Dept: FAMILY MEDICINE | Facility: CLINIC | Age: 51
End: 2021-10-27
Payer: COMMERCIAL

## 2021-10-27 VITALS
SYSTOLIC BLOOD PRESSURE: 138 MMHG | DIASTOLIC BLOOD PRESSURE: 82 MMHG | BODY MASS INDEX: 42.66 KG/M2 | HEIGHT: 72 IN | HEART RATE: 60 BPM | WEIGHT: 315 LBS

## 2021-10-27 DIAGNOSIS — F41.9 ANXIETY: ICD-10-CM

## 2021-10-27 DIAGNOSIS — M79.7 FIBROMYALGIA: Primary | ICD-10-CM

## 2021-10-27 DIAGNOSIS — R79.89 ELEVATED SERUM CREATININE: ICD-10-CM

## 2021-10-27 DIAGNOSIS — I10 ESSENTIAL HYPERTENSION: ICD-10-CM

## 2021-10-27 DIAGNOSIS — F43.10 PTSD (POST-TRAUMATIC STRESS DISORDER): ICD-10-CM

## 2021-10-27 DIAGNOSIS — E66.01 MORBID OBESITY (H): ICD-10-CM

## 2021-10-27 DIAGNOSIS — Z13.1 DIABETES MELLITUS SCREENING: ICD-10-CM

## 2021-10-27 DIAGNOSIS — Z00.00 ROUTINE ADULT HEALTH MAINTENANCE: Primary | ICD-10-CM

## 2021-10-27 DIAGNOSIS — F33.2 SEVERE EPISODE OF RECURRENT MAJOR DEPRESSIVE DISORDER, WITHOUT PSYCHOTIC FEATURES (H): ICD-10-CM

## 2021-10-27 DIAGNOSIS — Z13.220 LIPID SCREENING: ICD-10-CM

## 2021-10-27 DIAGNOSIS — R74.01 ELEVATED TRANSAMINASE LEVEL: ICD-10-CM

## 2021-10-27 LAB
ALBUMIN SERPL-MCNC: 4 G/DL (ref 3.5–5)
ALP SERPL-CCNC: 89 U/L (ref 45–120)
ALT SERPL W P-5'-P-CCNC: 19 U/L (ref 0–45)
ANION GAP SERPL CALCULATED.3IONS-SCNC: 9 MMOL/L (ref 5–18)
AST SERPL W P-5'-P-CCNC: 20 U/L (ref 0–40)
BILIRUB SERPL-MCNC: 0.5 MG/DL (ref 0–1)
BUN SERPL-MCNC: 17 MG/DL (ref 8–22)
CALCIUM SERPL-MCNC: 9.3 MG/DL (ref 8.5–10.5)
CHLORIDE BLD-SCNC: 106 MMOL/L (ref 98–107)
CHOLEST SERPL-MCNC: 187 MG/DL
CO2 SERPL-SCNC: 26 MMOL/L (ref 22–31)
CREAT SERPL-MCNC: 1.16 MG/DL (ref 0.7–1.3)
FASTING STATUS PATIENT QL REPORTED: YES
GFR SERPL CREATININE-BSD FRML MDRD: 72 ML/MIN/1.73M2
GLUCOSE BLD-MCNC: 109 MG/DL (ref 70–125)
HBA1C MFR BLD: 5.3 % (ref 0–5.6)
HDLC SERPL-MCNC: 42 MG/DL
LDLC SERPL CALC-MCNC: 102 MG/DL
POTASSIUM BLD-SCNC: 4.5 MMOL/L (ref 3.5–5)
PROT SERPL-MCNC: 6.7 G/DL (ref 6–8)
SODIUM SERPL-SCNC: 141 MMOL/L (ref 136–145)
TRIGL SERPL-MCNC: 216 MG/DL

## 2021-10-27 PROCEDURE — 80061 LIPID PANEL: CPT | Performed by: FAMILY MEDICINE

## 2021-10-27 PROCEDURE — 83036 HEMOGLOBIN GLYCOSYLATED A1C: CPT | Performed by: FAMILY MEDICINE

## 2021-10-27 PROCEDURE — 80053 COMPREHEN METABOLIC PANEL: CPT | Performed by: FAMILY MEDICINE

## 2021-10-27 PROCEDURE — 36415 COLL VENOUS BLD VENIPUNCTURE: CPT | Performed by: FAMILY MEDICINE

## 2021-10-27 PROCEDURE — 96127 BRIEF EMOTIONAL/BEHAV ASSMT: CPT | Performed by: FAMILY MEDICINE

## 2021-10-27 PROCEDURE — 99396 PREV VISIT EST AGE 40-64: CPT | Performed by: FAMILY MEDICINE

## 2021-10-27 RX ORDER — ASPIRIN 81 MG/1
81 TABLET ORAL DAILY
COMMUNITY
End: 2022-10-17

## 2021-10-27 RX ORDER — VITAMIN B COMPLEX
2 TABLET ORAL 2 TIMES DAILY
Qty: 120 TABLET | Refills: 0 | Status: SHIPPED | OUTPATIENT
Start: 2021-10-27 | End: 2023-04-18

## 2021-10-27 RX ORDER — LISINOPRIL 20 MG/1
20 TABLET ORAL DAILY
Qty: 90 TABLET | Refills: 3 | Status: SHIPPED | OUTPATIENT
Start: 2021-10-27 | End: 2022-09-30

## 2021-10-27 RX ORDER — VITAMIN B COMPLEX
2 TABLET ORAL 2 TIMES DAILY
COMMUNITY
End: 2021-10-27

## 2021-10-27 ASSESSMENT — PATIENT HEALTH QUESTIONNAIRE - PHQ9
SUM OF ALL RESPONSES TO PHQ QUESTIONS 1-9: 18
10. IF YOU CHECKED OFF ANY PROBLEMS, HOW DIFFICULT HAVE THESE PROBLEMS MADE IT FOR YOU TO DO YOUR WORK, TAKE CARE OF THINGS AT HOME, OR GET ALONG WITH OTHER PEOPLE: EXTREMELY DIFFICULT
5. POOR APPETITE OR OVEREATING: NEARLY EVERY DAY
SUM OF ALL RESPONSES TO PHQ QUESTIONS 1-9: 18

## 2021-10-27 ASSESSMENT — ANXIETY QUESTIONNAIRES
3. WORRYING TOO MUCH ABOUT DIFFERENT THINGS: NEARLY EVERY DAY
7. FEELING AFRAID AS IF SOMETHING AWFUL MIGHT HAPPEN: NEARLY EVERY DAY
5. BEING SO RESTLESS THAT IT IS HARD TO SIT STILL: NEARLY EVERY DAY
GAD7 TOTAL SCORE: 21
IF YOU CHECKED OFF ANY PROBLEMS ON THIS QUESTIONNAIRE, HOW DIFFICULT HAVE THESE PROBLEMS MADE IT FOR YOU TO DO YOUR WORK, TAKE CARE OF THINGS AT HOME, OR GET ALONG WITH OTHER PEOPLE: EXTREMELY DIFFICULT
2. NOT BEING ABLE TO STOP OR CONTROL WORRYING: NEARLY EVERY DAY
1. FEELING NERVOUS, ANXIOUS, OR ON EDGE: NEARLY EVERY DAY
6. BECOMING EASILY ANNOYED OR IRRITABLE: NEARLY EVERY DAY

## 2021-10-27 ASSESSMENT — MIFFLIN-ST. JEOR: SCORE: 2434.77

## 2021-10-27 NOTE — PATIENT INSTRUCTIONS
Get shingles vaccine at pharmacy        Below are resources in case you experience an increase in symptoms or feel you are in crisis:     Mental Health Crisis Lines    [facility]:     Adult Crisis Line  ([phone])    Children's Crisis Line ([phone])  [facility]:  Crisis Connection  ([phone])      Urgent Care  ([phone])     [address] [place] [address]       Provides mental health crisis assessments and Rule 25 assessments     Walk-in appointments available     Walk-in Counseling - [facility]   [facility]     ([phone])   [facility]  ([phone])     Acute Psychiatric Emergency / Crisis  If you are having an acute psychiatric emergency, please call 911 or have someone drive you directly to a hospital for further evaluation.    [facility]  [phone]      Other Resources    Crisis Housing [facility]:  [facility]      ([phone])    Poison Control  ([phone])        More Crisis Info     COPE - [phone] ([facility] Mobile Response Team)    [facility] [phone] (Emergency Psychiatric Evaluation)     Acute Psychiatric Services - [facility]  24 hour crisis walk-in and crisis line  [address], [place]  [phone]    Crisis Text Line: Text [place] to [phone]. Free support at your fingertips 24/7  People who text [place] to [phone] will be connected with a counselor. Crisis Text Line is available 24 hours a day, seven days a week.    Or call 911

## 2021-10-27 NOTE — TELEPHONE ENCOUNTER
Received fax request from [facility] pharmacy requesting refill(s) for Vitamin D3    Last refilled on 9/18/2021    Pt last seen on 10/19/2021  Next appt scheduled for 12/27/2021    Will facilitate refill.

## 2021-10-28 ASSESSMENT — ANXIETY QUESTIONNAIRES: GAD7 TOTAL SCORE: 21

## 2021-10-28 ASSESSMENT — PATIENT HEALTH QUESTIONNAIRE - PHQ9: SUM OF ALL RESPONSES TO PHQ QUESTIONS 1-9: 18

## 2021-11-02 ENCOUNTER — ANCILLARY PROCEDURE (OUTPATIENT)
Dept: PALLIATIVE MEDICINE | Facility: OTHER | Age: 51
End: 2021-11-02
Attending: ANESTHESIOLOGY
Payer: COMMERCIAL

## 2021-11-02 VITALS — SYSTOLIC BLOOD PRESSURE: 193 MMHG | HEART RATE: 66 BPM | DIASTOLIC BLOOD PRESSURE: 92 MMHG | TEMPERATURE: 98.4 F

## 2021-11-02 DIAGNOSIS — M54.50 CHRONIC BILATERAL LOW BACK PAIN WITHOUT SCIATICA: ICD-10-CM

## 2021-11-02 DIAGNOSIS — G89.29 CHRONIC BILATERAL LOW BACK PAIN WITHOUT SCIATICA: ICD-10-CM

## 2021-11-02 DIAGNOSIS — M47.816 LUMBAR FACET ARTHROPATHY: Primary | ICD-10-CM

## 2021-11-02 PROCEDURE — 250N000011 HC RX IP 250 OP 636: Performed by: PAIN MEDICINE

## 2021-11-02 PROCEDURE — 64493 INJ PARAVERT F JNT L/S 1 LEV: CPT | Mod: 50 | Performed by: PAIN MEDICINE

## 2021-11-02 PROCEDURE — 250N000009 HC RX 250: Mod: JW | Performed by: PAIN MEDICINE

## 2021-11-02 RX ORDER — LIDOCAINE HYDROCHLORIDE 10 MG/ML
INJECTION, SOLUTION EPIDURAL; INFILTRATION; INTRACAUDAL; PERINEURAL
Status: COMPLETED | OUTPATIENT
Start: 2021-11-02 | End: 2021-11-02

## 2021-11-02 RX ORDER — BUPIVACAINE HYDROCHLORIDE 7.5 MG/ML
INJECTION, SOLUTION EPIDURAL; RETROBULBAR
Status: COMPLETED | OUTPATIENT
Start: 2021-11-02 | End: 2021-11-02

## 2021-11-02 RX ADMIN — BUPIVACAINE HYDROCHLORIDE 2 ML: 7.5 INJECTION, SOLUTION EPIDURAL; RETROBULBAR at 14:06

## 2021-11-02 RX ADMIN — LIDOCAINE HYDROCHLORIDE 3 ML: 10 INJECTION, SOLUTION EPIDURAL; INFILTRATION; INTRACAUDAL; PERINEURAL at 14:05

## 2021-11-02 ASSESSMENT — PAIN SCALES - GENERAL
PAINLEVEL: MILD PAIN (2)
PAINLEVEL: EXTREME PAIN (8)

## 2021-11-02 NOTE — PATIENT INSTRUCTIONS
POST PROCEDURE      INITIAL BILATERAL L4 AND L5  MEDIAL BRANCH BLOCK        Please continue your regular activity and keep track of your pain for the next 5 hours on the pain sheet that is given to you. Please return this to Pain Center tomorrow or asap via fax, mail, or drop off at clinic.    This is a diagnostic test to determine if these are the correct nerves causing your pain.    The objective of this procedure is to have your pain decrease. However it will return again that same day or a few days later. Once your pain returns it may stay the same or get worse.    You may feel some numbness or tingling, which is from the local anesthetic and may last up to several hours.    If pain returns please document on the pain analog sheet when you took your pain medication.    Fever: call if fever 100 or over, redness/warmth/swelling over injection site.    No public hot tubs/pools for a couple of days.    Monitor your response to the injection and report this at your next visit.    IF YOUR PAIN SHEET DOES NOT DEMONSTRATE ENOUGH RELIEF FOR PROCEDURE WE WILL CALL YOU TO HAVE YOU RETURN FOR REPEAT  MEDIAL BRANCH BLOCK. IF YOU HAVE ENOUGH RELIEF WE WILL SUBMIT TO YOUR INSURANCE COMPANY FOR APPROVAL. ONCE APPROVED BY YOUR INSURANCE COMPANY THE STAFF WILL CONTACT YOU TO MAKE APPOINTMENT FOR THE RADIOFREQUENCY.          IF ANY QUESTIONS CALL  PAIN CENTER  [phone]

## 2021-11-02 NOTE — PROGRESS NOTES
Patient here to see Dr Lopez for lumbar medial branch blocks to treat low back pain. Patient is rating his pain an 8/10.

## 2021-11-02 NOTE — LETTER
11/2/2021         RE: Shaun Calvo Jr.  [address]  [facility] [address]        Dear Colleague,    Thank you for referring your patient, Shaun Calvo Jr., to the [facility]. Please see a copy of my visit note below.    Patient here to see Dr Lopez for lumbar medial branch blocks to treat low back pain. Patient is rating his pain an 8/10.      Again, thank you for allowing me to participate in the care of your patient.        Sincerely,        Jamaal RIDLEY MD

## 2021-11-04 ENCOUNTER — TRANSFERRED RECORDS (OUTPATIENT)
Dept: HEALTH INFORMATION MANAGEMENT | Facility: CLINIC | Age: 51
End: 2021-11-04
Payer: COMMERCIAL

## 2021-11-04 DIAGNOSIS — M54.50 CHRONIC BILATERAL LOW BACK PAIN WITHOUT SCIATICA: ICD-10-CM

## 2021-11-04 DIAGNOSIS — G89.29 CHRONIC BILATERAL LOW BACK PAIN WITHOUT SCIATICA: ICD-10-CM

## 2021-11-04 RX ORDER — KETAMINE HCL 100 %
POWDER (GRAM) MISCELLANEOUS
Qty: 60 G | Refills: 2 | Status: SHIPPED | OUTPATIENT
Start: 2021-11-15 | End: 2021-11-23

## 2021-11-11 ENCOUNTER — TELEPHONE (OUTPATIENT)
Dept: PALLIATIVE MEDICINE | Facility: OTHER | Age: 51
End: 2021-11-11
Payer: COMMERCIAL

## 2021-11-11 NOTE — TELEPHONE ENCOUNTER
"Pain analog was returned for initial bilateral L4 and L5 and results were determined \"good\" by Dr. Lopez. Pt will be returning for confirmatory LMBB. Will scan results into pt's record.  "

## 2021-11-15 ENCOUNTER — TELEPHONE (OUTPATIENT)
Dept: PALLIATIVE MEDICINE | Facility: OTHER | Age: 51
End: 2021-11-15
Payer: COMMERCIAL

## 2021-11-23 ENCOUNTER — TELEPHONE (OUTPATIENT)
Dept: PALLIATIVE MEDICINE | Facility: OTHER | Age: 51
End: 2021-11-23

## 2021-11-23 ENCOUNTER — ANCILLARY PROCEDURE (OUTPATIENT)
Dept: PALLIATIVE MEDICINE | Facility: OTHER | Age: 51
End: 2021-11-23
Attending: PAIN MEDICINE
Payer: COMMERCIAL

## 2021-11-23 VITALS
BODY MASS INDEX: 48.17 KG/M2 | OXYGEN SATURATION: 96 % | SYSTOLIC BLOOD PRESSURE: 168 MMHG | WEIGHT: 315 LBS | TEMPERATURE: 96.6 F | DIASTOLIC BLOOD PRESSURE: 99 MMHG | HEART RATE: 70 BPM

## 2021-11-23 DIAGNOSIS — G89.29 CHRONIC BILATERAL LOW BACK PAIN WITHOUT SCIATICA: ICD-10-CM

## 2021-11-23 DIAGNOSIS — M47.816 LUMBAR FACET ARTHROPATHY: ICD-10-CM

## 2021-11-23 DIAGNOSIS — M54.50 CHRONIC BILATERAL LOW BACK PAIN WITHOUT SCIATICA: ICD-10-CM

## 2021-11-23 PROCEDURE — 250N000009 HC RX 250: Performed by: PAIN MEDICINE

## 2021-11-23 PROCEDURE — 64493 INJ PARAVERT F JNT L/S 1 LEV: CPT | Mod: 50 | Performed by: PAIN MEDICINE

## 2021-11-23 RX ORDER — LIDOCAINE HYDROCHLORIDE 10 MG/ML
INJECTION, SOLUTION EPIDURAL; INFILTRATION; INTRACAUDAL; PERINEURAL
Status: COMPLETED | OUTPATIENT
Start: 2021-11-23 | End: 2021-11-23

## 2021-11-23 RX ORDER — LIDOCAINE HYDROCHLORIDE 20 MG/ML
INJECTION, SOLUTION EPIDURAL; INFILTRATION; INTRACAUDAL; PERINEURAL
Status: COMPLETED | OUTPATIENT
Start: 2021-11-23 | End: 2021-11-23

## 2021-11-23 RX ORDER — KETAMINE HCL 100 %
POWDER (GRAM) MISCELLANEOUS
Qty: 60 G | Refills: 2 | Status: SHIPPED | OUTPATIENT
Start: 2021-11-23 | End: 2021-12-27

## 2021-11-23 RX ADMIN — LIDOCAINE HYDROCHLORIDE 2 ML: 20 INJECTION, SOLUTION EPIDURAL; INFILTRATION; INTRACAUDAL; PERINEURAL at 11:41

## 2021-11-23 RX ADMIN — LIDOCAINE HYDROCHLORIDE 5 ML: 10 INJECTION, SOLUTION EPIDURAL; INFILTRATION; INTRACAUDAL; PERINEURAL at 11:40

## 2021-11-23 ASSESSMENT — PAIN SCALES - GENERAL
PAINLEVEL: MILD PAIN (3)
PAINLEVEL: EXTREME PAIN (9)

## 2021-11-23 NOTE — PROGRESS NOTES
Pre-procedure Intake  If YES to any questions or NO to having a   Please complete laminated checklist and leave on the computer keyboard for Provider, verbally inform provider if able.    For SCS Trial, RFA's or any sedation procedure:  Have you been fasting? No     If yes, for how long?     Are you taking any any blood thinners such as Coumadin, Warfarin, Jantoven, Pradaxa Xarelto, Eliquis, Edoxaban, Enoxaparin, Lovenox, Heparin, Arixtra, Fondaparinux, or Fragmin? OR Antiplatelet medication such as Plavix, Brilinta, or Effient?   No     If yes, when did you take your last dose?     Do you take aspirin?  Yes -   ASA  If cervical procedure, have you held aspirin for 6 days?   Yes -     Do you have any allergies to contrast dye, iodine, steroid and/or numbing medications?  NO    Are you currently taking antibiotics or have an active infection?  NO    Have you had a fever/elevated temperature within the past week? NO    Are you currently taking oral steroids? NO    Do you have a ? No     Are you pregnant or breastfeeding?  Not Applicable    Have you received the COVID-19 vaccine? Yes    If yes, was it your 1st, 2nd or only dose needed? 06/12/2021    Date of most recent vaccine:     Notify provider and RNs if systolic BP >170, diastolic BP >100, P >100 or O2 sats < 90%

## 2021-11-23 NOTE — TELEPHONE ENCOUNTER
"Pt is at clinic for LMBB and is requesting increase in Ketamine from 40 mg to 60 mg \"at least until my knee replacement.\" States his refill isn't due until 12/2. Rates left knee pain at 10. Please advise and send back to support pool.  "

## 2021-11-23 NOTE — LETTER
11/23/2021         RE: Shaun Calvo Jr.  [address]  [facility] [address]        Dear Colleague,    Thank you for referring your patient, Shaun Calvo Jr., to the [facility]. Please see a copy of my visit note below.    Pre-procedure Intake  If YES to any questions or NO to having a   Please complete laminated checklist and leave on the computer keyboard for Provider, verbally inform provider if able.    For SCS Trial, RFA's or any sedation procedure:  Have you been fasting? No     If yes, for how long?     Are you taking any any blood thinners such as Coumadin, Warfarin, Jantoven, Pradaxa Xarelto, Eliquis, Edoxaban, Enoxaparin, Lovenox, Heparin, Arixtra, Fondaparinux, or Fragmin? OR Antiplatelet medication such as Plavix, Brilinta, or Effient?   No     If yes, when did you take your last dose?     Do you take aspirin?  Yes -   ASA  If cervical procedure, have you held aspirin for 6 days?   Yes -     Do you have any allergies to contrast dye, iodine, steroid and/or numbing medications?  NO    Are you currently taking antibiotics or have an active infection?  NO    Have you had a fever/elevated temperature within the past week? NO    Are you currently taking oral steroids? NO    Do you have a ? No     Are you pregnant or breastfeeding?  Not Applicable    Have you received the COVID-19 vaccine? Yes    If yes, was it your 1st, 2nd or only dose needed? 06/12/2021    Date of most recent vaccine:     Notify provider and RNs if systolic BP >170, diastolic BP >100, P >100 or O2 sats < 90%            Again, thank you for allowing me to participate in the care of your patient.        Sincerely,        Jamaal RIDLEY MD

## 2021-11-23 NOTE — TELEPHONE ENCOUNTER
Dr Lopez consulted re: Ketamine. OK for pt to increase Ketamine to 60 mg 4 times/day and fill today.

## 2021-11-23 NOTE — PATIENT INSTRUCTIONS
POST PROCEDURE       CONFIRMATORY BILATERAL L4 AND L5  MEDIAL BRANCH BLOCK        Please continue your regular activity and keep track of your pain for the next 5 hours on the pain sheet that is given to you. Please return this to Pain Center tomorrow or asap via fax, mail, or drop off at clinic.    This is a diagnostic test to determine if these are the correct nerves causing your pain.    The objective of this procedure is to have your pain decrease. However it will return again that same day or a few days later. Once your pain returns it may stay the same or get worse.    You may feel some numbness or tingling, which is from the local anesthetic and may last up to several hours.    If pain returns please document on the pain analog sheet when you took your pain medication.    Fever: call if fever 100 or over, redness/warmth/swelling over injection site.    No public hot tubs/pools for a couple of days.    Monitor your response to the injection and report this at your next visit.    IF YOUR PAIN SHEET DOES NOT DEMONSTRATE ENOUGH RELIEF FOR PROCEDURE WE WILL CALL YOU. IF YOU HAVE ENOUGH RELIEF WE WILL SUBMIT TO YOUR INSURANCE COMPANY FOR APPROVAL. ONCE APPROVED BY YOUR INSURANCE COMPANY THE STAFF WILL CONTACT YOU TO MAKE APPOINTMENT FOR THE RADIOFREQUENCY.          IF ANY QUESTIONS CALL  PAIN CENTER  [phone]

## 2021-11-30 ENCOUNTER — TELEPHONE (OUTPATIENT)
Dept: PALLIATIVE MEDICINE | Facility: OTHER | Age: 51
End: 2021-11-30
Payer: COMMERCIAL

## 2021-11-30 ENCOUNTER — TRANSFERRED RECORDS (OUTPATIENT)
Dept: HEALTH INFORMATION MANAGEMENT | Facility: CLINIC | Age: 51
End: 2021-11-30
Payer: COMMERCIAL

## 2021-11-30 DIAGNOSIS — M47.816 LUMBAR FACET ARTHROPATHY: Primary | ICD-10-CM

## 2021-11-30 NOTE — TELEPHONE ENCOUNTER
"Pain analog was returned for confirmatory bilateral L4 and L5 MBB and results were determined \"good\" by Dr. Lopez. Will enter order for Lumbar RF and  PA for it.  "

## 2021-12-14 ENCOUNTER — TELEPHONE (OUTPATIENT)
Dept: PALLIATIVE MEDICINE | Facility: OTHER | Age: 51
End: 2021-12-14
Payer: COMMERCIAL

## 2021-12-15 ENCOUNTER — ANCILLARY PROCEDURE (OUTPATIENT)
Dept: PALLIATIVE MEDICINE | Facility: OTHER | Age: 51
End: 2021-12-15
Attending: PAIN MEDICINE
Payer: COMMERCIAL

## 2021-12-15 VITALS — DIASTOLIC BLOOD PRESSURE: 100 MMHG | OXYGEN SATURATION: 98 % | HEART RATE: 77 BPM | SYSTOLIC BLOOD PRESSURE: 180 MMHG

## 2021-12-15 DIAGNOSIS — M47.816 LUMBAR FACET ARTHROPATHY: ICD-10-CM

## 2021-12-15 PROCEDURE — 64635 DESTROY LUMB/SAC FACET JNT: CPT | Mod: 50 | Performed by: PAIN MEDICINE

## 2021-12-15 PROCEDURE — 250N000009 HC RX 250: Performed by: PAIN MEDICINE

## 2021-12-15 RX ORDER — CHOLECALCIFEROL (VITAMIN D3) 25 MCG
TABLET,CHEWABLE ORAL
COMMUNITY
Start: 2021-06-15 | End: 2021-12-15

## 2021-12-15 RX ORDER — LIDOCAINE HYDROCHLORIDE 10 MG/ML
INJECTION, SOLUTION EPIDURAL; INFILTRATION; INTRACAUDAL; PERINEURAL
Status: COMPLETED | OUTPATIENT
Start: 2021-12-15 | End: 2021-12-15

## 2021-12-15 RX ADMIN — LIDOCAINE HYDROCHLORIDE 30 ML: 10 INJECTION, SOLUTION EPIDURAL; INFILTRATION; INTRACAUDAL; PERINEURAL at 09:31

## 2021-12-15 ASSESSMENT — PAIN SCALES - GENERAL
PAINLEVEL: MILD PAIN (2)
PAINLEVEL: WORST PAIN (10)

## 2021-12-15 NOTE — PROGRESS NOTES
Pre-procedure Intake  If YES to any questions or NO to having a   Please complete laminated checklist and leave on the computer keyboard for Provider, verbally inform provider if able.    For SCS Trial, RFA's or any sedation procedure:  Have you been fasting? No     If yes, for how long?       Are you taking any any blood thinners such as Coumadin, Warfarin, Jantoven, Pradaxa Xarelto, Eliquis, Edoxaban, Enoxaparin, Lovenox, Heparin, Arixtra, Fondaparinux, or Fragmin? OR Antiplatelet medication such as Plavix, Brilinta, or Effient?   No     If yes, when did you take your last dose?     Do you take aspirin?  Yes -   ASA    If cervical procedure, have you held aspirin for 6 days?   No  12/14/21     Do you have any allergies to contrast dye, iodine, steroid and/or numbing medications?  NO    Are you currently taking antibiotics or have an active infection?  NO    Have you had a fever/elevated temperature within the past week? NO    Are you currently taking oral steroids? NO    Do you have a ? No     Are you pregnant or breastfeeding?  Not Applicable    Have you received the COVID-19 vaccine? Yes    If yes, was it your 1st, 2nd or only dose needed? 6/12/2021, 5/15/2021    Date of most recent vaccine: 06/12/2021    Notify provider and RNs if systolic BP >170, diastolic BP >100, P >100 or O2 sats < 90%

## 2021-12-15 NOTE — PATIENT INSTRUCTIONS
[facility] [place]  Bilateral L4 and L5 Radiofrequency Ablation (RFA) Discharge Instructions     Today you saw:    Dr. Lopez      You should anticipate procedural pain for up to 2 weeks.       You may receive a prescription for pain medication and you should take this as directed.       It may take up to 8 weeks to receive relief from the RFA       Follow up with your provider in 2 weeks.           You may resume your normal diet       You may resume your regular medications after the procedure       If you were holding your blood thinning medication, please restart taking it: N/A      Be cautious with walking. Numbness and/or weakness in the lower extremities may occur for up to 6-8 hours due to effect of local anesthetic      Avoid strenuous activity for the first 24 hours       You may resume your regular activities after 24 hours       You may shower, however no swimming, tub baths or hot tubs for 24 hours following your procedure       You may use ice packs 10-15 minutes three to four times a day at the injection site for comfort       Do not use heat to painful areas for 6 to 8 hours. This will give the local anesthetic time to wear off and prevent you from accidentally burning your skin.       Unless you have been directed to avoid the use of anti-inflammatory medications (NSAIDS), you may use medications such as ibuprofen, Aleve or Tylenol for pain control if needed.       If you experience any of the following, call the Pain Clinic at [phone]:   -Fever over 100 degree F    -Swelling, bleeding, redness, drainage, warmth at the injection site    -Progressive weakness or numbness in your legs or arms    -Loss of bowel or bladder function    -Unusual headache that is not relieved by Tylenol    -Unusual new onset of pain that is not improving

## 2021-12-15 NOTE — LETTER
12/15/2021         RE: Shaun Calvo Jr.  [address]  [facility] [address]        Dear Colleague,    Thank you for referring your patient, Shaun Calvo Jr., to the [facility]. Please see a copy of my visit note below.        Pre-procedure Intake  If YES to any questions or NO to having a   Please complete laminated checklist and leave on the computer keyboard for Provider, verbally inform provider if able.    For SCS Trial, RFA's or any sedation procedure:  Have you been fasting? No     If yes, for how long?       Are you taking any any blood thinners such as Coumadin, Warfarin, Jantoven, Pradaxa Xarelto, Eliquis, Edoxaban, Enoxaparin, Lovenox, Heparin, Arixtra, Fondaparinux, or Fragmin? OR Antiplatelet medication such as Plavix, Brilinta, or Effient?   No     If yes, when did you take your last dose?     Do you take aspirin?  Yes -   ASA    If cervical procedure, have you held aspirin for 6 days?   No  12/14/21     Do you have any allergies to contrast dye, iodine, steroid and/or numbing medications?  NO    Are you currently taking antibiotics or have an active infection?  NO    Have you had a fever/elevated temperature within the past week? NO    Are you currently taking oral steroids? NO    Do you have a ? No     Are you pregnant or breastfeeding?  Not Applicable    Have you received the COVID-19 vaccine? Yes    If yes, was it your 1st, 2nd or only dose needed? 6/12/2021, 5/15/2021    Date of most recent vaccine: 06/12/2021    Notify provider and RNs if systolic BP >170, diastolic BP >100, P >100 or O2 sats < 90%            Again, thank you for allowing me to participate in the care of your patient.        Sincerely,        Jamaal RIDLEY MD

## 2021-12-21 ENCOUNTER — TRANSFERRED RECORDS (OUTPATIENT)
Dept: HEALTH INFORMATION MANAGEMENT | Facility: CLINIC | Age: 51
End: 2021-12-21
Payer: COMMERCIAL

## 2021-12-27 ENCOUNTER — OFFICE VISIT (OUTPATIENT)
Dept: PALLIATIVE MEDICINE | Facility: OTHER | Age: 51
End: 2021-12-27
Payer: COMMERCIAL

## 2021-12-27 VITALS — BODY MASS INDEX: 42.66 KG/M2 | HEIGHT: 72 IN | WEIGHT: 315 LBS

## 2021-12-27 DIAGNOSIS — M54.50 CHRONIC BILATERAL LOW BACK PAIN WITHOUT SCIATICA: ICD-10-CM

## 2021-12-27 DIAGNOSIS — G89.4 CHRONIC PAIN SYNDROME: Primary | ICD-10-CM

## 2021-12-27 DIAGNOSIS — M17.12 PRIMARY OSTEOARTHRITIS OF LEFT KNEE: ICD-10-CM

## 2021-12-27 DIAGNOSIS — G89.29 CHRONIC BILATERAL LOW BACK PAIN WITHOUT SCIATICA: ICD-10-CM

## 2021-12-27 LAB
CANNABINOIDS UR QL SCN: ABNORMAL
CREAT UR-MCNC: 135 MG/DL

## 2021-12-27 PROCEDURE — 99213 OFFICE O/P EST LOW 20 MIN: CPT | Performed by: ANESTHESIOLOGY

## 2021-12-27 PROCEDURE — 80307 DRUG TEST PRSMV CHEM ANLYZR: CPT | Performed by: ANESTHESIOLOGY

## 2021-12-27 PROCEDURE — 80320 DRUG SCREEN QUANTALCOHOLS: CPT | Performed by: ANESTHESIOLOGY

## 2021-12-27 PROCEDURE — 80349 CANNABINOIDS NATURAL: CPT | Performed by: ANESTHESIOLOGY

## 2021-12-27 PROCEDURE — G0463 HOSPITAL OUTPT CLINIC VISIT: HCPCS

## 2021-12-27 RX ORDER — KETOROLAC TROMETHAMINE 30 MG/ML
30 INJECTION, SOLUTION INTRAMUSCULAR; INTRAVENOUS EVERY 6 HOURS PRN
Qty: 8 ML | Refills: 3 | Status: SHIPPED | OUTPATIENT
Start: 2021-12-27 | End: 2022-04-25

## 2021-12-27 RX ORDER — KETOROLAC TROMETHAMINE 30 MG/ML
30 INJECTION, SOLUTION INTRAMUSCULAR; INTRAVENOUS EVERY 6 HOURS PRN
COMMUNITY
End: 2021-12-27

## 2021-12-27 RX ORDER — KETAMINE HCL 100 %
POWDER (GRAM) MISCELLANEOUS
Qty: 60 G | Refills: 2 | Status: SHIPPED | OUTPATIENT
Start: 2022-01-21 | End: 2022-04-21

## 2021-12-27 ASSESSMENT — PAIN SCALES - GENERAL: PAINLEVEL: MODERATE PAIN (5)

## 2021-12-27 ASSESSMENT — MIFFLIN-ST. JEOR: SCORE: 2471.05

## 2021-12-27 NOTE — PROGRESS NOTES
"      Tera Sandoval is a 51 year old who presents for the following health issues     Including lumbar degenerative changes, osteoarthritis of the knees.    Reviewing the record he was seen at [facility] 12/20, having aspiration of his left knee    12/15, had radiofrequency ablation L5-S1 bilateral.    HPI     He reviews his family has moved in with his mother-in-law, and the spring there will be looking to buy a house with stepdaughter and family which he is looking forward to.    He continues working 4-hour shifts 5 days a week.    He practices describes also cooking \"nonstop\" for 3 days for his family which she enjoys over the holidays.    He did have a radiofrequency ablation for his back reporting that is done very well.  By the end of the night when he is active he can flareup.  He is aware he could repeat that soon.    Reviews having his left knee aspiration several times in his right knee aspirated.  When he has new insurance next year he will be looking into having a left knee replacement.    He reviews his depression is doing much better over the last week in part related to these interventions.    Continue using ketamine 120 mg lozenge, 1/2 lozenge 4 times a day.  This is allowed him to decrease his Toradol injections also about 2 times a week and uses Naprosyn 1 or 2 times a week.  This regimen has been relatively satisfied.      Current Outpatient Medications:      albuterol (PROAIR HFA;PROVENTIL HFA;VENTOLIN HFA) 90 mcg/actuation inhaler, [ALBUTEROL (PROAIR HFA;PROVENTIL HFA;VENTOLIN HFA) 90 MCG/ACTUATION INHALER] Inhale 2 puffs every 4 (four) hours as needed., Disp: 8.5 g, Rfl: 5     aspirin 81 MG EC tablet, Take 81 mg by mouth daily, Disp: , Rfl:      B complex-vitamin C-folic acid 400 mcg tablet, [B COMPLEX-VITAMIN C-FOLIC ACID 400 MCG TABLET] Take 1 tablet by mouth daily., Disp: 30 tablet, Rfl: 5     cyclobenzaprine (FLEXERIL) 10 MG tablet, TAKE 1 TABLET(10 MG) BY MOUTH THREE TIMES DAILY AS " NEEDED FOR MUSCLE SPASMS, Disp: 60 tablet, Rfl: 0     fish oil-omega-3 fatty acids (FISH OIL) 300-1,000 mg capsule, Take 2 g by mouth daily OTC, Disp: , Rfl:      hydroCHLOROthiazide (HYDRODIURIL) 25 MG tablet, [HYDROCHLOROTHIAZIDE (HYDRODIURIL) 25 MG TABLET] Take 1 tablet (25 mg total) by mouth daily., Disp: 90 tablet, Rfl: 3     hydrOXYzine HCL (ATARAX) 25 MG tablet, [HYDROXYZINE HCL (ATARAX) 25 MG TABLET] TAKE 1 TABLET BY MOUTH THREE TIMES DAILY AS NEEDED FOR ANXIETY(PANIC ATTACK), Disp: 60 tablet, Rfl: 3     [START ON 1/21/2022] ketamine HCl POWD, Ketamine 120 mg one-half under tongue 4 times a day, Disp: 60 g, Rfl: 2     ketorolac (TORADOL) 30 MG/ML injection, Inject 1 mL (30 mg) into the muscle every 6 hours as needed for moderate pain No more than two doses per week, Disp: 8 mL, Rfl: 3     lisinopril (ZESTRIL) 20 MG tablet, Take 1 tablet (20 mg) by mouth daily, Disp: 90 tablet, Rfl: 3     medical cannabis (Patient's own supply), See Admin Instructions (The purpose of this order is to document that the patient reports taking medical cannabis.  This is not a prescription, and is not used to certify that the patient has a qualifying medical condition.) Vaping Oil andFlower leaf; 3 puffs inhaled every 3 hours as needed, Disp: , Rfl:      naproxen (NAPROSYN) 500 MG tablet, Take 1 tablet (500 mg) by mouth 2 times daily (with meals), Disp: 28 tablet, Rfl: 0     omeprazole (PRILOSEC) 20 MG capsule, Take 20 mg by mouth every morning OTC, Disp: , Rfl:      pramipexole (MIRAPEX) 0.25 MG tablet, [PRAMIPEXOLE (MIRAPEX) 0.25 MG TABLET] Take 1 tablet (0.25 mg total) by mouth 2 (two) times a day., Disp: 180 tablet, Rfl: 1     pregabalin (LYRICA) 100 MG capsule, Take 1 capsule (100 mg) by mouth daily, Disp: 30 capsule, Rfl: 0     Vitamin D3 (VITAMIN D3) 25 mcg (1000 units) tablet, Take 2 tablets (50 mcg) by mouth 2 times daily, Disp: 120 tablet, Rfl: 0  Is alert with a clear sensorium good eye contact.  Thought process  logical.  Affect is fairly full range    Review of Systems         Objective    Ht 1.829 m (6')   Wt (!) 158.3 kg (349 lb)   BMI 47.33 kg/m    Body mass index is 47.33 kg/m .  Physical Exam         Total time more than 20 minutes

## 2021-12-27 NOTE — LETTER
Opioid / Opioid Plus Controlled Substance Agreement    This is an agreement between you and your provider about the safe and appropriate use of controlled substance/opioids prescribed by your care team. Controlled substances are medicines that can cause physical and mental dependence (abuse).    There are strict laws about having and using these medicines. We here at [facility] are committing to working with you in your efforts to get better. To support you in this work, we ll help you schedule regular office appointments for medicine refills. If we must cancel or change your appointment for any reason, we ll make sure you have enough medicine to last until your next appointment.     As a Provider, I will:    Listen carefully to your concerns and treat you with respect.     Recommend a treatment plan that I believe is in your best interest. This plan may involve therapies other than opioid pain medication.     Talk with you often about the possible benefits, and the risk of harm of any medicine that we prescribe for you.     Provide a plan on how to taper (discontinue or go off) using this medicine if the decision is made to stop its use.    As a Patient, I understand that opioid(s):     Are a controlled substance prescribed by my care team to help me function or work and manage my condition(s).     Are strong medicines and can cause serious side effects such as:    Drowsiness, which can seriously affect my driving ability    A lower breathing rate, enough to cause death    Harm to my thinking ability     Depression     Abuse of and addiction to this medicine    Need to be taken exactly as prescribed. Combining opioids with certain medicines or chemicals (such as illegal drugs, sedatives, sleeping pills, and benzodiazepines) can be dangerous or even fatal. If I stop opioids suddenly, I may have severe withdrawal symptoms.    Do not work for all types of pain nor for all patients. If they re not helpful, I may  be asked to stop them.        The risks, benefits and side effects of these medicine(s) were explained to me. I agree that:  1. I will take part in other treatments as advised by my care team. This may be psychiatry or counseling, physical therapy, behavioral therapy, group treatment or a referral to a specialist.     2. I will keep all my appointments. I understand that this is part of the monitoring of opioids. My care team may require an office visit for EVERY opioid/controlled substance refill. If I miss appointments or don t follow instructions, my care team may stop my medicine.    3. I will take my medicines as prescribed. I will not change the dose or schedule unless my care team tells me to. There will be no refills if I run out early.     4. I may be asked to come to the clinic and complete a urine drug test or complete a pill count at any time. If I don t give a urine sample or participate in a pill count, the care team may stop my medicine.    5. I will only receive prescriptions from this clinic for chronic pain. If I am treated by another provider for acute pain issues, I will tell them that I am taking opioid pain medication for chronic pain and that I have a treatment agreement with this provider. I will inform my [facility] care team within one business day if I am given a prescription for any pain medication by another healthcare provider. My [facility] care team can contact other providers and pharmacists about my use of any medicines.    6. It is up to me to make sure that I don t run out of my medicines on weekends or holidays. If my care team is willing to refill my opioid prescription without a visit, I must request refills only during office hours. Refills may take up to 3 business days to process. I will use one pharmacy to fill all my opioid and other controlled substance prescriptions. I will notify the clinic about any changes to my insurance or medication  availability.    7. I am responsible for my prescriptions. If the medicine/prescription is lost, stolen or destroyed, it will not be replaced. I also agree not to share controlled substance medicines with anyone.    8. I am aware I should not use any illegal or recreational drugs. I agree not to drink alcohol unless my care team says I can.       9. If I enroll in the [place] [facility] Cannabis program, I will tell my care team prior to my next refill.     10. I will tell my care team right away if I become pregnant, have a new medical problem treated outside of my regular clinic, or have a change in my medications.    11. I understand that this medicine can affect my thinking, judgment and reaction time. Alcohol and drugs affect the brain and body, which can affect the safety of my driving. Being under the influence of alcohol or drugs can affect my decision-making, behaviors, personal safety, and the safety of others. Driving while impaired (DWI) can occur if a person is driving, operating, or in physical control of a car, motorcycle, boat, snowmobile, ATV, motorbike, off-road vehicle, or any other motor vehicle ([place] Statute 169A.20). I understand the risk if I choose to drive or operate any vehicle or machinery.    I understand that if I do not follow any of the conditions above, my prescriptions or treatment may be stopped or changed.          Opioids  What You Need to Know    What are opioids?   Opioids are pain medicines that must be prescribed by a doctor. They are also known as narcotics.     Examples are:   1. morphine (MS Contin, Kenyatta)  2. oxycodone (Oxycontin)  3. oxycodone and acetaminophen (Percocet)  4. hydrocodone and acetaminophen (Vicodin, Norco)   5. fentanyl patch (Duragesic)   6. hydromorphone (Dilaudid)   7. methadone  8. codeine (Tylenol #3)     What do opioids do well?   Opioids are best for severe short-term pain such as after a surgery or injury. They may work well for cancer pain. They may  help some people with long-lasting (chronic) pain.     What do opioids NOT do well?   Opioids never get rid of pain entirely, and they don t work well for most patients with chronic pain. Opioids don t reduce swelling, one of the causes of pain.                                    Other ways to manage chronic pain and improve function include:       Treat the health problem that may be causing pain    Anti-inflammation medicines, which reduce swelling and tenderness, such as ibuprofen (Advil, Motrin) or naproxen (Aleve)    Acetaminophen (Tylenol)    Antidepressants and anti-seizure medicines, especially for nerve pain    Topical treatments such as patches or creams    Injections or nerve blocks    Chiropractic or osteopathic treatment    Acupuncture, massage, deep breathing, meditation, visual imagery, aromatherapy    Use heat or ice at the pain site    Physical therapy     Exercise    Stop smoking    Take part in therapy       Risks and side effects     Talk to your doctor before you start or decide to keep taking opioids. Possible side effects include:      Lowering your breathing rate enough to cause death    Overdose, including death, especially if taking higher than prescribed doses    Worse depression symptoms; less pleasure in things you usually enjoy    Feeling tired or sluggish    Slower thoughts or cloudy thinking    Being more sensitive to pain over time; pain is harder to control    Trouble sleeping or restless sleep    Changes in hormone levels (for example, less testosterone)    Changes in sex drive or ability to have sex    Constipation    Unsafe driving    Itching and sweating    Dizziness    Nausea, throwing up and dry mouth    What else should I know about opioids?    Opioids may lead to dependence, tolerance, or addiction.      Dependence means that if you stop or reduce the medicine too quickly, you will have withdrawal symptoms. These include loose poop (diarrhea), jitters, flu-like symptoms,  nervousness and tremors. Dependence is not the same as addiction.                       Tolerance means needing higher doses over time to get the same effect. This may increase the chance of serious side effects.      Addiction is when people improperly use a substance that harms their body, their mind or their relations with others. Use of opiates can cause a relapse of addiction if you have a history of drug or alcohol abuse.      People who have used opioids for a long time may have a lower quality of life, worse depression, higher levels of pain and more visits to doctors.    You can overdose on opioids. Take these steps to lower your risk of overdose:    1. Recognize the signs:  Signs of overdose include decrease or loss of consciousness (blackout), slowed breathing, trouble waking up and blue lips. If someone is worried about overdose, they should call 911.    2. Talk to your doctor about Narcan (naloxone).   If you are at risk for overdose, you may be given a prescription for Narcan. This medicine very quickly reverses the effects of opioids.   If you overdose, a friend or family member can give you Narcan while waiting for the ambulance. They need to know the signs of overdose and how to give Narcan.     3. Don't use alcohol or street drugs.   Taking them with opioids can cause death.    4. Do not take any of these medicines unless your doctor says it s OK. Taking these with opioids can cause death:    Benzodiazepines, such as lorazepam (Ativan), alprazolam (Xanax) or diazepam (Valium)    Muscle relaxers, such as cyclobenzaprine (Flexeril)    Sleeping pills like zolpidem (Ambien)     Other opioids      How to keep you and other people safe while taking opioids:    1. Never share your opioids with others.  Opioid medicines are regulated by the Drug Enforcement Agency ([employer]IL[employer]). Selling or sharing medications is a criminal act.    2. Be sure to store opioids in a secure place, locked up if possible. Young children  can easily swallow them and overdose.    3. When you are traveling with your medicines, keep them in the original bottles. If you use a pill box, be sure you also carry a copy of your medicine list from your clinic or pharmacy.    4. Safe disposal of opioids    Most pharmacies have places to get rid of medicine, called disposal kiosks. Medicine disposal options are also available in every [facility]. Search your county and  medication disposal  to find more options. You can find more details at:  [email or web address]-[email or web address]     I agree that my provider, clinic care team, and pharmacy may work with any city, state or federal law enforcement agency that investigates the misuse, sale, or other diversion of my controlled medicine. I will allow my provider to discuss my care with, or share a copy of, this agreement with any other treating provider, pharmacy or emergency room where I receive care.    I have read this agreement and have asked questions about anything I did not understand.    _______________________________________________________  Patient Signature - Shaun Calvo Jr. _____________________                   Date     _______________________________________________________  Provider Signature - CHITO HARKINS MD   _____________________                   Date     _______________________________________________________  Witness Signature (required if provider not present while patient signing)   _____________________                   Date

## 2021-12-27 NOTE — LETTER
12/27/2021         RE: Shaun Calvo Jr.  [address]  [facility] [address]        Dear Colleague,    Thank you for referring your patient, Shaun Calvo Jr., to the [facility]. Please see a copy of my visit note below.      PEG Score 12/27/2021   PEG Total Score 7.33     [facility] Number:  [phone]    Call with any questions about your care and for scheduling assistance.     Calls are returned Monday through Friday between 8 AM and 4:30 PM. We usually get back to you within 2 business days depending on the issue/request.    If we are prescribing your medications:    For opioid medication refills, call the clinic or send a OfficeDrop message 7 days in advance.  Please include:    Name of requested medication    Name of the pharmacy.    For non-opioid medications, call your pharmacy directly to request a refill. Please allow 3-4 days to be processed.     Per [place] State Law:    All controlled substance prescriptions must be filled within 30 days of being written.      For those controlled substances allowing refills, pickup must occur within 30 days of last fill.      We believe regular attendance is key to your success in our program!      Any time you are unable to keep your appointment we ask that you call us at least 24 hours in advance to cancel.This will allow us to offer the appointment time to another patient.     Multiple missed appointments may lead to dismissal from the clinic.          Tera Sandoval is a 51 year old who presents for the following health issues     Including lumbar degenerative changes, osteoarthritis of the knees.    Reviewing the record he was seen at [facility] 12/20, having aspiration of his left knee    12/15, had radiofrequency ablation L5-S1 bilateral.    HPI     He reviews his family has moved in with his mother-in-law, and the spring there will be looking to buy a house with stepdaughter and  "family which he is looking forward to.    He continues working 4-hour shifts 5 days a week.    He practices describes also cooking \"nonstop\" for 3 days for his family which she enjoys over the holidays.    He did have a radiofrequency ablation for his back reporting that is done very well.  By the end of the night when he is active he can flareup.  He is aware he could repeat that soon.    Reviews having his left knee aspiration several times in his right knee aspirated.  When he has new insurance next year he will be looking into having a left knee replacement.    He reviews his depression is doing much better over the last week in part related to these interventions.    Continue using ketamine 120 mg lozenge, 1/2 lozenge 4 times a day.  This is allowed him to decrease his Toradol injections also about 2 times a week and uses Naprosyn 1 or 2 times a week.  This regimen has been relatively satisfied.      Current Outpatient Medications:      albuterol (PROAIR HFA;PROVENTIL HFA;VENTOLIN HFA) 90 mcg/actuation inhaler, [ALBUTEROL (PROAIR HFA;PROVENTIL HFA;VENTOLIN HFA) 90 MCG/ACTUATION INHALER] Inhale 2 puffs every 4 (four) hours as needed., Disp: 8.5 g, Rfl: 5     aspirin 81 MG EC tablet, Take 81 mg by mouth daily, Disp: , Rfl:      B complex-vitamin C-folic acid 400 mcg tablet, [B COMPLEX-VITAMIN C-FOLIC ACID 400 MCG TABLET] Take 1 tablet by mouth daily., Disp: 30 tablet, Rfl: 5     cyclobenzaprine (FLEXERIL) 10 MG tablet, TAKE 1 TABLET(10 MG) BY MOUTH THREE TIMES DAILY AS NEEDED FOR MUSCLE SPASMS, Disp: 60 tablet, Rfl: 0     fish oil-omega-3 fatty acids (FISH OIL) 300-1,000 mg capsule, Take 2 g by mouth daily OTC, Disp: , Rfl:      hydroCHLOROthiazide (HYDRODIURIL) 25 MG tablet, [HYDROCHLOROTHIAZIDE (HYDRODIURIL) 25 MG TABLET] Take 1 tablet (25 mg total) by mouth daily., Disp: 90 tablet, Rfl: 3     hydrOXYzine HCL (ATARAX) 25 MG tablet, [HYDROXYZINE HCL (ATARAX) 25 MG TABLET] TAKE 1 TABLET BY MOUTH THREE TIMES DAILY " AS NEEDED FOR ANXIETY(PANIC ATTACK), Disp: 60 tablet, Rfl: 3     [START ON 1/21/2022] ketamine HCl POWD, Ketamine 120 mg one-half under tongue 4 times a day, Disp: 60 g, Rfl: 2     ketorolac (TORADOL) 30 MG/ML injection, Inject 1 mL (30 mg) into the muscle every 6 hours as needed for moderate pain No more than two doses per week, Disp: 8 mL, Rfl: 3     lisinopril (ZESTRIL) 20 MG tablet, Take 1 tablet (20 mg) by mouth daily, Disp: 90 tablet, Rfl: 3     medical cannabis (Patient's own supply), See Admin Instructions (The purpose of this order is to document that the patient reports taking medical cannabis.  This is not a prescription, and is not used to certify that the patient has a qualifying medical condition.) Vaping Oil andFlower leaf; 3 puffs inhaled every 3 hours as needed, Disp: , Rfl:      naproxen (NAPROSYN) 500 MG tablet, Take 1 tablet (500 mg) by mouth 2 times daily (with meals), Disp: 28 tablet, Rfl: 0     omeprazole (PRILOSEC) 20 MG capsule, Take 20 mg by mouth every morning OTC, Disp: , Rfl:      pramipexole (MIRAPEX) 0.25 MG tablet, [PRAMIPEXOLE (MIRAPEX) 0.25 MG TABLET] Take 1 tablet (0.25 mg total) by mouth 2 (two) times a day., Disp: 180 tablet, Rfl: 1     pregabalin (LYRICA) 100 MG capsule, Take 1 capsule (100 mg) by mouth daily, Disp: 30 capsule, Rfl: 0     Vitamin D3 (VITAMIN D3) 25 mcg (1000 units) tablet, Take 2 tablets (50 mcg) by mouth 2 times daily, Disp: 120 tablet, Rfl: 0  Is alert with a clear sensorium good eye contact.  Thought process logical.  Affect is fairly full range    Review of Systems         Objective    Ht 1.829 m (6')   Wt (!) 158.3 kg (349 lb)   BMI 47.33 kg/m    Body mass index is 47.33 kg/m .  Physical Exam         Total time more than 20 minutes            12/27/21 1105   PEG: A Thee-Item Scale Assessing Pain Intensity and Interference        0 = No pain / No interference    10 = Pain as bad as you can imagine / Completely interferes   What number best describes your  pain on average in the past week? 7   What number best describes how, during the past week, pain has interfered with your enjoyment of life? 8   What number best describes how, during the past week, pain has interfered with your general activity? 7   PEG Total Score 7.33       Again, thank you for allowing me to participate in the care of your patient.        Sincerely,        CHITO HARKINS MD

## 2021-12-27 NOTE — PATIENT INSTRUCTIONS
PLAN:   Continue the ketamine 120 mg lozenges, 1/2 lozenge 4 times a day.    Ketorolac (Toradol) injections, no more than 2 injections/week.    You are working with orthopedics for knee injections and may pursue knee surgery.    Continue with Dr. Lopez for radiofrequency ablation of your back.    Follow-up with Dr. Nagel in 4 months

## 2021-12-27 NOTE — PROGRESS NOTES
12/27/21 1105   PEG: A Thee-Item Scale Assessing Pain Intensity and Interference        0 = No pain / No interference    10 = Pain as bad as you can imagine / Completely interferes   What number best describes your pain on average in the past week? 7   What number best describes how, during the past week, pain has interfered with your enjoyment of life? 8   What number best describes how, during the past week, pain has interfered with your general activity? 7   PEG Total Score 7.33

## 2021-12-27 NOTE — PROGRESS NOTES
PEG Score 12/27/2021   PEG Total Score 7.33     [facility] Number:  [phone]    Call with any questions about your care and for scheduling assistance.     Calls are returned Monday through Friday between 8 AM and 4:30 PM. We usually get back to you within 2 business days depending on the issue/request.    If we are prescribing your medications:    For opioid medication refills, call the clinic or send a iSirona message 7 days in advance.  Please include:    Name of requested medication    Name of the pharmacy.    For non-opioid medications, call your pharmacy directly to request a refill. Please allow 3-4 days to be processed.     Per [place] State Law:    All controlled substance prescriptions must be filled within 30 days of being written.      For those controlled substances allowing refills, pickup must occur within 30 days of last fill.      We believe regular attendance is key to your success in our program!      Any time you are unable to keep your appointment we ask that you call us at least 24 hours in advance to cancel.This will allow us to offer the appointment time to another patient.     Multiple missed appointments may lead to dismissal from the clinic.

## 2021-12-28 LAB
BARBITURATES UR QL: NORMAL
ETHANOL UR QL SCN: NORMAL

## 2021-12-29 ENCOUNTER — OFFICE VISIT (OUTPATIENT)
Dept: PALLIATIVE MEDICINE | Facility: OTHER | Age: 51
End: 2021-12-29
Payer: COMMERCIAL

## 2021-12-29 VITALS — SYSTOLIC BLOOD PRESSURE: 188 MMHG | HEART RATE: 76 BPM | OXYGEN SATURATION: 99 % | DIASTOLIC BLOOD PRESSURE: 102 MMHG

## 2021-12-29 DIAGNOSIS — M79.18 MYOFASCIAL PAIN SYNDROME: Primary | ICD-10-CM

## 2021-12-29 PROCEDURE — 20552 NJX 1/MLT TRIGGER POINT 1/2: CPT | Performed by: PAIN MEDICINE

## 2021-12-29 PROCEDURE — 96372 THER/PROPH/DIAG INJ SC/IM: CPT | Performed by: PAIN MEDICINE

## 2021-12-29 PROCEDURE — 250N000011 HC RX IP 250 OP 636: Performed by: PAIN MEDICINE

## 2021-12-29 RX ORDER — BUPIVACAINE HYDROCHLORIDE 2.5 MG/ML
10 INJECTION, SOLUTION EPIDURAL; INFILTRATION; INTRACAUDAL ONCE
Status: COMPLETED | OUTPATIENT
Start: 2021-12-29 | End: 2021-12-29

## 2021-12-29 RX ORDER — DEXAMETHASONE SODIUM PHOSPHATE 10 MG/ML
10 INJECTION, SOLUTION INTRAMUSCULAR; INTRAVENOUS ONCE
Status: COMPLETED | OUTPATIENT
Start: 2021-12-29 | End: 2021-12-29

## 2021-12-29 RX ADMIN — BUPIVACAINE HYDROCHLORIDE 25 MG: 2.5 INJECTION, SOLUTION EPIDURAL; INFILTRATION; INTRACAUDAL; PERINEURAL at 12:02

## 2021-12-29 RX ADMIN — DEXAMETHASONE SODIUM PHOSPHATE 10 MG: 10 INJECTION INTRAMUSCULAR; INTRAVENOUS at 12:04

## 2021-12-29 ASSESSMENT — PAIN SCALES - GENERAL: PAINLEVEL: MODERATE PAIN (5)

## 2021-12-29 NOTE — PROGRESS NOTES
Pre-procedure Intake  If YES to any questions or NO to having a   Please complete laminated checklist and leave on the computer keyboard for Provider, verbally inform provider if able.    For SCS Trial, RFA's or any sedation procedure:  Have you been fasting? NA    If yes, for how long?     Are you taking any any blood thinners such as Coumadin, Warfarin, Jantoven, Pradaxa Xarelto, Eliquis, Edoxaban, Enoxaparin, Lovenox, Heparin, Arixtra, Fondaparinux, or Fragmin? OR Antiplatelet medication such as Plavix, Brilinta, or Effient?   No     If yes, when did you take your last dose?     Do you take aspirin?  Yes -   ASA    If cervical procedure, have you held aspirin for 6 days?   NA  Taken 5 days ago- Low dose 81 mg    Do you have any allergies to contrast dye, iodine, steroid and/or numbing medications?  NO    Are you currently taking antibiotics or have an active infection?  NO    Have you had a fever/elevated temperature within the past week? NO    Are you currently taking oral steroids? NO    Do you have a ? NA    Are you pregnant or breastfeeding?  Not Applicable    Have you received the COVID-19 vaccine? Yes    If yes, was it your 1st, 2nd or only dose needed? 2nd dose    Date of most recent vaccine: 06/12/2021    Notify provider and RNs if systolic BP >170, diastolic BP >100, P >100 or O2 sats < 90%    Leana Patricio MA  [facility]

## 2021-12-29 NOTE — PATIENT INSTRUCTIONS
[facility]  Post Procedure Instructions    Today you had:  trigger point injections                                Medications used: Dexamthaspme  bupivicaine          Go to the emergency room if you develop any shortness of breath    Monitor the injection sites for signs and symptoms of infection-fever, chills, redness, swelling, warmth, or drainage to areas.    You may have soreness at injection sites for up to 24 hours.    You may apply ice to the painful areas to help minimize the discomfort of the needle pokes.    Do not apply heat to sites for at least 12 hours.    You may use anti-inflammatory medications or Tylenol for pain control if necessary    Pain Clinic phone number:  [phone]

## 2021-12-29 NOTE — LETTER
12/29/2021         RE: Shaun Calvo Jr.  [address] [place] [address]        Dear Colleague,    Thank you for referring your patient, Shaun Calvo Jr., to the [facility]. Please see a copy of my visit note below.    Pre-procedure Intake  If YES to any questions or NO to having a   Please complete laminated checklist and leave on the computer keyboard for Provider, verbally inform provider if able.    For SCS Trial, RFA's or any sedation procedure:  Have you been fasting? NA    If yes, for how long?     Are you taking any any blood thinners such as Coumadin, Warfarin, Jantoven, Pradaxa Xarelto, Eliquis, Edoxaban, Enoxaparin, Lovenox, Heparin, Arixtra, Fondaparinux, or Fragmin? OR Antiplatelet medication such as Plavix, Brilinta, or Effient?   No     If yes, when did you take your last dose?     Do you take aspirin?  Yes -   ASA    If cervical procedure, have you held aspirin for 6 days?   NA  Taken 5 days ago- Low dose 81 mg    Do you have any allergies to contrast dye, iodine, steroid and/or numbing medications?  NO    Are you currently taking antibiotics or have an active infection?  NO    Have you had a fever/elevated temperature within the past week? NO    Are you currently taking oral steroids? NO    Do you have a ? NA    Are you pregnant or breastfeeding?  Not Applicable    Have you received the COVID-19 vaccine? Yes    If yes, was it your 1st, 2nd or only dose needed? 2nd dose    Date of most recent vaccine: 06/12/2021    Notify provider and RNs if systolic BP >170, diastolic BP >100, P >100 or O2 sats < 90%    Leana Patricio MA  [facility]      Again, thank you for allowing me to participate in the care of your patient.        Sincerely,        Jamaal RIDLEY MD

## 2021-12-29 NOTE — PROCEDURES
TRIGGER POINT INJECTION  Performed on: 12/29/2021    Mr. Calvo is a 51-year-old man who had a lumbar radiofrequency ablation procedure done about 2 weeks ago.  He is having some discomfort in the lumbosacral area.  This appears to be myofascial pain and we will give a trial to trigger point injections.    Pre Procedure Diagnosis:  Myofascial pain  Vital Signs:  As per intra-procedure documentation    The procedure was discussed with Shaun Calvo Jr. in detail along with the attendant risks, including but not limited to: nerve injury, infection, bleeding, allergic reaction, or worsening of pain.  Informed consent was obtained and patient elected to proceed.    After informed consent was obtained the patient was seated in the examination chair.  The lumbar area was prepped sterilely with alcohol.  A 3-1/2 inch number 25-gauge needle was used.  Injections were made in the lumbosacral paraspinal muscles bilaterally.  A total of 10 mL of 0.25% Marcaine with 10 mg of Decadron was used in divided doses.    The patient tolerated the procedure well.  There were no complications.      If Shaun Calvo Jr. has any questions or concerns after discharge, he was instructed to

## 2021-12-30 LAB — KETAMINE UR QL CFM: PRESENT

## 2021-12-31 LAB
CANNABINOIDS UR CFM-MCNC: 692 NG/ML
CARBOXYTHC/CREAT UR: 513 NG/MG CREAT

## 2022-01-01 ENCOUNTER — HOSPITAL ENCOUNTER (EMERGENCY)
Facility: HOSPITAL | Age: 52
Discharge: HOME | End: 2022-01-01
Attending: EMERGENCY MEDICINE | Admitting: EMERGENCY MEDICINE
Payer: COMMERCIAL

## 2022-01-01 ENCOUNTER — APPOINTMENT (OUTPATIENT)
Dept: CT IMAGING | Facility: HOSPITAL | Age: 52
End: 2022-01-01
Attending: EMERGENCY MEDICINE
Payer: COMMERCIAL

## 2022-01-01 VITALS
WEIGHT: 315 LBS | SYSTOLIC BLOOD PRESSURE: 177 MMHG | OXYGEN SATURATION: 100 % | TEMPERATURE: 98.2 F | BODY MASS INDEX: 47.33 KG/M2 | HEART RATE: 70 BPM | DIASTOLIC BLOOD PRESSURE: 97 MMHG | RESPIRATION RATE: 19 BRPM

## 2022-01-01 DIAGNOSIS — K29.00 ACUTE GASTRITIS WITHOUT HEMORRHAGE, UNSPECIFIED GASTRITIS TYPE: ICD-10-CM

## 2022-01-01 DIAGNOSIS — I77.89 ENLARGEMENT OF AORTIC ROOT (H): ICD-10-CM

## 2022-01-01 LAB
ALBUMIN SERPL-MCNC: 4.1 G/DL (ref 3.5–5)
ALP SERPL-CCNC: 73 U/L (ref 45–120)
ALT SERPL W P-5'-P-CCNC: 28 U/L (ref 0–45)
ANION GAP SERPL CALCULATED.3IONS-SCNC: 9 MMOL/L (ref 5–18)
AST SERPL W P-5'-P-CCNC: 22 U/L (ref 0–40)
BASOPHILS # BLD AUTO: 0 10E3/UL (ref 0–0.2)
BASOPHILS NFR BLD AUTO: 1 %
BILIRUB SERPL-MCNC: 0.4 MG/DL (ref 0–1)
BUN SERPL-MCNC: 19 MG/DL (ref 8–22)
CALCIUM SERPL-MCNC: 9.1 MG/DL (ref 8.5–10.5)
CHLORIDE BLD-SCNC: 102 MMOL/L (ref 98–107)
CO2 SERPL-SCNC: 29 MMOL/L (ref 22–31)
CREAT SERPL-MCNC: 1.2 MG/DL (ref 0.7–1.3)
EOSINOPHIL # BLD AUTO: 0.2 10E3/UL (ref 0–0.7)
EOSINOPHIL NFR BLD AUTO: 3 %
ERYTHROCYTE [DISTWIDTH] IN BLOOD BY AUTOMATED COUNT: 12.8 % (ref 10–15)
GFR SERPL CREATININE-BSD FRML MDRD: 73 ML/MIN/1.73M2
GLUCOSE BLD-MCNC: 96 MG/DL (ref 70–125)
HCT VFR BLD AUTO: 45.1 % (ref 40–53)
HGB BLD-MCNC: 14.9 G/DL (ref 13.3–17.7)
IMM GRANULOCYTES # BLD: 0 10E3/UL
IMM GRANULOCYTES NFR BLD: 0 %
LIPASE SERPL-CCNC: 31 U/L (ref 0–52)
LYMPHOCYTES # BLD AUTO: 1.7 10E3/UL (ref 0.8–5.3)
LYMPHOCYTES NFR BLD AUTO: 23 %
MCH RBC QN AUTO: 28.3 PG (ref 26.5–33)
MCHC RBC AUTO-ENTMCNC: 33 G/DL (ref 31.5–36.5)
MCV RBC AUTO: 86 FL (ref 78–100)
MONOCYTES # BLD AUTO: 0.9 10E3/UL (ref 0–1.3)
MONOCYTES NFR BLD AUTO: 12 %
NEUTROPHILS # BLD AUTO: 4.5 10E3/UL (ref 1.6–8.3)
NEUTROPHILS NFR BLD AUTO: 61 %
NRBC # BLD AUTO: 0 10E3/UL
NRBC BLD AUTO-RTO: 0 /100
PLATELET # BLD AUTO: 188 10E3/UL (ref 150–450)
POTASSIUM BLD-SCNC: 4 MMOL/L (ref 3.5–5)
PROT SERPL-MCNC: 6.6 G/DL (ref 6–8)
RBC # BLD AUTO: 5.27 10E6/UL (ref 4.4–5.9)
SODIUM SERPL-SCNC: 140 MMOL/L (ref 136–145)
TROPONIN I SERPL-MCNC: 0.03 NG/ML (ref 0–0.29)
WBC # BLD AUTO: 7.3 10E3/UL (ref 4–11)

## 2022-01-01 PROCEDURE — 71275 CT ANGIOGRAPHY CHEST: CPT

## 2022-01-01 PROCEDURE — 250N000009 HC RX 250: Performed by: EMERGENCY MEDICINE

## 2022-01-01 PROCEDURE — 74174 CTA ABD&PLVS W/CONTRAST: CPT

## 2022-01-01 PROCEDURE — 84484 ASSAY OF TROPONIN QUANT: CPT | Performed by: EMERGENCY MEDICINE

## 2022-01-01 PROCEDURE — 250N000011 HC RX IP 250 OP 636: Performed by: EMERGENCY MEDICINE

## 2022-01-01 PROCEDURE — 85014 HEMATOCRIT: CPT | Performed by: EMERGENCY MEDICINE

## 2022-01-01 PROCEDURE — 96374 THER/PROPH/DIAG INJ IV PUSH: CPT | Mod: 59

## 2022-01-01 PROCEDURE — 250N000013 HC RX MED GY IP 250 OP 250 PS 637: Performed by: EMERGENCY MEDICINE

## 2022-01-01 PROCEDURE — 99285 EMERGENCY DEPT VISIT HI MDM: CPT | Mod: 25

## 2022-01-01 PROCEDURE — 93005 ELECTROCARDIOGRAM TRACING: CPT | Performed by: EMERGENCY MEDICINE

## 2022-01-01 PROCEDURE — 36415 COLL VENOUS BLD VENIPUNCTURE: CPT | Performed by: EMERGENCY MEDICINE

## 2022-01-01 PROCEDURE — 83690 ASSAY OF LIPASE: CPT | Performed by: EMERGENCY MEDICINE

## 2022-01-01 PROCEDURE — 80053 COMPREHEN METABOLIC PANEL: CPT | Performed by: EMERGENCY MEDICINE

## 2022-01-01 RX ORDER — KETOROLAC TROMETHAMINE 30 MG/ML
15 INJECTION, SOLUTION INTRAMUSCULAR; INTRAVENOUS ONCE
Status: COMPLETED | OUTPATIENT
Start: 2022-01-01 | End: 2022-01-01

## 2022-01-01 RX ORDER — IOPAMIDOL 755 MG/ML
100 INJECTION, SOLUTION INTRAVASCULAR ONCE
Status: COMPLETED | OUTPATIENT
Start: 2022-01-01 | End: 2022-01-01

## 2022-01-01 RX ORDER — SUCRALFATE ORAL 1 G/10ML
1 SUSPENSION ORAL 2 TIMES DAILY
Qty: 420 ML | Refills: 0 | Status: SHIPPED | OUTPATIENT
Start: 2022-01-01 | End: 2022-04-25

## 2022-01-01 RX ORDER — FAMOTIDINE 20 MG/1
20 TABLET, FILM COATED ORAL 2 TIMES DAILY
Qty: 30 TABLET | Refills: 0 | Status: SHIPPED | OUTPATIENT
Start: 2022-01-01 | End: 2022-01-16

## 2022-01-01 RX ADMIN — KETOROLAC TROMETHAMINE 15 MG: 30 INJECTION, SOLUTION INTRAMUSCULAR at 20:26

## 2022-01-01 RX ADMIN — IOPAMIDOL 100 ML: 755 INJECTION, SOLUTION INTRAVENOUS at 17:43

## 2022-01-01 RX ADMIN — LIDOCAINE HYDROCHLORIDE 30 ML: 20 SOLUTION ORAL; TOPICAL at 16:31

## 2022-01-01 ASSESSMENT — ENCOUNTER SYMPTOMS
VOMITING: 0
DIFFICULTY URINATING: 0
CONSTIPATION: 0
ANAL BLEEDING: 1
SHORTNESS OF BREATH: 0
FEVER: 0
NAUSEA: 0
ABDOMINAL PAIN: 1
BACK PAIN: 1

## 2022-01-01 NOTE — ED PROVIDER NOTES
EMERGENCY DEPARTMENT ENCOUNTER       ED Course & Medical Decision Making     3:46 PM I met with the patient for the initial interview and physical examination. Discussed plan for treatment and workup in the ED. PPE: surgical mask and gloves.   6:26 PM Reviewed imaging results.   7:24 PM I discussed the case with cardiothoracic surgeon, Dr. Lorenzo.   7:57 PM I rechecked and updated the patient.     I did see and examined the patient.  IV is established and is placed on the monitor.  Hypertension is noted.    He does have pulses in all extremities.  He does have some epigastric tenderness.  His symptoms tend to be in the midline and radiate towards his back.  I will do a CTA to make sure he does not have any aortic pathology.    Additional diagnostics ordered.    I did independently interpret EKG done today at 1615.  Normal sinus rhythm with a rate of 70 bpm.  Intervals and axis are normal.  No significant ST elevation or depression.  No pathological Q waves.  No previous for comparison        Laboratory studies returned unremarkable.  CT a of the chest abdomen pelvis showed enlargement of the sinuses of Valsalva.  I did discuss this with cardiothoracic surgery on-call and based on the size and symptoms and the read they did not feel that this requires any further emergent work-up or management at this time but did recommend following up with primary care to have continued surveillance.    I did convey this to the patient and he verbalizes understanding.  He was given a GI cocktail which resolved his symptoms.    I suspect that his symptoms are secondary to gastritis or possibly an ulcer.    Concerning the blood that he is noticed after using the restroom he states that he only gets this every once in a while after he has multiple bowel movements per day and believes it is due to irritation and is only a small amount on the tissues.  We are in agreement not to pursue this further.    He will be discharged with  sucralfate and Pepcid and he will follow-up with primary care              Prior to making a final disposition on this patient the results of patient's tests and other diagnostic studies were discussed with the patient. All questions were answered. Patient expressed understanding of the plan and was amenable to it.    Medications   lidocaine (viscous) (XYLOCAINE) 2 % 15 mL, alum & mag hydroxide-simethicone (MAALOX) 15 mL GI Cocktail (30 mLs Oral Given 1/1/22 1631)   iopamidol (ISOVUE-370) solution 100 mL (100 mLs Intravenous Given 1/1/22 1743)       Final Impression     1. Acute gastritis without hemorrhage, unspecified gastritis type    2. Enlargement of aortic root (H)            Chief Complaint     Chief Complaint   Patient presents with     Abdominal Pain       Patient with 3 day history of upper abdominal pain. Patient states he has been having increased bowel movements and today had bright red blood in his stool. Denies nausea or vomiting.       HPI     Shaun Calvo Jr. is a 51 year old male with a pertinent history of morbid obesity, hypertension, atrial fibrillation, esophageal reflux, fibromyalgia, and chronic low back pain who presents for evaluation of abdominal pain.    Patient reports that he developed abdominal pain 3 days ago. Pain started about 1 hour after eating some Chinese food. He states that the pain is localized to the epigastric region and radiates straight through to his back. He has tried omeprazole and AlkaSeltzer with no relief. No history of abdominal surgeries. Patient reports that he is having regular bowel and bladder habits. He states that he did have multiple bowel movements today, and he noted some blood on the toilet paper after wiping which he attributes to irritation. He denies chest pain, nausea, vomiting, or additional symptoms or complaints at this time. Patient notes that he restarted on his blood pressure medications 3 days ago. He states that he does have two 2-ounce  alcoholic mixed drinks most nights after work.       I, Maricel Rankin, am serving as a scribe to document services personally performed by Jamir Avalos M.D. based on my observation and the provider's statements to me. I, Jamir Avalos M.D attest that Maricel Rankin is acting in a scribe capacity, has observed my performance of the services and has documented them in accordance with my direction.    Past Medical History     Past Medical History:   Diagnosis Date     Anxiety      Asthma      Atrial fibrillation (H)      Depression      Hypertension      History reviewed. No pertinent surgical history.  Family History   Problem Relation Age of Onset     Heart Disease Father       Social History     Tobacco Use     Smoking status: Former Smoker     Smokeless tobacco: Never Used   Vaping Use     Vaping Use: Never used   Substance Use Topics     Alcohol use: None     Drug use: None       Relevant past medical, surgical, family and social history as documented above, has been reviewed and discussed with patient. No changes or additions, unless otherwise noted in the HPI.    Current Medications     famotidine (PEPCID) 20 MG tablet  sucralfate (CARAFATE) 1 GM/10ML suspension  albuterol (PROAIR HFA;PROVENTIL HFA;VENTOLIN HFA) 90 mcg/actuation inhaler  aspirin 81 MG EC tablet  B complex-vitamin C-folic acid 400 mcg tablet  cyclobenzaprine (FLEXERIL) 10 MG tablet  fish oil-omega-3 fatty acids (FISH OIL) 300-1,000 mg capsule  hydroCHLOROthiazide (HYDRODIURIL) 25 MG tablet  hydrOXYzine HCL (ATARAX) 25 MG tablet  [START ON 1/21/2022] ketamine HCl POWD  ketorolac (TORADOL) 30 MG/ML injection  lisinopril (ZESTRIL) 20 MG tablet  medical cannabis (Patient's own supply)  naproxen (NAPROSYN) 500 MG tablet  omeprazole (PRILOSEC) 20 MG capsule  pramipexole (MIRAPEX) 0.25 MG tablet  pregabalin (LYRICA) 100 MG capsule  Vitamin D3 (VITAMIN D3) 25 mcg (1000 units) tablet        Allergies     Allergies   Allergen Reactions      Penicillins Anaphylaxis       Review of Systems     Review of Systems   Constitutional: Negative for fever.   Respiratory: Negative for shortness of breath.    Cardiovascular: Negative for chest pain.   Gastrointestinal: Positive for abdominal pain and anal bleeding. Negative for constipation, nausea and vomiting.   Genitourinary: Negative for difficulty urinating.   Musculoskeletal: Positive for back pain.   All other systems reviewed and are negative.     Remainder of systems reviewed, unless noted in HPI all others negative.    Physical Exam     BP (!) 177/97   Pulse 71   Temp 98.3  F (36.8  C) (Oral)   Resp 19   Wt (!) 158.3 kg (349 lb)   SpO2 100%   BMI 47.33 kg/m      Physical Exam  Vitals and nursing note reviewed.   Constitutional:       General: He is not in acute distress.  HENT:      Head: Normocephalic.      Nose: Nose normal.   Eyes:      General: No scleral icterus.  Cardiovascular:      Rate and Rhythm: Normal rate.   Pulmonary:      Effort: Pulmonary effort is normal.   Abdominal:      Tenderness: There is abdominal tenderness in the epigastric area. There is no guarding or rebound.   Musculoskeletal:      Cervical back: Neck supple.   Skin:     Findings: No rash.   Neurological:      Mental Status: He is alert. Mental status is at baseline.   Psychiatric:         Mood and Affect: Mood normal.             Labs & Imaging         Labs Ordered and Resulted from Time of ED Arrival to Time of ED Departure   COMPREHENSIVE METABOLIC PANEL - Normal       Result Value    Sodium 140      Potassium 4.0      Chloride 102      Carbon Dioxide (CO2) 29      Anion Gap 9      Urea Nitrogen 19      Creatinine 1.20      Calcium 9.1      Glucose 96      Alkaline Phosphatase 73      AST 22      ALT 28      Protein Total 6.6      Albumin 4.1      Bilirubin Total 0.4      GFR Estimate 73     LIPASE - Normal    Lipase 31     TROPONIN I - Normal    Troponin I 0.03     CBC WITH PLATELETS AND DIFFERENTIAL    WBC Count  7.3      RBC Count 5.27      Hemoglobin 14.9      Hematocrit 45.1      MCV 86      MCH 28.3      MCHC 33.0      RDW 12.8      Platelet Count 188      % Neutrophils 61      % Lymphocytes 23      % Monocytes 12      % Eosinophils 3      % Basophils 1      % Immature Granulocytes 0      NRBCs per 100 WBC 0      Absolute Neutrophils 4.5      Absolute Lymphocytes 1.7      Absolute Monocytes 0.9      Absolute Eosinophils 0.2      Absolute Basophils 0.0      Absolute Immature Granulocytes 0.0      Absolute NRBCs 0.0           Results for orders placed or performed during the hospital encounter of 01/01/22   CTA Chest Abdomen Pelvis w Contrast    Impression    IMPRESSION:    1.  Enlarged sinuses of Valsalva at 4.5 cm.    2.  Otherwise, nonaneurysmal aorta. No aortic dissection or wall thickening.    3.  No pulmonary embolism identified.    4.  Mild urinary bladder wall thickening; correlate for cystitis.    5.  Hepatic steatosis.    6.  No other obvious findings to explain symptoms.         Comprehensive metabolic panel   Result Value Ref Range    Sodium 140 136 - 145 mmol/L    Potassium 4.0 3.5 - 5.0 mmol/L    Chloride 102 98 - 107 mmol/L    Carbon Dioxide (CO2) 29 22 - 31 mmol/L    Anion Gap 9 5 - 18 mmol/L    Urea Nitrogen 19 8 - 22 mg/dL    Creatinine 1.20 0.70 - 1.30 mg/dL    Calcium 9.1 8.5 - 10.5 mg/dL    Glucose 96 70 - 125 mg/dL    Alkaline Phosphatase 73 45 - 120 U/L    AST 22 0 - 40 U/L    ALT 28 0 - 45 U/L    Protein Total 6.6 6.0 - 8.0 g/dL    Albumin 4.1 3.5 - 5.0 g/dL    Bilirubin Total 0.4 0.0 - 1.0 mg/dL    GFR Estimate 73 >60 mL/min/1.73m2   Result Value Ref Range    Lipase 31 0 - 52 U/L   Result Value Ref Range    Troponin I 0.03 0.00 - 0.29 ng/mL   CBC with platelets and differential   Result Value Ref Range    WBC Count 7.3 4.0 - 11.0 10e3/uL    RBC Count 5.27 4.40 - 5.90 10e6/uL    Hemoglobin 14.9 13.3 - 17.7 g/dL    Hematocrit 45.1 40.0 - 53.0 %    MCV 86 78 - 100 fL    MCH 28.3 26.5 - 33.0 pg    MCHC  33.0 31.5 - 36.5 g/dL    RDW 12.8 10.0 - 15.0 %    Platelet Count 188 150 - 450 10e3/uL    % Neutrophils 61 %    % Lymphocytes 23 %    % Monocytes 12 %    % Eosinophils 3 %    % Basophils 1 %    % Immature Granulocytes 0 %    NRBCs per 100 WBC 0 <1 /100    Absolute Neutrophils 4.5 1.6 - 8.3 10e3/uL    Absolute Lymphocytes 1.7 0.8 - 5.3 10e3/uL    Absolute Monocytes 0.9 0.0 - 1.3 10e3/uL    Absolute Eosinophils 0.2 0.0 - 0.7 10e3/uL    Absolute Basophils 0.0 0.0 - 0.2 10e3/uL    Absolute Immature Granulocytes 0.0 <=0.4 10e3/uL    Absolute NRBCs 0.0 10e3/uL       Jamir Avalos MD  Emergency Medicine  [facility] EMERGENCY DEPARTMENT  [facility] [facility] [address]  [phone]  1/1/2022       Jamir Avalos MD  01/01/22 2024

## 2022-01-01 NOTE — ED TRIAGE NOTES
Patient with 3 day history of upper abdominal pain. Patient states he has been having increased bowel movements and today had bright red blood in his stool. Denies nausea or vomiting.

## 2022-01-02 NOTE — DISCHARGE INSTRUCTIONS
You will need monitoring for your aortic root going forward.  This can be arranged through your primary care doctor

## 2022-01-03 LAB
ATRIAL RATE - MUSE: 70 BPM
DIASTOLIC BLOOD PRESSURE - MUSE: NORMAL MMHG
INTERPRETATION ECG - MUSE: NORMAL
P AXIS - MUSE: 42 DEGREES
PR INTERVAL - MUSE: 164 MS
QRS DURATION - MUSE: 98 MS
QT - MUSE: 416 MS
QTC - MUSE: 449 MS
R AXIS - MUSE: -4 DEGREES
SYSTOLIC BLOOD PRESSURE - MUSE: NORMAL MMHG
T AXIS - MUSE: 69 DEGREES
VENTRICULAR RATE- MUSE: 70 BPM

## 2022-01-04 ENCOUNTER — TELEPHONE (OUTPATIENT)
Dept: FAMILY MEDICINE | Facility: CLINIC | Age: 52
End: 2022-01-04
Payer: COMMERCIAL

## 2022-01-04 DIAGNOSIS — R10.84 ABDOMINAL PAIN, GENERALIZED: Primary | ICD-10-CM

## 2022-01-04 RX ORDER — SUCRALFATE 1 G/1
1 TABLET ORAL 4 TIMES DAILY
Qty: 120 TABLET | Refills: 0 | Status: SHIPPED | OUTPATIENT
Start: 2022-01-04 | End: 2022-02-03

## 2022-01-04 NOTE — TELEPHONE ENCOUNTER
Pt called stating that he was discharged from the hospital on 01/01/2022. Pt discharged with sucralfate 1gm/10ml prescription but insurance does not cover this medication. They will cover for tablets per patient. He is wondering if he can get a new prescription for tabs sent to his [facility] pharmacy on [facility]

## 2022-01-07 ENCOUNTER — OFFICE VISIT (OUTPATIENT)
Dept: FAMILY MEDICINE | Facility: CLINIC | Age: 52
End: 2022-01-07
Payer: COMMERCIAL

## 2022-01-07 VITALS
SYSTOLIC BLOOD PRESSURE: 156 MMHG | HEART RATE: 80 BPM | DIASTOLIC BLOOD PRESSURE: 90 MMHG | OXYGEN SATURATION: 98 % | WEIGHT: 315 LBS | BODY MASS INDEX: 47.59 KG/M2

## 2022-01-07 DIAGNOSIS — R07.9 CHEST PAIN, UNSPECIFIED TYPE: ICD-10-CM

## 2022-01-07 DIAGNOSIS — I10 ESSENTIAL HYPERTENSION: ICD-10-CM

## 2022-01-07 DIAGNOSIS — E66.01 MORBID OBESITY (H): ICD-10-CM

## 2022-01-07 DIAGNOSIS — I71.21: Primary | ICD-10-CM

## 2022-01-07 PROCEDURE — 99214 OFFICE O/P EST MOD 30 MIN: CPT | Performed by: FAMILY MEDICINE

## 2022-01-07 NOTE — PROGRESS NOTES
Assessment & Plan     Enlarged sinus of Valsalva, acquired (H)  Recommend further evaluation with echocardiogram and cardiology referral.  Orders are placed.  Discussed importance of keeping blood pressure under control.  - Adult Cardiology Eval Referral  - Echocardiogram Complete    Chest pain, unspecified type  - Adult Cardiology Eval Referral  - Echocardiogram Complete    Morbid obesity (H)  He expressed interest in assistance with weight loss.  Referral placed to weight management clinic  - Comprehensive Weight Management     Essential hypertension  Discussed importance of keeping blood pressure under good control.  He recently started back on his lisinopril and hydrochlorothiazide.  Advised him to monitor his blood pressure over the next week  And if Blood pressure remained above 140/90 to follow-up with his PCP for medication adjustment.           BMI:   Estimated body mass index is 47.59 kg/m  as calculated from the following:    Height as of 12/27/21: 1.829 m (6').    Weight as of this encounter: 159.2 kg (350 lb 14.4 oz).   Weight management plan: Patient referred to endocrine and/or weight management specialty      No follow-ups on file.    Harika Davenport MD  [facility]    Tera Sandoval is a 51 year old who presents for the following health issues     HPI   He is seen today for emergency department follow-up visit.  Reviewed his emergency department records.  He was seen in the ED on 1/1/2022 with complaint of abdominal pain.  He has history of GERD and normally takes omeprazole which works very well for him.  Developed abdominal pain approximately 3 days before being seen in the ED.  Was not experiencing relief with omeprazole and Korin-Dupont so presented for further evaluation.  Diagnosed with acute gastritis after evaluation which included a normal comprehensive metabolic panel, normal lipase and troponin and normal CBC.  EKG was normal.  He did have a CT of the chest and  abdomen which showed enlargement of the sinuses of Valsalva.  He was advised to follow-up with his PCP regarding this.  He was then prescribed sucralfate and Pepcid and discharged to home.    Today he reports that he has not had recurrence of the abdominal pain.  He has questions about whether he needs to continue to take the sucralfate and the famotidine along with the omeprazole.  He is primarily concerned about the enlargement of the sinuses of Valsalva.  States that his father  of an unknown heart condition.  Patient does experience pain and discomfort in the center of his chest.  He does have significant left knee pain and is planning having surgery.  He wants to make sure that there are no significant heart issues prior to having the surgery.  Blood pressure is noted to be elevated today.  He recently started back on his blood pressure medication last week.  He is currently prescribed lisinopril 20 mg daily and hydrochlorothiazide 25 mg daily.  Medications and allergies are reviewed and updated.  Review of systems is otherwise negative.  No other concerns or questions today.        Review of Systems         Objective    BP (!) 156/90 (BP Location: Right arm, Patient Position: Sitting, Cuff Size: Adult Large)   Pulse 80   Wt (!) 159.2 kg (350 lb 14.4 oz)   SpO2 98%   BMI 47.59 kg/m    Body mass index is 47.59 kg/m .  Physical Exam   GENERAL: healthy, alert and no distress  PSYCH: mentation appears normal, affect normal/bright

## 2022-01-07 NOTE — PATIENT INSTRUCTIONS
Monitor blood pressure.    If blood pressure remains above 140/90 after taking your meds consistently for a couple weeks, talk to Dr. Vo about changing your blood pressure medication    Ok to use famotidine and sucralfate as needed. Continue omeprazole daily

## 2022-01-12 ENCOUNTER — OFFICE VISIT (OUTPATIENT)
Dept: PALLIATIVE MEDICINE | Facility: OTHER | Age: 52
End: 2022-01-12
Payer: COMMERCIAL

## 2022-01-12 VITALS
TEMPERATURE: 98.2 F | OXYGEN SATURATION: 99 % | HEART RATE: 80 BPM | SYSTOLIC BLOOD PRESSURE: 171 MMHG | DIASTOLIC BLOOD PRESSURE: 86 MMHG

## 2022-01-12 DIAGNOSIS — M79.18 MYOFASCIAL PAIN SYNDROME: Primary | ICD-10-CM

## 2022-01-12 PROCEDURE — 96372 THER/PROPH/DIAG INJ SC/IM: CPT | Performed by: PAIN MEDICINE

## 2022-01-12 PROCEDURE — 250N000011 HC RX IP 250 OP 636: Performed by: PAIN MEDICINE

## 2022-01-12 PROCEDURE — 20552 NJX 1/MLT TRIGGER POINT 1/2: CPT | Performed by: PAIN MEDICINE

## 2022-01-12 RX ORDER — DEXAMETHASONE SODIUM PHOSPHATE 10 MG/ML
10 INJECTION, SOLUTION INTRAMUSCULAR; INTRAVENOUS ONCE
Status: COMPLETED | OUTPATIENT
Start: 2022-01-12 | End: 2022-01-12

## 2022-01-12 RX ORDER — BUPIVACAINE HYDROCHLORIDE 2.5 MG/ML
10 INJECTION, SOLUTION EPIDURAL; INFILTRATION; INTRACAUDAL ONCE
Status: COMPLETED | OUTPATIENT
Start: 2022-01-12 | End: 2022-01-12

## 2022-01-12 RX ADMIN — BUPIVACAINE HYDROCHLORIDE 25 MG: 2.5 INJECTION, SOLUTION EPIDURAL; INFILTRATION; INTRACAUDAL; PERINEURAL at 16:20

## 2022-01-12 RX ADMIN — DEXAMETHASONE SODIUM PHOSPHATE 10 MG: 10 INJECTION, SOLUTION INTRAMUSCULAR; INTRAVENOUS at 16:21

## 2022-01-12 ASSESSMENT — PAIN SCALES - GENERAL: PAINLEVEL: MODERATE PAIN (5)

## 2022-01-12 NOTE — LETTER
1/12/2022         RE: Shaun Calvo Jr.  [address] [place] [address]        Dear Colleague,    Thank you for referring your patient, Shaun Calvo Jr., to the [facility]. Please see a copy of my visit note below.    Pre-procedure Intake  If YES to any questions or NO to having a   Please complete laminated checklist and leave on the computer keyboard for Provider, verbally inform provider if able.    For SCS Trial, RFA's or any sedation procedure:  Have you been fasting? NA    If yes, for how long?     Are you taking any any blood thinners such as Coumadin, Warfarin, Jantoven, Pradaxa Xarelto, Eliquis, Edoxaban, Enoxaparin, Lovenox, Heparin, Arixtra, Fondaparinux, or Fragmin? OR Antiplatelet medication such as Plavix, Brilinta, or Effient?   No     If yes, when did you take your last dose?     Do you take aspirin?  Yes -   ASA    If cervical procedure, have you held aspirin for 6 days?   NA  Taken 81 mg of Asprin on 1/11/2022     Do you have any allergies to contrast dye, iodine, steroid and/or numbing medications?  NO    Are you currently taking antibiotics or have an active infection?  NO    Have you had a fever/elevated temperature within the past week? NO    Are you currently taking oral steroids? NO    Do you have a ? NA    Are you pregnant or breastfeeding?  Not Applicable    Have you received the COVID-19 vaccine? Yes    If yes, was it your 1st, 2nd or only dose needed? 2nd dose    Date of most recent vaccine: 06/12/2021    Notify provider and RNs if systolic BP >170, diastolic BP >100, P >100 or O2 sats < 90%    Leana Patricio MA  [facility]      Again, thank you for allowing me to participate in the care of your patient.        Sincerely,        Jamaal RIDLEY MD

## 2022-01-12 NOTE — PATIENT INSTRUCTIONS
[facility]  Post Procedure Instructions    Today you had:  trigger point injections                                Medications used:    bupivicaine     dexamethasone          Go to the emergency room if you develop any shortness of breath    Monitor the injection sites for signs and symptoms of infection-fever, chills, redness, swelling, warmth, or drainage to areas.    You may have soreness at injection sites for up to 24 hours.    You may apply ice to the painful areas to help minimize the discomfort of the needle pokes.    Do not apply heat to sites for at least 12 hours.    You may use anti-inflammatory medications or Tylenol for pain control if necessary    Pain Clinic phone number:  [phone]

## 2022-01-12 NOTE — PROCEDURES
TRIGGER POINT INJECTION  Performed on: 1/12/2022    Pre Procedure Diagnosis:  Myofascial pain  Vital Signs:  As per intra-procedure documentation    Mr. Calvo is a 52-year-old man with low back pain.  He had a radiofrequency ablation procedure done at L5-S1 and continues to have some myofascial pain in the lumbosacral area.  He is here today to give a trial to trigger point injection.    The procedure was discussed with Shaun Calvo Jr. in detail along with the attendant risks, including but not limited to: nerve injury, infection, bleeding, allergic reaction, or worsening of pain.  Informed consent was obtained and patient elected to proceed.    After informed consent was obtained the patient was seated in the examination chair.  The lumbosacral area was prepped sterilely with alcohol.  A 3-1/2 inch Quincke spinal needle was used.  Injections were made in the lumbar paraspinal musculature bilaterally.  A total of 10 mL of 0.25% Marcaine with 10 mg of Decadron was used in divided doses.    The patient tolerated the procedure well.  There were no complications.      If Shaun Calvo Jr. has any questions or concerns after discharge, he was instructed to

## 2022-01-12 NOTE — PROGRESS NOTES
Pre-procedure Intake  If YES to any questions or NO to having a   Please complete laminated checklist and leave on the computer keyboard for Provider, verbally inform provider if able.    For SCS Trial, RFA's or any sedation procedure:  Have you been fasting? NA    If yes, for how long?     Are you taking any any blood thinners such as Coumadin, Warfarin, Jantoven, Pradaxa Xarelto, Eliquis, Edoxaban, Enoxaparin, Lovenox, Heparin, Arixtra, Fondaparinux, or Fragmin? OR Antiplatelet medication such as Plavix, Brilinta, or Effient?   No     If yes, when did you take your last dose?     Do you take aspirin?  Yes -   ASA    If cervical procedure, have you held aspirin for 6 days?   NA  Taken 81 mg of Asprin on 1/11/2022     Do you have any allergies to contrast dye, iodine, steroid and/or numbing medications?  NO    Are you currently taking antibiotics or have an active infection?  NO    Have you had a fever/elevated temperature within the past week? NO    Are you currently taking oral steroids? NO    Do you have a ? NA    Are you pregnant or breastfeeding?  Not Applicable    Have you received the COVID-19 vaccine? Yes    If yes, was it your 1st, 2nd or only dose needed? 2nd dose    Date of most recent vaccine: 06/12/2021    Notify provider and RNs if systolic BP >170, diastolic BP >100, P >100 or O2 sats < 90%    Leana Patricio MA  [facility] [place]

## 2022-01-13 DIAGNOSIS — G89.29 CHRONIC PAIN OF LEFT KNEE: Primary | ICD-10-CM

## 2022-01-13 DIAGNOSIS — M17.12 PRIMARY OSTEOARTHRITIS OF LEFT KNEE: Primary | ICD-10-CM

## 2022-01-13 DIAGNOSIS — M25.562 CHRONIC PAIN OF LEFT KNEE: Primary | ICD-10-CM

## 2022-01-13 PROCEDURE — 20552 NJX 1/MLT TRIGGER POINT 1/2: CPT | Performed by: PAIN MEDICINE

## 2022-01-18 VITALS
WEIGHT: 315 LBS | DIASTOLIC BLOOD PRESSURE: 80 MMHG | SYSTOLIC BLOOD PRESSURE: 138 MMHG | BODY MASS INDEX: 42.64 KG/M2 | HEART RATE: 89 BPM

## 2022-01-18 VITALS
SYSTOLIC BLOOD PRESSURE: 138 MMHG | HEART RATE: 91 BPM | WEIGHT: 307.13 LBS | DIASTOLIC BLOOD PRESSURE: 80 MMHG | BODY MASS INDEX: 39.43 KG/M2

## 2022-01-18 VITALS — SYSTOLIC BLOOD PRESSURE: 152 MMHG | DIASTOLIC BLOOD PRESSURE: 86 MMHG

## 2022-01-18 VITALS
SYSTOLIC BLOOD PRESSURE: 199 MMHG | DIASTOLIC BLOOD PRESSURE: 114 MMHG | HEART RATE: 80 BPM | HEIGHT: 74 IN | WEIGHT: 315 LBS | BODY MASS INDEX: 40.43 KG/M2

## 2022-01-18 VITALS
DIASTOLIC BLOOD PRESSURE: 76 MMHG | RESPIRATION RATE: 20 BRPM | HEART RATE: 92 BPM | BODY MASS INDEX: 40.43 KG/M2 | OXYGEN SATURATION: 97 % | SYSTOLIC BLOOD PRESSURE: 132 MMHG | WEIGHT: 315 LBS | HEIGHT: 74 IN | TEMPERATURE: 98.1 F

## 2022-01-18 VITALS
HEART RATE: 83 BPM | DIASTOLIC BLOOD PRESSURE: 88 MMHG | BODY MASS INDEX: 42.2 KG/M2 | SYSTOLIC BLOOD PRESSURE: 132 MMHG | OXYGEN SATURATION: 98 % | RESPIRATION RATE: 20 BRPM | WEIGHT: 315 LBS | TEMPERATURE: 97.4 F

## 2022-01-18 VITALS
DIASTOLIC BLOOD PRESSURE: 100 MMHG | SYSTOLIC BLOOD PRESSURE: 190 MMHG | HEART RATE: 99 BPM | WEIGHT: 315 LBS | BODY MASS INDEX: 41.8 KG/M2

## 2022-01-18 ASSESSMENT — PATIENT HEALTH QUESTIONNAIRE - PHQ9: SUM OF ALL RESPONSES TO PHQ QUESTIONS 1-9: 26

## 2022-01-19 ASSESSMENT — ASTHMA QUESTIONNAIRES: ACT_TOTALSCORE: 16

## 2022-01-26 DIAGNOSIS — J45.20 MILD INTERMITTENT ASTHMA WITHOUT COMPLICATION: ICD-10-CM

## 2022-01-27 ENCOUNTER — TELEPHONE (OUTPATIENT)
Dept: PALLIATIVE MEDICINE | Facility: OTHER | Age: 52
End: 2022-01-27
Payer: COMMERCIAL

## 2022-01-27 DIAGNOSIS — J45.20 MILD INTERMITTENT ASTHMA WITHOUT COMPLICATION: ICD-10-CM

## 2022-01-27 DIAGNOSIS — I10 ESSENTIAL HYPERTENSION: ICD-10-CM

## 2022-01-27 RX ORDER — ALBUTEROL SULFATE 90 UG/1
AEROSOL, METERED RESPIRATORY (INHALATION)
Qty: 8.5 G | Refills: 5 | Status: SHIPPED | OUTPATIENT
Start: 2022-01-27 | End: 2022-09-21

## 2022-01-27 RX ORDER — ALBUTEROL SULFATE 90 UG/1
AEROSOL, METERED RESPIRATORY (INHALATION)
Qty: 8.5 G | Refills: 5 | Status: CANCELLED | OUTPATIENT
Start: 2022-01-27

## 2022-01-27 NOTE — TELEPHONE ENCOUNTER
PA Initiation 1/27/2022    Medication: ketorolac 30 mg/mL inj is non formulary  Insurance Company:  [employer]  Pharmacy Filling the Rx:  [facility]   Filling Pharmacy Phone:  [phone]  Filling Pharmacy Fax:  [phone]  Start Date:  12/27/2021

## 2022-01-28 ENCOUNTER — HOSPITAL ENCOUNTER (OUTPATIENT)
Dept: CARDIOLOGY | Facility: HOSPITAL | Age: 52
Discharge: HOME | End: 2022-01-28
Attending: FAMILY MEDICINE | Admitting: FAMILY MEDICINE
Payer: COMMERCIAL

## 2022-01-28 DIAGNOSIS — I71.21: ICD-10-CM

## 2022-01-28 DIAGNOSIS — R07.9 CHEST PAIN, UNSPECIFIED TYPE: ICD-10-CM

## 2022-01-28 LAB — LVEF ECHO: NORMAL

## 2022-01-28 PROCEDURE — 93306 TTE W/DOPPLER COMPLETE: CPT | Mod: 26 | Performed by: INTERNAL MEDICINE

## 2022-01-28 PROCEDURE — 255N000002 HC RX 255 OP 636: Performed by: FAMILY MEDICINE

## 2022-01-28 RX ADMIN — PERFLUTREN 3 ML: 6.52 INJECTION, SUSPENSION INTRAVENOUS at 14:31

## 2022-01-28 NOTE — TELEPHONE ENCOUNTER
"Routing refill request to provider for review/approval because:  ACT score was not 20 or more.    Last Written Prescription Date:  03/16/2021  Last Fill Quantity: 8.5 g,  # refills: 5   Last office visit provider:  01/07/2022 with Dr Davenport.     Requested Prescriptions   Pending Prescriptions Disp Refills     albuterol (PROAIR HFA/PROVENTIL HFA/VENTOLIN HFA) 108 (90 Base) MCG/ACT inhaler [Pharmacy Med Name: ALBUTEROL HFA INH (200 PUFFS)8.5GM] 8.5 g 5     Sig: INHALE 2 PUFFS BY MOUTH EVERY 4 HOURS AS NEEDED       Asthma Maintenance Inhalers - Anticholinergics Failed - 1/26/2022 12:58 PM        Failed - Asthma control assessment score within normal limits in last 6 months     Please review ACT score.           Passed - Patient is age 12 years or older        Passed - Medication is active on med list        Passed - Recent (6 mo) or future (30 days) visit within the authorizing provider's specialty     Patient had office visit in the last 6 months or has a visit in the next 30 days with authorizing provider or within the authorizing provider's specialty.  See \"Patient Info\" tab in inbasket, or \"Choose Columns\" in Meds & Orders section of the refill encounter.           Short-Acting Beta Agonist Inhalers Protocol  Failed - 1/26/2022 12:58 PM        Failed - Asthma control assessment score within normal limits in last 6 months     Please review ACT score.           Passed - Patient is age 12 or older        Passed - Medication is active on med list        Passed - Recent (6 mo) or future (30 days) visit within the authorizing provider's specialty     Patient had office visit in the last 6 months or has a visit in the next 30 days with authorizing provider or within the authorizing provider's specialty.  See \"Patient Info\" tab in inbasket, or \"Choose Columns\" in Meds & Orders section of the refill encounter.                 Celina Mar 01/27/22 7:40 PM  "

## 2022-01-29 NOTE — TELEPHONE ENCOUNTER
"Routing refill request to provider for review/approval because:  Elevated blood pressure    Last Written Prescription Date:  01/20/2021  Last Fill Quantity: 90,  # refills: 3   Last office visit provider:   01/07/2022 with Dr Davenport.    Requested Prescriptions   Pending Prescriptions Disp Refills     hydrochlorothiazide (HYDRODIURIL) 25 MG tablet [Pharmacy Med Name: HYDROCHLOROTHIAZIDE 25MG TABLETS] 90 tablet 3     Sig: TAKE 1 TABLET(25 MG) BY MOUTH DAILY       Diuretics (Including Combos) Protocol Failed - 1/27/2022 10:21 AM        Failed - Blood pressure under 140/90 in past 12 months     BP Readings from Last 3 Encounters:   01/12/22 (!) 171/86   01/07/22 (!) 156/90   01/01/22 (!) 177/97                 Passed - Recent (12 mo) or future (30 days) visit within the authorizing provider's specialty     Patient has had an office visit with the authorizing provider or a provider within the authorizing providers department within the previous 12 mos or has a future within next 30 days. See \"Patient Info\" tab in inbasket, or \"Choose Columns\" in Meds & Orders section of the refill encounter.              Passed - Medication is active on med list        Passed - Patient is age 18 or older        Passed - Normal serum creatinine on file in past 12 months     Recent Labs   Lab Test 01/01/22  1607   CR 1.20              Passed - Normal serum potassium on file in past 12 months     Recent Labs   Lab Test 01/01/22  1607   POTASSIUM 4.0                    Passed - Normal serum sodium on file in past 12 months     Recent Labs   Lab Test 01/01/22  1607                      Celina Mar 01/28/22 9:14 PM  "

## 2022-01-30 RX ORDER — ALBUTEROL SULFATE 90 UG/1
AEROSOL, METERED RESPIRATORY (INHALATION)
Qty: 8.5 G | Refills: 5 | OUTPATIENT
Start: 2022-01-30

## 2022-01-30 NOTE — TELEPHONE ENCOUNTER
"duplicate    Requested Prescriptions   Pending Prescriptions Disp Refills     albuterol (PROAIR HFA/PROVENTIL HFA/VENTOLIN HFA) 108 (90 Base) MCG/ACT inhaler [Pharmacy Med Name: ALBUTEROL HFA INH (200 PUFFS)8.5GM] 8.5 g 5     Sig: INHALE 2 PUFFS BY MOUTH EVERY 4 HOURS AS NEEDED       Asthma Maintenance Inhalers - Anticholinergics Failed - 1/27/2022  7:24 PM        Failed - Asthma control assessment score within normal limits in last 6 months     Please review ACT score.           Passed - Patient is age 12 years or older        Passed - Medication is active on med list        Passed - Recent (6 mo) or future (30 days) visit within the authorizing provider's specialty     Patient had office visit in the last 6 months or has a visit in the next 30 days with authorizing provider or within the authorizing provider's specialty.  See \"Patient Info\" tab in inbasket, or \"Choose Columns\" in Meds & Orders section of the refill encounter.           Short-Acting Beta Agonist Inhalers Protocol  Failed - 1/27/2022  7:24 PM        Failed - Asthma control assessment score within normal limits in last 6 months     Please review ACT score.           Passed - Patient is age 12 or older        Passed - Medication is active on med list        Passed - Recent (6 mo) or future (30 days) visit within the authorizing provider's specialty     Patient had office visit in the last 6 months or has a visit in the next 30 days with authorizing provider or within the authorizing provider's specialty.  See \"Patient Info\" tab in inbasket, or \"Choose Columns\" in Meds & Orders section of the refill encounter.                 Camila Cruz RN 01/30/22 8:39 AM  "

## 2022-01-31 ENCOUNTER — TELEPHONE (OUTPATIENT)
Dept: FAMILY MEDICINE | Facility: CLINIC | Age: 52
End: 2022-01-31
Payer: COMMERCIAL

## 2022-01-31 RX ORDER — HYDROCHLOROTHIAZIDE 25 MG/1
TABLET ORAL
Qty: 90 TABLET | Refills: 3 | Status: SHIPPED | OUTPATIENT
Start: 2022-01-31 | End: 2024-07-19

## 2022-01-31 NOTE — TELEPHONE ENCOUNTER
----- Message from Harika Davenport MD sent at 1/30/2022  7:02 PM CST -----  Please let pt know that his echo shows normal heart function. His aortic root is moderately dilated.  I recommend he see cardiology for further evaluation. A referral was placed previously. Does he need help scheduling the appointment?

## 2022-02-07 NOTE — TELEPHONE ENCOUNTER
Central Prior Authorization Team  Phone: [phone]    PA Initiation    Medication: ketorolac 30 mg/mL inj is non formulary  Insurance Company: [employer] - Phone [phone] Fax [phone]  Pharmacy Filling the Rx: [id number] - [place], [place] - [address] AT [facility] [address]  Filling Pharmacy Phone: [phone]  Filling Pharmacy Fax:    Start Date: 2/7/2022

## 2022-02-08 NOTE — TELEPHONE ENCOUNTER
PRIOR AUTHORIZATION DENIED    Medication: ketorolac 30 mg/mL inj is non formulary- DENIED     Denial Date: 2/7/2022    Denial Rational: Criteria not met.      Appeal Information: If provider would like to appeal please provide a letter of medical necessity.

## 2022-02-08 NOTE — TELEPHONE ENCOUNTER
RN tried reaching out to patient and received a vague voicemail.  RN left a vague voicemail requesting a returned call from patient.

## 2022-03-16 ENCOUNTER — TRANSFERRED RECORDS (OUTPATIENT)
Dept: HEALTH INFORMATION MANAGEMENT | Facility: CLINIC | Age: 52
End: 2022-03-16
Payer: COMMERCIAL

## 2022-03-28 NOTE — ED NOTES
Called patient. Reports steroid dose pack is helping somewhat. Discussed with Dr. Carlos Bates that her dose pack will overlap with her epidural.  She reports that she can proceed. Pt returns from CT

## 2022-04-01 DIAGNOSIS — M51.26 LUMBAR DISC HERNIATION: ICD-10-CM

## 2022-04-01 DIAGNOSIS — M47.816 SPONDYLOSIS OF LUMBAR REGION WITHOUT MYELOPATHY OR RADICULOPATHY: ICD-10-CM

## 2022-04-01 DIAGNOSIS — M54.16 LUMBAR RADICULITIS: ICD-10-CM

## 2022-04-04 RX ORDER — CYCLOBENZAPRINE HCL 10 MG
TABLET ORAL
Qty: 60 TABLET | Refills: 0 | Status: SHIPPED | OUTPATIENT
Start: 2022-04-04 | End: 2022-05-20

## 2022-04-04 NOTE — TELEPHONE ENCOUNTER
Routing refill request to provider for review/approval because:  Drug not on the [facility] refill protocol     Last Written Prescription Date:  2/16/2022  Last Fill Quantity: 60,  # refills: 0   Last office visit provider:  10/27/2021     Requested Prescriptions   Pending Prescriptions Disp Refills     cyclobenzaprine (FLEXERIL) 10 MG tablet [Pharmacy Med Name: CYCLOBENZAPRINE 10MG TABLETS] 60 tablet 0     Sig: TAKE 1 TABLET(10 MG) BY MOUTH THREE TIMES DAILY AS NEEDED FOR MUSCLE SPASMS       There is no refill protocol information for this order          Lizzie Encinas, AALIYAH 04/03/22 11:16 PM

## 2022-04-21 DIAGNOSIS — M54.50 CHRONIC BILATERAL LOW BACK PAIN WITHOUT SCIATICA: ICD-10-CM

## 2022-04-21 DIAGNOSIS — G89.29 CHRONIC BILATERAL LOW BACK PAIN WITHOUT SCIATICA: ICD-10-CM

## 2022-04-21 RX ORDER — KETAMINE HCL 100 %
POWDER (GRAM) MISCELLANEOUS
Qty: 60 G | Refills: 2 | Status: SHIPPED | OUTPATIENT
Start: 2022-04-21 | End: 2022-07-18

## 2022-04-21 NOTE — TELEPHONE ENCOUNTER
Received call from patient requesting refill(s) of Ketamine 120 mg     Last dispensed from pharmacy on 01/21/2022    Patient's last office/virtual visit by prescribing provider on 12/27/2022  Next office/virtual appointment scheduled for 04/25/2022    Last urine drug screen date 12/27/2021  Current opioid agreement on file (completed within the last year) No Date of opioid agreement: 12/27/2021    E-prescribe to [facility] - [place], [place] - [address] [place] [address]  pharmacy    Will route to nursing [place] for review and preparation of prescription(s).     Leana Delacruz MA  [facility] [place]

## 2022-04-21 NOTE — TELEPHONE ENCOUNTER
Patient requesting refill Ketamine 120 mg one-half under tongue 4 times a day    Pharmacy [facility] [address] [place], [place]    Fax from Pharmacy        Negin Mora  [facility] Patient Facilitator

## 2022-04-21 NOTE — TELEPHONE ENCOUNTER
Pending Prescriptions:                       Disp   Refills    ketamine HCl POWD                         60 g   2            Sig: Ketamine 120 mg one-half under tongue 4 times a           day    [facility]

## 2022-04-25 ENCOUNTER — OFFICE VISIT (OUTPATIENT)
Dept: PALLIATIVE MEDICINE | Facility: OTHER | Age: 52
End: 2022-04-25
Payer: COMMERCIAL

## 2022-04-25 VITALS
OXYGEN SATURATION: 98 % | SYSTOLIC BLOOD PRESSURE: 192 MMHG | DIASTOLIC BLOOD PRESSURE: 99 MMHG | BODY MASS INDEX: 47.06 KG/M2 | WEIGHT: 315 LBS | HEART RATE: 86 BPM

## 2022-04-25 DIAGNOSIS — M17.12 PRIMARY OSTEOARTHRITIS OF LEFT KNEE: ICD-10-CM

## 2022-04-25 DIAGNOSIS — M54.50 CHRONIC BILATERAL LOW BACK PAIN WITHOUT SCIATICA: Primary | ICD-10-CM

## 2022-04-25 DIAGNOSIS — G89.29 CHRONIC BILATERAL LOW BACK PAIN WITHOUT SCIATICA: Primary | ICD-10-CM

## 2022-04-25 PROCEDURE — 99213 OFFICE O/P EST LOW 20 MIN: CPT | Performed by: ANESTHESIOLOGY

## 2022-04-25 RX ORDER — KETOROLAC TROMETHAMINE 30 MG/ML
30 INJECTION, SOLUTION INTRAMUSCULAR; INTRAVENOUS EVERY 6 HOURS PRN
Qty: 8 ML | Refills: 3 | Status: SHIPPED | OUTPATIENT
Start: 2022-04-25 | End: 2022-08-22

## 2022-04-25 ASSESSMENT — PAIN SCALES - GENERAL: PAINLEVEL: EXTREME PAIN (8)

## 2022-04-25 NOTE — PROGRESS NOTES
Patient presents to the clinic today for a follow up with CHITO HARKINS MD regarding Pain Management.      PEG Score 12/27/2021 4/25/2022   PEG Total Score 7.33 5.67           UDS/CSA-UDT/CSA = 12/27/2021      Medications-        QUESTIONS:              Negin SERRANO [facility] Patient Facilitator

## 2022-04-25 NOTE — PROGRESS NOTES
[facility] - Office Visit    ASSESSMENT & PLAN     Shaun was seen today for pain.    Diagnoses and all orders for this visit:    Chronic bilateral low back pain without sciatica  -     PAIN INJECTION EVAL/TREAT/FOLLOW UP; Future    Primary osteoarthritis of left knee  -     ketorolac (TORADOL) 30 MG/ML injection; Inject 1 mL (30 mg) into the muscle every 6 hours as needed for moderate pain No more than two doses per week        Patient Instructions   PLAN:    At checkout you may schedule a radiofrequency ablation in June.    You are working with cardiology around your enlarged aorta.    You continue with orthopedics and will schedule your knee surgery as able.    Continue with the ketamine 1 and 20 mg lozenges 1/2 lozenge 4 times a day.    Continue with the Toradol injections no more than 2 times a week, and do not take the naproxen when you are using the Toradol that day.    Continue the medical cannabis program.    Follow-up with Dr. Harkins in 4 months        -----  CHITO HARKINS MD  [facility]       WILL Calvo Jr. is a 52 year old year old male who presents to clinic today for the following:     Follow-up for osteoarthritis of the knees.    Reviews is gone back to work 6 or 7 hours a day 5 days a week.  There are financial pressures, they want to move.  He has noted increased pain as he is working.    Reviews his brother has had a heart attack, in the hospital and consider brain death, and there will be a likely disconnecting today.  Discusses his involvement with extended family and responsibilities he is taking on.    He was in the emergency room concerned he was having a heart attack found to have GI issues and a large aortic root.  That now needs to be addressed before he can have knee surgery.  He continues to going every 2 weeks to have fluid removed from his left knee, 2-week times ago 140 cm.  They will of surgery do with the meniscal  tear.  Having some problems with his right knee as well.    His back pain has been more of a challenge as he has been more active.  He did have a radiofrequency ablation in December which was helpful and understands he can repeat that every 4 to 6 months would like to schedule in June.    Continues with ketamine finding that useful, wanted 20 mg lozenge 1/2 lozenge 4 times a day.  Describes the dose to make his mouth and face numb so that he does not talk to people for 30 minutes after each dose.  Is been use along with the medical cannabis and trying different preparations.    Continues with the Toradol injection every 3 to 4 days.  He can tells when that is wearing off.  He does not take Naprosyn on those days.  Using muscle relaxants as well.      Current Outpatient Medications:      albuterol (PROAIR HFA/PROVENTIL HFA/VENTOLIN HFA) 108 (90 Base) MCG/ACT inhaler, INHALE 2 PUFFS BY MOUTH EVERY 4 HOURS AS NEEDED, Disp: 8.5 g, Rfl: 5     aspirin 81 MG EC tablet, Take 81 mg by mouth daily, Disp: , Rfl:      cyclobenzaprine (FLEXERIL) 10 MG tablet, TAKE 1 TABLET(10 MG) BY MOUTH THREE TIMES DAILY AS NEEDED FOR MUSCLE SPASMS, Disp: 60 tablet, Rfl: 0     hydrochlorothiazide (HYDRODIURIL) 25 MG tablet, TAKE 1 TABLET(25 MG) BY MOUTH DAILY, Disp: 90 tablet, Rfl: 3     ketamine HCl POWD, Ketamine 120 mg one-half under tongue 4 times a day, Disp: 60 g, Rfl: 2     ketorolac (TORADOL) 30 MG/ML injection, Inject 1 mL (30 mg) into the muscle every 6 hours as needed for moderate pain No more than two doses per week, Disp: 8 mL, Rfl: 3     lisinopril (ZESTRIL) 20 MG tablet, Take 1 tablet (20 mg) by mouth daily, Disp: 90 tablet, Rfl: 3     medical cannabis (Patient's own supply), See Admin Instructions (The purpose of this order is to document that the patient reports taking medical cannabis.  This is not a prescription, and is not used to certify that the patient has a qualifying medical condition.) Vaping Oil andFlower leaf; 3  puffs inhaled every 3 hours as needed, Disp: , Rfl:      naproxen (NAPROSYN) 500 MG tablet, TAKE 1 TABLET(500 MG) BY MOUTH TWICE DAILY WITH MEALS AS NEEDED FOR PAIN, Disp: 28 tablet, Rfl: 1     omeprazole (PRILOSEC) 20 MG capsule, Take 20 mg by mouth every morning OTC, Disp: , Rfl:      B complex-vitamin C-folic acid 400 mcg tablet, [B COMPLEX-VITAMIN C-FOLIC ACID 400 MCG TABLET] Take 1 tablet by mouth daily., Disp: 30 tablet, Rfl: 5     fish oil-omega-3 fatty acids (FISH OIL) 300-1,000 mg capsule, Take 2 g by mouth daily OTC (Patient not taking: Reported on 4/25/2022), Disp: , Rfl:      pregabalin (LYRICA) 100 MG capsule, Take 1 capsule (100 mg) by mouth daily, Disp: 30 capsule, Rfl: 0     Vitamin D3 (VITAMIN D3) 25 mcg (1000 units) tablet, Take 2 tablets (50 mcg) by mouth 2 times daily, Disp: 120 tablet, Rfl: 0      Review of Systems   General, psych, musculoskeletal, bowels and bladder otherwise normal other than above.          OBJECTIVE   BP (!) 171/102   Pulse 106   Ht 1.829 m (6')   Wt 103.8 kg (228 lb 14.4 oz)   SpO2 97%   BMI 31.04 kg/m        Physical Exam  General:  Normal appearance, no apparent distress  Cardiovascular: Normal rate  Lungs: Pulmonary effort is normal, speaking in full sentences  MSK: Left knee indeed edematous.  Skin:. No concerning rashes or lesions.  Neurologic: No focal deficit, alert and oriented x3  Psychiatric: normal mood and affect, cooperative      Assessment: Knee pain, osteoarthritis and meniscal tear.  Planning surgery, now to address other cardiovascular concerns.    The above regimen has helped him maintain appointment with minimal side effects.    Total time more than 20 minutes

## 2022-04-25 NOTE — PATIENT INSTRUCTIONS
PLAN:    At checkout you may schedule a radiofrequency ablation in June.    You are working with cardiology around your enlarged aorta.    You continue with orthopedics and will schedule your knee surgery as able.    Continue with the ketamine 1 and 20 mg lozenges 1/2 lozenge 4 times a day.    Continue with the Toradol injections no more than 2 times a week, and do not take the naproxen when you are using the Toradol that day.    Continue the medical cannabis program.    Follow-up with Dr. Nagel in 4 months

## 2022-04-25 NOTE — LETTER
4/25/2022         RE: Shaun Calvo Jr.  [address] [place] [address]        Dear Colleague,    Thank you for referring your patient, Shaun Calvo Jr., to the [facility]. Please see a copy of my visit note below.      Patient presents to the clinic today for a follow up with CHITO HARKINS MD regarding Pain Management.      PEG Score 12/27/2021 4/25/2022   PEG Total Score 7.33 5.67           UDS/CSA-UDT/CSA = 12/27/2021      Medications-        QUESTIONS:              Negin Mora  [facility] Patient Facilitator    [facility] - Office Visit    ASSESSMENT & PLAN     Shaun was seen today for pain.    Diagnoses and all orders for this visit:    Chronic bilateral low back pain without sciatica  -     PAIN INJECTION EVAL/TREAT/FOLLOW UP; Future    Primary osteoarthritis of left knee  -     ketorolac (TORADOL) 30 MG/ML injection; Inject 1 mL (30 mg) into the muscle every 6 hours as needed for moderate pain No more than two doses per week        Patient Instructions   PLAN:    At checkout you may schedule a radiofrequency ablation in June.    You are working with cardiology around your enlarged aorta.    You continue with orthopedics and will schedule your knee surgery as able.    Continue with the ketamine 1 and 20 mg lozenges 1/2 lozenge 4 times a day.    Continue with the Toradol injections no more than 2 times a week, and do not take the naproxen when you are using the Toradol that day.    Continue the medical cannabis program.    Follow-up with Dr. Harkins in 4 months        -----  CHITO HARKINS MD  [facility]       SUBJECTIVE      Shaun Calvo Jr. is a 52 year old year old male who presents to clinic today for the following:     Follow-up for osteoarthritis of the knees.    Reviews is gone back to work 6 or 7 hours a day 5 days a week.  There are financial pressures, they want to move.  He has noted increased pain  as he is working.    Reviews his brother has had a heart attack, in the hospital and consider brain death, and there will be a likely disconnecting today.  Discusses his involvement with extended family and responsibilities he is taking on.    He was in the emergency room concerned he was having a heart attack found to have GI issues and a large aortic root.  That now needs to be addressed before he can have knee surgery.  He continues to going every 2 weeks to have fluid removed from his left knee, 2-week times ago 140 cm.  They will of surgery do with the meniscal tear.  Having some problems with his right knee as well.    His back pain has been more of a challenge as he has been more active.  He did have a radiofrequency ablation in December which was helpful and understands he can repeat that every 4 to 6 months would like to schedule in June.    Continues with ketamine finding that useful, wanted 20 mg lozenge 1/2 lozenge 4 times a day.  Describes the dose to make his mouth and face numb so that he does not talk to people for 30 minutes after each dose.  Is been use along with the medical cannabis and trying different preparations.    Continues with the Toradol injection every 3 to 4 days.  He can tells when that is wearing off.  He does not take Naprosyn on those days.  Using muscle relaxants as well.      Current Outpatient Medications:      albuterol (PROAIR HFA/PROVENTIL HFA/VENTOLIN HFA) 108 (90 Base) MCG/ACT inhaler, INHALE 2 PUFFS BY MOUTH EVERY 4 HOURS AS NEEDED, Disp: 8.5 g, Rfl: 5     aspirin 81 MG EC tablet, Take 81 mg by mouth daily, Disp: , Rfl:      cyclobenzaprine (FLEXERIL) 10 MG tablet, TAKE 1 TABLET(10 MG) BY MOUTH THREE TIMES DAILY AS NEEDED FOR MUSCLE SPASMS, Disp: 60 tablet, Rfl: 0     hydrochlorothiazide (HYDRODIURIL) 25 MG tablet, TAKE 1 TABLET(25 MG) BY MOUTH DAILY, Disp: 90 tablet, Rfl: 3     ketamine HCl POWD, Ketamine 120 mg one-half under tongue 4 times a day, Disp: 60 g, Rfl: 2      ketorolac (TORADOL) 30 MG/ML injection, Inject 1 mL (30 mg) into the muscle every 6 hours as needed for moderate pain No more than two doses per week, Disp: 8 mL, Rfl: 3     lisinopril (ZESTRIL) 20 MG tablet, Take 1 tablet (20 mg) by mouth daily, Disp: 90 tablet, Rfl: 3     medical cannabis (Patient's own supply), See Admin Instructions (The purpose of this order is to document that the patient reports taking medical cannabis.  This is not a prescription, and is not used to certify that the patient has a qualifying medical condition.) Vaping Oil andFlower leaf; 3 puffs inhaled every 3 hours as needed, Disp: , Rfl:      naproxen (NAPROSYN) 500 MG tablet, TAKE 1 TABLET(500 MG) BY MOUTH TWICE DAILY WITH MEALS AS NEEDED FOR PAIN, Disp: 28 tablet, Rfl: 1     omeprazole (PRILOSEC) 20 MG capsule, Take 20 mg by mouth every morning OTC, Disp: , Rfl:      B complex-vitamin C-folic acid 400 mcg tablet, [B COMPLEX-VITAMIN C-FOLIC ACID 400 MCG TABLET] Take 1 tablet by mouth daily., Disp: 30 tablet, Rfl: 5     fish oil-omega-3 fatty acids (FISH OIL) 300-1,000 mg capsule, Take 2 g by mouth daily OTC (Patient not taking: Reported on 4/25/2022), Disp: , Rfl:      pregabalin (LYRICA) 100 MG capsule, Take 1 capsule (100 mg) by mouth daily, Disp: 30 capsule, Rfl: 0     Vitamin D3 (VITAMIN D3) 25 mcg (1000 units) tablet, Take 2 tablets (50 mcg) by mouth 2 times daily, Disp: 120 tablet, Rfl: 0      Review of Systems   General, psych, musculoskeletal, bowels and bladder otherwise normal other than above.          OBJECTIVE   BP (!) 171/102   Pulse 106   Ht 1.829 m (6')   Wt 103.8 kg (228 lb 14.4 oz)   SpO2 97%   BMI 31.04 kg/m        Physical Exam  General:  Normal appearance, no apparent distress  Cardiovascular: Normal rate  Lungs: Pulmonary effort is normal, speaking in full sentences  MSK: Left knee indeed edematous.  Skin:. No concerning rashes or lesions.  Neurologic: No focal deficit, alert and oriented x3  Psychiatric: normal  mood and affect, cooperative      Assessment: Knee pain, osteoarthritis and meniscal tear.  Planning surgery, now to address other cardiovascular concerns.    The above regimen has helped him maintain appointment with minimal side effects.    Total time more than 20 minutes        Again, thank you for allowing me to participate in the care of your patient.        Sincerely,        CHITO HARKINS MD

## 2022-04-26 ENCOUNTER — TELEPHONE (OUTPATIENT)
Dept: PALLIATIVE MEDICINE | Facility: OTHER | Age: 52
End: 2022-04-26

## 2022-04-26 DIAGNOSIS — M17.12 PRIMARY OSTEOARTHRITIS OF LEFT KNEE: ICD-10-CM

## 2022-04-26 NOTE — TELEPHONE ENCOUNTER
PA request from the pharmacy  ketorolac (TORADOL) 30 MG/ML injection 8 mL 3 4/25/2022  No   Sig - Route: Inject 1 mL (30 mg) into the muscle every 6 hours as needed for moderate pain No more than two doses per week - Intramuscular   Sent to pharmacy as: Ketorolac Tromethamine 30 MG/ML Injection Solution (TORADOL)   Class: E-Prescribe   Order: [id number]   E-Prescribing Status: Receipt confirmed by pharmacy (4/25/2022 11:52 AM CDT)

## 2022-04-28 NOTE — TELEPHONE ENCOUNTER
PA Initiation    Medication:   Insurance Company: [employer] - Phone [phone] Fax [phone]  Pharmacy Filling the Rx: [id number] - [place], [place] - [address] [place] [address] AT [facility] [address]  Filling Pharmacy Phone: [phone]  Filling Pharmacy Fax:    Start Date: 4/28/2022    Central Prior Authorization Team   Phone: [phone]

## 2022-04-29 NOTE — TELEPHONE ENCOUNTER
PRIOR AUTHORIZATION DENIED    Medication: Ketorolac Injectible    Denial Date: 4/29/2022    Denial Rational:       Appeal Information:

## 2022-05-12 ENCOUNTER — MYC MEDICAL ADVICE (OUTPATIENT)
Dept: FAMILY MEDICINE | Facility: CLINIC | Age: 52
End: 2022-05-12
Payer: COMMERCIAL

## 2022-05-19 DIAGNOSIS — M51.26 LUMBAR DISC HERNIATION: ICD-10-CM

## 2022-05-19 DIAGNOSIS — S29.011D INTERCOSTAL MUSCLE STRAIN, SUBSEQUENT ENCOUNTER: ICD-10-CM

## 2022-05-19 DIAGNOSIS — M54.16 LUMBAR RADICULITIS: ICD-10-CM

## 2022-05-19 DIAGNOSIS — M47.816 SPONDYLOSIS OF LUMBAR REGION WITHOUT MYELOPATHY OR RADICULOPATHY: ICD-10-CM

## 2022-05-20 RX ORDER — CYCLOBENZAPRINE HCL 10 MG
TABLET ORAL
Qty: 60 TABLET | Refills: 0 | Status: SHIPPED | OUTPATIENT
Start: 2022-05-20 | End: 2022-08-05

## 2022-05-20 RX ORDER — NAPROXEN 500 MG/1
TABLET ORAL
Qty: 28 TABLET | Refills: 1 | Status: SHIPPED | OUTPATIENT
Start: 2022-05-20 | End: 2022-09-21

## 2022-05-20 NOTE — TELEPHONE ENCOUNTER
"Routing refill request to provider for review/approval because:  Drug not on the [facility] refill protocol   BP not in range.    Last Written Prescription Date:  4/4/22  Last Fill Quantity: 60,  # refills: 0   Last office visit provider:  1/7/22    Last Written Prescription Date:  2/16/22  Last Fill Quantity: 28,  # refills: 1   Last office visit provider:  1/7/22     Requested Prescriptions   Pending Prescriptions Disp Refills     cyclobenzaprine (FLEXERIL) 10 MG tablet [Pharmacy Med Name: CYCLOBENZAPRINE 10MG TABLETS] 60 tablet 0     Sig: TAKE 1 TABLET(10 MG) BY MOUTH THREE TIMES DAILY AS NEEDED FOR MUSCLE SPASMS       There is no refill protocol information for this order        naproxen (NAPROSYN) 500 MG tablet [Pharmacy Med Name: NAPROXEN 500MG TABLETS] 28 tablet 1     Sig: TAKE 1 TABLET(500 MG) BY MOUTH TWICE DAILY WITH MEALS AS NEEDED FOR PAIN       NSAID Medications Failed - 5/20/2022  7:41 AM        Failed - Blood pressure under 140/90 in past 12 months     BP Readings from Last 3 Encounters:   04/25/22 (!) 192/99   01/12/22 (!) 171/86   01/07/22 (!) 156/90                 Passed - Normal ALT on file in past 12 months     Recent Labs   Lab Test 01/01/22  1607   ALT 28             Passed - Normal AST on file in past 12 months     Recent Labs   Lab Test 01/01/22  1607   AST 22             Passed - Recent (12 mo) or future (30 days) visit within the authorizing provider's specialty     Patient has had an office visit with the authorizing provider or a provider within the authorizing providers department within the previous 12 mos or has a future within next 30 days. See \"Patient Info\" tab in inbasket, or \"Choose Columns\" in Meds & Orders section of the refill encounter.              Passed - Patient is age 6-64 years        Passed - Normal CBC on file in past 12 months     Recent Labs   Lab Test 01/01/22  1607   WBC 7.3   RBC 5.27   HGB 14.9   HCT 45.1                    Passed - Medication is active on med " list        Passed - Normal serum creatinine on file in past 12 months     Recent Labs   Lab Test 01/01/22  1607   CR 1.20       Ok to refill medication if creatinine is low               Delmer Berger RN 05/20/22 7:42 AM

## 2022-05-25 ENCOUNTER — OFFICE VISIT (OUTPATIENT)
Dept: FAMILY MEDICINE | Facility: CLINIC | Age: 52
End: 2022-05-25
Payer: COMMERCIAL

## 2022-05-25 ENCOUNTER — TELEPHONE (OUTPATIENT)
Dept: FAMILY MEDICINE | Facility: CLINIC | Age: 52
End: 2022-05-25

## 2022-05-25 VITALS
DIASTOLIC BLOOD PRESSURE: 94 MMHG | OXYGEN SATURATION: 100 % | RESPIRATION RATE: 24 BRPM | SYSTOLIC BLOOD PRESSURE: 166 MMHG | BODY MASS INDEX: 45.8 KG/M2 | TEMPERATURE: 99.4 F | WEIGHT: 315 LBS | HEART RATE: 94 BPM

## 2022-05-25 DIAGNOSIS — M47.816 SPONDYLOSIS OF LUMBAR REGION WITHOUT MYELOPATHY OR RADICULOPATHY: ICD-10-CM

## 2022-05-25 DIAGNOSIS — F33.1 MODERATE EPISODE OF RECURRENT MAJOR DEPRESSIVE DISORDER (H): ICD-10-CM

## 2022-05-25 DIAGNOSIS — E66.01 MORBID OBESITY (H): ICD-10-CM

## 2022-05-25 DIAGNOSIS — F10.10 ALCOHOL ABUSE: ICD-10-CM

## 2022-05-25 DIAGNOSIS — R10.84 ABDOMINAL PAIN, GENERALIZED: ICD-10-CM

## 2022-05-25 DIAGNOSIS — Z11.59 NEED FOR HEPATITIS C SCREENING TEST: Primary | ICD-10-CM

## 2022-05-25 DIAGNOSIS — K76.0 FATTY LIVER: ICD-10-CM

## 2022-05-25 LAB
ALBUMIN SERPL-MCNC: 3.8 G/DL (ref 3.5–5)
ALP SERPL-CCNC: 88 U/L (ref 45–120)
ALT SERPL W P-5'-P-CCNC: 33 U/L (ref 0–45)
ANION GAP SERPL CALCULATED.3IONS-SCNC: 11 MMOL/L (ref 5–18)
AST SERPL W P-5'-P-CCNC: 34 U/L (ref 0–40)
BILIRUB DIRECT SERPL-MCNC: 0.1 MG/DL
BILIRUB SERPL-MCNC: 0.5 MG/DL (ref 0–1)
BUN SERPL-MCNC: 15 MG/DL (ref 8–22)
CALCIUM SERPL-MCNC: 9.8 MG/DL (ref 8.5–10.5)
CHLORIDE BLD-SCNC: 105 MMOL/L (ref 98–107)
CO2 SERPL-SCNC: 24 MMOL/L (ref 22–31)
CREAT SERPL-MCNC: 1.24 MG/DL (ref 0.7–1.3)
ERYTHROCYTE [DISTWIDTH] IN BLOOD BY AUTOMATED COUNT: 12.7 % (ref 10–15)
GFR SERPL CREATININE-BSD FRML MDRD: 70 ML/MIN/1.73M2
GLUCOSE BLD-MCNC: 114 MG/DL (ref 70–125)
HCT VFR BLD AUTO: 42.5 % (ref 40–53)
HGB BLD-MCNC: 14.7 G/DL (ref 13.3–17.7)
MCH RBC QN AUTO: 29.5 PG (ref 26.5–33)
MCHC RBC AUTO-ENTMCNC: 34.6 G/DL (ref 31.5–36.5)
MCV RBC AUTO: 85 FL (ref 78–100)
PLATELET # BLD AUTO: 180 10E3/UL (ref 150–450)
POTASSIUM BLD-SCNC: 4.5 MMOL/L (ref 3.5–5)
PROT SERPL-MCNC: 6.3 G/DL (ref 6–8)
RBC # BLD AUTO: 4.98 10E6/UL (ref 4.4–5.9)
SODIUM SERPL-SCNC: 140 MMOL/L (ref 136–145)
WBC # BLD AUTO: 5.3 10E3/UL (ref 4–11)

## 2022-05-25 PROCEDURE — 85027 COMPLETE CBC AUTOMATED: CPT | Performed by: NURSE PRACTITIONER

## 2022-05-25 PROCEDURE — 86803 HEPATITIS C AB TEST: CPT | Performed by: NURSE PRACTITIONER

## 2022-05-25 PROCEDURE — 82248 BILIRUBIN DIRECT: CPT | Performed by: NURSE PRACTITIONER

## 2022-05-25 PROCEDURE — 99214 OFFICE O/P EST MOD 30 MIN: CPT | Performed by: NURSE PRACTITIONER

## 2022-05-25 PROCEDURE — 80053 COMPREHEN METABOLIC PANEL: CPT | Performed by: NURSE PRACTITIONER

## 2022-05-25 PROCEDURE — 36415 COLL VENOUS BLD VENIPUNCTURE: CPT | Performed by: NURSE PRACTITIONER

## 2022-05-25 ASSESSMENT — COLUMBIA-SUICIDE SEVERITY RATING SCALE - C-SSRS
1. WITHIN THE PAST MONTH, HAVE YOU WISHED YOU WERE DEAD OR WISHED YOU COULD GO TO SLEEP AND NOT WAKE UP?: YES
2. IN THE PAST MONTH, HAVE YOU ACTUALLY HAD ANY THOUGHTS OF KILLING YOURSELF?: NO
6. HAVE YOU EVER DONE ANYTHING, STARTED TO DO ANYTHING, OR PREPARED TO DO ANYTHING TO END YOUR LIFE?: NO

## 2022-05-25 ASSESSMENT — PATIENT HEALTH QUESTIONNAIRE - PHQ9
SUM OF ALL RESPONSES TO PHQ QUESTIONS 1-9: 16
10. IF YOU CHECKED OFF ANY PROBLEMS, HOW DIFFICULT HAVE THESE PROBLEMS MADE IT FOR YOU TO DO YOUR WORK, TAKE CARE OF THINGS AT HOME, OR GET ALONG WITH OTHER PEOPLE: EXTREMELY DIFFICULT
SUM OF ALL RESPONSES TO PHQ QUESTIONS 1-9: 16

## 2022-05-25 ASSESSMENT — PAIN SCALES - GENERAL: PAINLEVEL: EXTREME PAIN (8)

## 2022-05-25 NOTE — LETTER
[facility]  [address]  [facility] [address]  Phone: [phone]  Fax: [phone]    May 25, 2022        Shaun Calvo Jr.  [facility] [place] [address]          To whom it may concern:    RE: Shaun Calvo Jr.    Patient was seen and treated today at our clinic and missed work.  He may return to work at a reduced schedule of 4 hours a day until he is reevaluated in 2 weeks.    Please contact me for questions or concerns.      Sincerely,        PAOLA Harris CNP

## 2022-05-25 NOTE — PROGRESS NOTES
Assessment & Plan       1. Abdominal pain, generalized  2. Fatty liver  3. Alcohol abuse  Reviewed summary, imaging and labs from urgent care visit on 5/9 with the patient today.  See below for detailed report.  Encouraged cessation of alcohol use.  We will obtain hepatic panel along with complete blood count today and place referral to gastroenterology in the event that symptoms fail to improve.  We discussed importance of seeking out immediate evaluation if he develops any severe symptoms of abdominal pain.  He is comfortable with this plan.  - Hepatic panel (Albumin, ALT, AST, Bili, Alk Phos, TP); Future  - CBC with platelets; Future  - Hepatic panel (Albumin, ALT, AST, Bili, Alk Phos, TP)  - CBC with platelets    2. Need for hepatitis C screening test  - Hepatitis C Screen Reflex to HCV RNA Quant and Genotype; Future  - Hepatitis C Screen Reflex to HCV RNA Quant and Genotype    3. Morbid obesity (H)  Encourage healthy lifestyle modifications in regards to diet and exercise to promote weight loss.  - CBC with platelets; Future  - Basic metabolic panel  (Ca, Cl, CO2, Creat, Gluc, K, Na, BUN); Future  - CBC with platelets  - Basic metabolic panel  (Ca, Cl, CO2, Creat, Gluc, K, Na, BUN)    4. Spondylosis of lumbar region without myelopathy or radiculopathy  Continues to follow with pain clinic for management of chronic pain.    5. Moderate episode of recurrent major depressive disorder (H)  Passive suicidal ideation that patient states has been a lifelong issue for him.  Not interested in seeing psychiatry or psychology or discussing medications.  Per patient he has not tolerated antidepressants in the past.    Depression Screening Follow Up    PHQ 5/25/2022   PHQ-9 Total Score 16   Q9: Thoughts of better off dead/self-harm past 2 weeks Several days   F/U: Thoughts of suicide or self-harm Yes   F/U: Self harm-plan No   F/U: Self-harm action No   F/U: Safety concerns No           C-SSRS (Brief Slemp) 5/25/2022    Within the last month, have you wished you were dead or wished you could go to sleep and not wake up? Yes   Within the last month, have you had any actual thoughts of killing yourself? No   Within the last month, have you ever done anything, started to do anything, or prepared to do anything to end your life? No       Follow Up        Follow Up Actions Taken  Crisis resource information provided in the After Visit Summary  Patient declined referral.    Discussed the following ways the patient can remain in a safe environment:  remove alcohol  Work on weight loss  Regular exercise    No follow-ups on file.    PAOLA Harris CNP  [facility] ALISIA Sandoval is a 52 year old who presents for the following health issues : Follow-up urgent care, abdominal pain    History of Present Illness       Reason for visit:  Abdominal pain and an abnormal ultrasound results    He eats 0-1 servings of fruits and vegetables daily.He consumes 5 sweetened beverage(s) daily.He exercises with enough effort to increase his heart rate 60 or more minutes per day.  He exercises with enough effort to increase his heart rate 5 days per week. He is missing 3 dose(s) of medications per week.  He is not taking prescribed medications regularly due to cost of medication and remembering to take.    Today's PHQ-9         PHQ-9 Total Score: 16    PHQ-9 Q9 Thoughts of better off dead/self-harm past 2 weeks :   Several days  Thoughts of suicide or self harm: (P) Yes  Self-harm Plan:   (P) No  Self-harm Action:     (P) No  Safety concerns for self or others: (P) No    How difficult have these problems made it for you to do your work, take care of things at home, or get along with other people: Extremely difficult     Patient is here today to follow-up on urgent care visit on 5/9/2022.  He was seen for right upper abdominal pain.  He has been having this pain on and off for the last several months.  Pain is worse with  eating and certain movements.  He has not had any nausea, vomiting or changes in his stool or urination pattern.  No systemic symptoms of fevers, chills, night sweats etc.  He does have history of alcohol abuse.  His abdominal pain is stable, not worsening or improving.  He denies abdominal pain currently.  His ultrasound in urgent care indicating mild splenomegaly, diffuse fatty infiltration of the liver and new mild intra and extrahepatic biliary dilatation.  It was recommended that he be seen in clinic for hepatic function tests.  He reports drinking about 2 drinks a day but also states that he drinks 175 bottle over 3 days on average.  He has had a great deal of stress recently after losing his brother unexpectedly.  He is having to care for his brothers daughter and children.  He currently works at [employer] as a .  This is causing some level of stress as well.  He does have fairly severe depression and anxiety which has been worse recently.  He denies any actively suicidal thoughts but states that he constantly has passive suicidal thoughts since childhood.  Prior suicide attempt several years ago.  He states that he would never attempt suicide again.  He is not currently following with psychiatry or psychologist and is not interested at this time.  Per patient, he has not tolerated antidepressants in the past.      Abdominal ultrasound:   IMPRESSION:  1.  Mildly limited exam due to body habitus.  2.  Echogenic, mildly enlarged liver suggesting diffuse fatty infiltration.  3.  New mild intra and extrahepatic biliary dilatation. Recommend correlation with the patient's liver function tests to exclude obstruction.  4.  Mild splenomegaly.              Review of Systems   Review of Systems - pertinent positives noted in HPI, otherwise ROS is negative.        Objective    BP (!) 166/94 (BP Location: Right arm, Patient Position: Sitting, Cuff Size: Adult Large)   Pulse 94   Temp 99.4  F (37.4  C)   Resp 24   Wt  (!) 153.2 kg (337 lb 11.2 oz)   SpO2 100%   BMI 45.80 kg/m    Body mass index is 45.8 kg/m .  Physical Exam   GENERAL: Overweight male, alert no distress, nontoxic appearing  EYES: Eyes grossly normal to inspection, PERRL and conjunctivae and sclerae normal  HENT: ear canals and TM's normal, nose and mouth without ulcers or lesions  NECK: no adenopathy, no asymmetry, masses, or scars and thyroid normal to palpation  RESP: lungs clear to auscultation - no rales, rhonchi or wheezes  CV: regular rate and rhythm, normal S1 S2, no S3 or S4, no murmur, click or rub, no peripheral edema and peripheral pulses strong  ABDOMEN: soft, nontender, no hepatosplenomegaly, no masses and bowel sounds normal  MS: no gross musculoskeletal defects noted, no edema  SKIN: no suspicious lesions or rashes  NEURO: Normal strength and tone, mentation intact and speech normal  PSYCH: mentation appears normal, affect is tearful.  Denies active suicidal ideation              Answers for HPI/ROS submitted by the patient on 5/25/2022  If you checked off any problems, how difficult have these problems made it for you to do your work, take care of things at home, or get along with other people?: Extremely difficult  PHQ9 TOTAL SCORE: 16  How many servings of fruits and vegetables do you eat daily?: 0-1  On average, how many sweetened beverages do you drink each day (Examples: soda, juice, sweet tea, etc.  Do NOT count diet or artificially sweetened beverages)?: 5  How many minutes a day do you exercise enough to make your heart beat faster?: 60 or more  How many days a week do you exercise enough to make your heart beat faster?: 5  How many days per week do you miss taking your medication?: 3  What makes it hard for you to take your medication every day?: cost of medication, remembering to take  What is the reason for your visit today?: Abdominal pain and an abnormal ultrasound results

## 2022-05-25 NOTE — TELEPHONE ENCOUNTER
Reason for Call:  appointment    Detailed comments: Patient states that he does not remember when he was suppose to follow up. He thinks Shivani said one week.     Please advise as I did not see a follow up in office notes.     Phone Number Patient can be reached at: Cell number on file:    Telephone Information:   Mobile [phone]       Best Time: any    Can we leave a detailed message on this number? YES    Call taken on 5/25/2022 at 12:56 PM by Jennifer Anna

## 2022-05-26 LAB — HCV AB SERPL QL IA: NONREACTIVE

## 2022-05-26 NOTE — TELEPHONE ENCOUNTER
I would recommend following up in 1-2 weeks to determine ability to return to work (at full capacity) and make sure his symptoms are stable/improving. Thanks!
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
## 2022-06-07 ENCOUNTER — OFFICE VISIT (OUTPATIENT)
Dept: FAMILY MEDICINE | Facility: CLINIC | Age: 52
End: 2022-06-07
Payer: COMMERCIAL

## 2022-06-07 VITALS
HEART RATE: 79 BPM | DIASTOLIC BLOOD PRESSURE: 110 MMHG | WEIGHT: 315 LBS | BODY MASS INDEX: 46 KG/M2 | SYSTOLIC BLOOD PRESSURE: 186 MMHG

## 2022-06-07 DIAGNOSIS — I10 ESSENTIAL HYPERTENSION: ICD-10-CM

## 2022-06-07 DIAGNOSIS — K83.8 DILATED BILE DUCT: ICD-10-CM

## 2022-06-07 DIAGNOSIS — R10.11 RUQ ABDOMINAL PAIN: Primary | ICD-10-CM

## 2022-06-07 LAB
ALBUMIN SERPL-MCNC: 3.9 G/DL (ref 3.5–5)
ALP SERPL-CCNC: 89 U/L (ref 45–120)
ALT SERPL W P-5'-P-CCNC: 24 U/L (ref 0–45)
AST SERPL W P-5'-P-CCNC: 23 U/L (ref 0–40)
BILIRUB DIRECT SERPL-MCNC: 0.2 MG/DL
BILIRUB SERPL-MCNC: 0.4 MG/DL (ref 0–1)
PROT SERPL-MCNC: 6.4 G/DL (ref 6–8)

## 2022-06-07 PROCEDURE — 99214 OFFICE O/P EST MOD 30 MIN: CPT | Performed by: FAMILY MEDICINE

## 2022-06-07 PROCEDURE — 80076 HEPATIC FUNCTION PANEL: CPT | Performed by: FAMILY MEDICINE

## 2022-06-07 PROCEDURE — 36415 COLL VENOUS BLD VENIPUNCTURE: CPT | Performed by: FAMILY MEDICINE

## 2022-06-07 RX ORDER — LISINOPRIL 40 MG/1
40 TABLET ORAL DAILY
Qty: 20 TABLET | Refills: 0 | Status: SHIPPED | OUTPATIENT
Start: 2022-06-07 | End: 2022-06-28

## 2022-06-07 ASSESSMENT — PAIN SCALES - GENERAL: PAINLEVEL: NO PAIN (0)

## 2022-06-07 NOTE — LETTER
June 7, 2022      Shaun Calvo Jr.  [address] [place] [address]        To Whom It May Concern:    Shaun Calvo Jr. Was seen today in clinic for follow-up of symptoms. He is continuing to have symptoms and is scheduled to see a specialist on 6/27 for further evaluation and management. I recommend he be off wok until that visit; if further time off is needed a new letter will be provided.         Sincerely,        Harriet Vo MD

## 2022-06-07 NOTE — PATIENT INSTRUCTIONS
Off work til GI visit on 6/27  Recheck labs today to see if any changes    Blood pressure high - continue hydrochlorothiazide 25mg daily, stop lisinopril 20mg daily and start 40mg daily for 2 weeks and then reassess  -home blood pressure cuff prescribed so you can keep an eye on things at home

## 2022-06-16 DIAGNOSIS — K76.0 FATTY LIVER: Primary | ICD-10-CM

## 2022-06-17 NOTE — TELEPHONE ENCOUNTER
RECORDS RECEIVED FROM: Internal   Appt Date: 06.27.2022   NOTES STATUS DETAILS   OFFICE NOTE from referring provider Internal 05.25.2022 Shivani Shaw APRN CNP   OFFICE NOTES from other specialists     DISCHARGE SUMMARY from hospital     MEDICATION LIST Internal    LIVER BIOSPY (IF APPLICABLE)      PATHOLOGY REPORTS      IMAGING     ENDOSCOPY (IF AVAILABLE)     COLONOSCOPY (IF AVAILABLE)     ULTRASOUND LIVER Care Everywhere  05.12.2022, 08.13.2019 US ABD COMPLETE     CT OF ABDOMEN     MRI OF LIVER     FIBROSCAN, US ELASTOGRAPHY, FIBROSIS SCAN, MR ELASTOGRAPHY     LABS     HEPATIC PANEL (LIVER PANEL) Internal 06.07.2022   BASIC METABOLIC PANEL Internal 05.25.2022   COMPLETE METABOLIC PANEL Internal 05.25.2022   COMPLETE BLOOD COUNT (CBC) Internal 05.25.2022   INTERNATIONAL NORMALIZED RATIO (INR)     HEPATITIS C ANTIBODY     HEPATITIS C VIRAL LOAD/PCR     HEPATITIS C GENOTYPE     HEPATITIS B SURFACE ANTIGEN     HEPATITIS B SURFACE ANTIBODY     HEPATITIS B DNA QUANT LEVEL     HEPATITIS B CORE ANTIBODY       Action 06.17.2022 RM   Action Taken Called [facility] to get images pushed called [phone] [facility] to get images, images received and uploaded to chart.

## 2022-06-27 ENCOUNTER — OFFICE VISIT (OUTPATIENT)
Dept: GASTROENTEROLOGY | Facility: CLINIC | Age: 52
End: 2022-06-27
Attending: NURSE PRACTITIONER
Payer: COMMERCIAL

## 2022-06-27 ENCOUNTER — PRE VISIT (OUTPATIENT)
Dept: GASTROENTEROLOGY | Facility: CLINIC | Age: 52
End: 2022-06-27
Payer: COMMERCIAL

## 2022-06-27 ENCOUNTER — LAB (OUTPATIENT)
Dept: LAB | Facility: CLINIC | Age: 52
End: 2022-06-27
Attending: NURSE PRACTITIONER
Payer: COMMERCIAL

## 2022-06-27 VITALS
WEIGHT: 315 LBS | BODY MASS INDEX: 45.77 KG/M2 | TEMPERATURE: 98.4 F | HEART RATE: 82 BPM | OXYGEN SATURATION: 94 % | DIASTOLIC BLOOD PRESSURE: 106 MMHG | SYSTOLIC BLOOD PRESSURE: 170 MMHG

## 2022-06-27 DIAGNOSIS — K76.0 FATTY LIVER: ICD-10-CM

## 2022-06-27 DIAGNOSIS — R10.84 ABDOMINAL PAIN, GENERALIZED: ICD-10-CM

## 2022-06-27 LAB
ALBUMIN SERPL-MCNC: 4.1 G/DL (ref 3.4–5)
ALP SERPL-CCNC: 91 U/L (ref 40–150)
ALT SERPL W P-5'-P-CCNC: 30 U/L (ref 0–70)
ANION GAP SERPL CALCULATED.3IONS-SCNC: 7 MMOL/L (ref 3–14)
AST SERPL W P-5'-P-CCNC: 19 U/L (ref 0–45)
BILIRUB DIRECT SERPL-MCNC: <0.1 MG/DL (ref 0–0.2)
BILIRUB SERPL-MCNC: 0.4 MG/DL (ref 0.2–1.3)
BUN SERPL-MCNC: 11 MG/DL (ref 7–30)
CALCIUM SERPL-MCNC: 9 MG/DL (ref 8.5–10.1)
CHLORIDE BLD-SCNC: 108 MMOL/L (ref 94–109)
CO2 SERPL-SCNC: 26 MMOL/L (ref 20–32)
CREAT SERPL-MCNC: 1.14 MG/DL (ref 0.66–1.25)
ERYTHROCYTE [DISTWIDTH] IN BLOOD BY AUTOMATED COUNT: 12.3 % (ref 10–15)
GFR SERPL CREATININE-BSD FRML MDRD: 77 ML/MIN/1.73M2
GLUCOSE BLD-MCNC: 104 MG/DL (ref 70–99)
HBV CORE AB SERPL QL IA: NONREACTIVE
HBV SURFACE AG SERPL QL IA: NONREACTIVE
HCT VFR BLD AUTO: 44.2 % (ref 40–53)
HGB BLD-MCNC: 14.9 G/DL (ref 13.3–17.7)
INR PPP: 0.95 (ref 0.85–1.15)
MCH RBC QN AUTO: 28.5 PG (ref 26.5–33)
MCHC RBC AUTO-ENTMCNC: 33.7 G/DL (ref 31.5–36.5)
MCV RBC AUTO: 85 FL (ref 78–100)
PLATELET # BLD AUTO: 215 10E3/UL (ref 150–450)
POTASSIUM BLD-SCNC: 3.8 MMOL/L (ref 3.4–5.3)
PROT SERPL-MCNC: 7.2 G/DL (ref 6.8–8.8)
RBC # BLD AUTO: 5.23 10E6/UL (ref 4.4–5.9)
SODIUM SERPL-SCNC: 141 MMOL/L (ref 133–144)
WBC # BLD AUTO: 6.9 10E3/UL (ref 4–11)

## 2022-06-27 PROCEDURE — G0463 HOSPITAL OUTPT CLINIC VISIT: HCPCS

## 2022-06-27 PROCEDURE — 85610 PROTHROMBIN TIME: CPT | Performed by: PATHOLOGY

## 2022-06-27 PROCEDURE — 99000 SPECIMEN HANDLING OFFICE-LAB: CPT | Performed by: PATHOLOGY

## 2022-06-27 PROCEDURE — 87340 HEPATITIS B SURFACE AG IA: CPT | Mod: 90 | Performed by: PATHOLOGY

## 2022-06-27 PROCEDURE — 85027 COMPLETE CBC AUTOMATED: CPT | Performed by: PATHOLOGY

## 2022-06-27 PROCEDURE — 82248 BILIRUBIN DIRECT: CPT | Performed by: PATHOLOGY

## 2022-06-27 PROCEDURE — 82103 ALPHA-1-ANTITRYPSIN TOTAL: CPT | Mod: 90 | Performed by: PATHOLOGY

## 2022-06-27 PROCEDURE — 99204 OFFICE O/P NEW MOD 45 MIN: CPT | Performed by: PHYSICIAN ASSISTANT

## 2022-06-27 PROCEDURE — 81332 SERPINA1 GENE: CPT | Mod: 90 | Performed by: PATHOLOGY

## 2022-06-27 PROCEDURE — 80053 COMPREHEN METABOLIC PANEL: CPT | Performed by: PATHOLOGY

## 2022-06-27 PROCEDURE — 86704 HEP B CORE ANTIBODY TOTAL: CPT | Mod: 90 | Performed by: PATHOLOGY

## 2022-06-27 PROCEDURE — 36415 COLL VENOUS BLD VENIPUNCTURE: CPT | Performed by: PATHOLOGY

## 2022-06-27 PROCEDURE — 86706 HEP B SURFACE ANTIBODY: CPT | Mod: 90 | Performed by: PATHOLOGY

## 2022-06-27 ASSESSMENT — PAIN SCALES - GENERAL: PAINLEVEL: EXTREME PAIN (8)

## 2022-06-27 NOTE — PATIENT INSTRUCTIONS
Physical Activity   - Increase physical activity as able to help aid in weight loss    Limit Carbohydrates In Diet:   What foods are Carbohydrates?    - Main groups: Grains, Fruits, Starchy Vegetables (corn, potatoes, peas, beans) and Sweets/Desserts    When you do have carbohydrates - be mindful of the portions.   - Have ones with more fiber in the them (brown rice, whole grain breads/pastas)   - Fruits - choose ones with edible skins (apples, pears, etc) or seeds (berries)

## 2022-06-27 NOTE — LETTER
6/27/2022         RE: Shaun Calvo Jr.  [address] [place] [address]        Dear Colleague,    Thank you for referring your patient, Shaun Calvo Jr., to the [facility] [place]. Please see a copy of my visit note below.    Hepatology Clinic note  Shaun Calvo Jr.   Date of Birth 1970  Date of Service 6/27/2022    REASON FOR CONSULTATION: Fatty liver  REFERRING PROVIDER: Shivani Shaw CNP          Assessment/plan:   Shaun Calvo Jr. is a 52 year old male with history of hepatic steatosis on imaging.  Risk factors for fatty liver disease includes: obesity,  HTN, hyperlipidemia. Currently liver function is normal and he has no stigmata of advanced liver disease.  We discussed the pathophysiology and natural history of nonalcoholic fatty liver disease and cirrhosis. We discussed NAFLD treatment includes slow gradual weight loss, as well as optimization of risk factors including hyperlipidemia and blood glucose as needed. Given normal transaminase, do not feel that extensive hepatobiliary disease work-up is warranted at this time.      - Recommend slow gradual weight loss   - Maintain good control of cholesterol (No contraindication to starting a statin with LFT elevations)   - Optimize blood glucose as needed. Can consider use of GLP-1 inhibitor for both insulin resistance and help with weight loss.   - Improve nutrition:  emphasizing limit carbohydrates, especially simple carbohydrates.  - Increase physical activity: maintain physical activity more than 150 minutes a week  - Labs:  alpha-1-antitrypsin deficiency  - Limit alcohol use to <3 drinks a week and less than one a day.  - Follow up in six months     Jerri Maldonado PA-C   [facility]     Total time for E/M services performed on the date of the encounter 45 minutes.  This included review of previous: clinic visits, hospital records, lab results, imaging studies, and procedural  "documentation. Time also includes patient visit, documentation and discussion with other providers.  The findings from this review are summarized in the above note.   -----------------------------------------------------       HPI:   Shaun Calvo Jr. is a 52 year old male  presenting for the evaluation of elevated LFT's.     LFT's were first noticed to be abnormal     Large spike being \"lot of pressure\" Pain is there all the time  Worse with bending, lifting and moving.     Working on losing weight and eating a bit healthier. Has been reducing portions. Usually 1.5 meals a day.     Regular bowel movements.     Hasn't been able to get under 300 lbs.     Patient denies jaundice, lower extremity edema, abdominal distension or confusion.  Patient also denies melena, hematochezia or hematemesis. Patient denies weight loss, fevers, sweats or chills.    6-8 hours of work does not a lot of work. Physical activity. No other outside walking.     PMH: Fibromyalgia, chronic low back pain, obsessive compulsive disorder, shoulder impingement, PTSH, anxiety, RLS, depression morbid obesity,     SMH: Reconstructive surgery jaw     Medications:   See below     Stopped smoking cigarettes in 2001. Typically drinks a few 2 ounce drink a night but then cut down the last few weeks. From age [age], he did not drink. Drank more regularly with his brother who recently passed away. No previous IV/IN drug use. Currently works at [employer]. No known family history of liver disease or liver cancer.     Previous work-up:  HCV antibody nonreactive  HBV SAb <11 - Not immune  HBV SAg nonreactive  HBV CAb nonreactive  KASI  F-actin  Iron panel:   Ferritin:   Iron Sats:   TTG - <0.6; 0.4   Gliadin IgA and IgG   Alpha-1-antripsin:   Ceruloplasmin   TSH   Cholesterol Total   HDL  LDL  Triglycerides - 216   Hemoglobin A1c     Medical hx Surgical hx   Past Medical History:   Diagnosis Date     Anxiety      Asthma      Atrial fibrillation (H)      " Depression      Hypertension     No past surgical history on file.              Medications:     Current Outpatient Medications   Medication     albuterol (PROAIR HFA/PROVENTIL HFA/VENTOLIN HFA) 108 (90 Base) MCG/ACT inhaler     aspirin 81 MG EC tablet     B complex-vitamin C-folic acid 400 mcg tablet     cyclobenzaprine (FLEXERIL) 10 MG tablet     fish oil-omega-3 fatty acids (FISH OIL) 300-1,000 mg capsule     hydrochlorothiazide (HYDRODIURIL) 25 MG tablet     ketamine HCl POWD     ketorolac (TORADOL) 30 MG/ML injection     lisinopril (ZESTRIL) 20 MG tablet     lisinopril (ZESTRIL) 40 MG tablet     medical cannabis (Patient's own supply)     naproxen (NAPROSYN) 500 MG tablet     omeprazole (PRILOSEC) 20 MG capsule     pregabalin (LYRICA) 100 MG capsule     Vitamin D3 (VITAMIN D3) 25 mcg (1000 units) tablet     No current facility-administered medications for this visit.            Allergies:     Allergies   Allergen Reactions     Penicillins Anaphylaxis            Social History:     Social History     Socioeconomic History     Marital status:      Spouse name: Not on file     Number of children: Not on file     Years of education: Not on file     Highest education level: Not on file   Occupational History     Not on file   Tobacco Use     Smoking status: Former Smoker     Smokeless tobacco: Never Used   Vaping Use     Vaping Use: Never used   Substance and Sexual Activity     Alcohol use: Not on file     Drug use: Not on file     Sexual activity: Yes   Other Topics Concern     Not on file   Social History Narrative    The patient is originally from [place], [place] and went to [employer]. He did not graduate. He has worked as a  for much of his life. 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 Af ter he was released from [employer], he looked for work in [place], [place] but could not find any and subsequently returned to [place]. He  is currently living at the [facility]. He  in 2004. He has a daughter, Mayra Valdez, who is 23 and  with whom he has no contact.       Social Determinants of Health     Financial Resource Strain: Not on file   Food Insecurity: Not on file   Transportation Needs: Not on file   Physical Activity: Not on file   Stress: Not on file   Social Connections: Not on file   Intimate Partner Violence: Not on file   Housing Stability: Not on file            Family History:     Family History   Problem Relation Age of Onset     Heart Disease Father               Review of Systems:   Gen: See HPI     HEENT: No change in vision or hearing, mouth sores, dysphagia, lymph nodes  Resp: No shortness of breath, coughing, hx of asthma  CV: No chest pain, palpitations, syncope   GI: See HPI  : No dysuria, history of stones, urine color    Skin: No rash; no pruritus or psoriasis  MS: No arthralgias, myalgias, joint swelling  Neuro: No memory changes, confusion, numbness    Heme: No difficulty clotting, bruising, bleeding  Psych:  No anxiety, depression, agitation          Physical Exam:   BP (!) 170/106 (BP Location: Right arm, Patient Position: Sitting, Cuff Size: Adult Large)   Pulse 82   Temp 98.4  F (36.9  C) (Oral)   Wt (!) 153.1 kg (337 lb 8 oz)   SpO2 94%   BMI 45.77 kg/m      Gen: A&Ox3, NAD, well developed  HEENT: non-icteric  CV: RRR, no overt murmurs  Lung: CTA Bilatererally, no wheezing or crackles.   Lym- no palpable lymphadenopathy  Abd: soft, NT, ND, no palpable splenomegaly, liver is not palpable.   Ext: no edema, intact pulses.   Skin: No rash,  no palmar erythema, telangiectasias or jaundice  Neuro: grossly intact, no asterixis   Psych: appropriate mood and affects         Data:   Reviewed in person and significant for:    Lab Results   Component Value Date     05/25/2022      Lab Results   Component Value Date    POTASSIUM 4.5 05/25/2022     Lab Results   Component Value Date    CHLORIDE 105  05/25/2022     Lab Results   Component Value Date    CO2 24 05/25/2022     Lab Results   Component Value Date    BUN 15 05/25/2022     Lab Results   Component Value Date    CR 1.24 05/25/2022       Lab Results   Component Value Date    WBC 5.3 05/25/2022     Lab Results   Component Value Date    HGB 14.7 05/25/2022     Lab Results   Component Value Date    HCT 42.5 05/25/2022     Lab Results   Component Value Date    MCV 85 05/25/2022     Lab Results   Component Value Date     05/25/2022       Lab Results   Component Value Date    AST 23 06/07/2022     Lab Results   Component Value Date    ALT 24 06/07/2022     No results found for: BILICONJ   Lab Results   Component Value Date    BILITOTAL 0.4 06/07/2022       Lab Results   Component Value Date    ALBUMIN 3.9 06/07/2022     Lab Results   Component Value Date    PROTTOTAL 6.4 06/07/2022      Lab Results   Component Value Date    ALKPHOS 89 06/07/2022       No results found for: INR      Imaging:        EXAM: CTA CHEST ABDOMEN PELVIS W CONTRAST  LOCATION: [facility]  DATE/TIME: 1/1/2022 5:30 PM     INDICATION: Pain. Radiating pain.  COMPARISON: CTA chest 02/01/2021.  TECHNIQUE: CT angiogram chest abdomen pelvis during arterial phase of injection of IV contrast. 2D and 3D MIP reconstructions were performed by the CT technologist. Dose reduction techniques were used.   CONTRAST: Isovue 370    100ml     FINDINGS:   CT ANGIOGRAM CHEST, ABDOMEN, AND PELVIS: Sinuses of Valsalva measure 4.5 cm. The sinotubular junction measures 3.5 cm. The mid ascending aorta measures up to 3.5 cm. The descending thoracic aorta measures 3.2 cm. The abdominal aorta measures up to 2.3 cm   proximally. No dissection. Incidental retroaortic left renal vein.      No obvious pulmonary embolism.     LUNGS AND PLEURA: Lungs are clear. No pleural effusion or pneumothorax.     MEDIASTINUM/AXILLAE: No thoracic adenopathy. Normal heart size. No pericardial  effusion.     CORONARY ARTERY CALCIFICATION: Compromised by motion.     HEPATOBILIARY: Hepatic steatosis. No opaque gallstones or biliary dilatation.     PANCREAS: Normal.     SPLEEN: Normal.     ADRENAL GLANDS: Normal.     KIDNEYS/BLADDER: Mild circumferential bladder wall thickening. Otherwise, negative.     BOWEL: Decompressed stomach. Unremarkable bowel and appendix. No obstruction or inflammation.     LYMPH NODES: No abdominal or pelvic adenopathy.     PELVIC ORGANS: Unremarkable.     MUSCULOSKELETAL: Unremarkable.     OTHER: No free fluid or air.                                                                       IMPRESSION:     1.  Enlarged sinuses of Valsalva at 4.5 cm.     2.  Otherwise, nonaneurysmal aorta. No aortic dissection or wall thickening.     3.  No pulmonary embolism identified.     4.  Mild urinary bladder wall thickening; correlate for cystitis.     5.  Hepatic steatosis.     6.  No other obvious findings to explain symptoms.         Again, thank you for allowing me to participate in the care of your patient.        Sincerely,        Jerri Maldonado PA-C

## 2022-06-27 NOTE — PROGRESS NOTES
Hepatology Clinic note  Shaun Calvo Jr.   Date of Birth 1970  Date of Service 6/27/2022    REASON FOR CONSULTATION: Fatty liver  REFERRING PROVIDER: Shivani Shaw CNP          Assessment/plan:   Shaun Calvo Jr. is a 52 year old male with history of hepatic steatosis on imaging.  Risk factors for fatty liver disease includes: obesity,  HTN, hyperlipidemia. Currently liver function is normal and he has no stigmata of advanced liver disease.  We discussed the pathophysiology and natural history of nonalcoholic fatty liver disease and cirrhosis. We discussed NAFLD treatment includes slow gradual weight loss, as well as optimization of risk factors including hyperlipidemia and blood glucose as needed. Given normal transaminase, do not feel that extensive hepatobiliary disease work-up is warranted at this time.      - Recommend slow gradual weight loss   - Maintain good control of cholesterol (No contraindication to starting a statin with LFT elevations)   - Optimize blood glucose as needed. Can consider use of GLP-1 inhibitor for both insulin resistance and help with weight loss.   - Improve nutrition:  emphasizing limit carbohydrates, especially simple carbohydrates.  - Increase physical activity: maintain physical activity more than 150 minutes a week  - Labs:  alpha-1-antitrypsin deficiency  - Limit alcohol use to <3 drinks a week and less than one a day.  - Follow up in six months     Jerri Mladonado PA-C   [facility] Hepatology     Total time for E/M services performed on the date of the encounter 45 minutes.  This included review of previous: clinic visits, hospital records, lab results, imaging studies, and procedural documentation. Time also includes patient visit, documentation and discussion with other providers.  The findings from this review are summarized in the above note.   -----------------------------------------------------       HPI:   Shaun Calvo Jr. is a 52 year old  "male  presenting for the evaluation of elevated LFT's.     LFT's were first noticed to be abnormal     Large spike being \"lot of pressure\" Pain is there all the time  Worse with bending, lifting and moving.     Working on losing weight and eating a bit healthier. Has been reducing portions. Usually 1.5 meals a day.     Regular bowel movements.     Hasn't been able to get under 300 lbs.     Patient denies jaundice, lower extremity edema, abdominal distension or confusion.  Patient also denies melena, hematochezia or hematemesis. Patient denies weight loss, fevers, sweats or chills.    6-8 hours of work does not a lot of work. Physical activity. No other outside walking.     PMH: Fibromyalgia, chronic low back pain, obsessive compulsive disorder, shoulder impingement, PTSH, anxiety, RLS, depression morbid obesity,     SMH: Reconstructive surgery jaw     Medications:   See below     Stopped smoking cigarettes in 2001. Typically drinks a few 2 ounce drink a night but then cut down the last few weeks. From age [age], he did not drink. Drank more regularly with his brother who recently passed away. No previous IV/IN drug use. Currently works at [employer]. No known family history of liver disease or liver cancer.     Previous work-up:  HCV antibody nonreactive  HBV SAb <11 - Not immune  HBV SAg nonreactive  HBV CAb nonreactive  KASI  F-actin  Iron panel:   Ferritin:   Iron Sats:   TTG - <0.6; 0.4   Gliadin IgA and IgG   Alpha-1-antripsin:   Ceruloplasmin   TSH   Cholesterol Total   HDL  LDL  Triglycerides - 216   Hemoglobin A1c     Medical hx Surgical hx   Past Medical History:   Diagnosis Date     Anxiety      Asthma      Atrial fibrillation (H)      Depression      Hypertension     No past surgical history on file.              Medications:     Current Outpatient Medications   Medication     albuterol (PROAIR HFA/PROVENTIL HFA/VENTOLIN HFA) 108 (90 Base) MCG/ACT inhaler     aspirin 81 MG EC tablet     B complex-vitamin " C-folic acid 400 mcg tablet     cyclobenzaprine (FLEXERIL) 10 MG tablet     fish oil-omega-3 fatty acids (FISH OIL) 300-1,000 mg capsule     hydrochlorothiazide (HYDRODIURIL) 25 MG tablet     ketamine HCl POWD     ketorolac (TORADOL) 30 MG/ML injection     lisinopril (ZESTRIL) 20 MG tablet     lisinopril (ZESTRIL) 40 MG tablet     medical cannabis (Patient's own supply)     naproxen (NAPROSYN) 500 MG tablet     omeprazole (PRILOSEC) 20 MG capsule     pregabalin (LYRICA) 100 MG capsule     Vitamin D3 (VITAMIN D3) 25 mcg (1000 units) tablet     No current facility-administered medications for this visit.            Allergies:     Allergies   Allergen Reactions     Penicillins Anaphylaxis            Social History:     Social History     Socioeconomic History     Marital status:      Spouse name: Not on file     Number of children: Not on file     Years of education: Not on file     Highest education level: Not on file   Occupational History     Not on file   Tobacco Use     Smoking status: Former Smoker     Smokeless tobacco: Never Used   Vaping Use     Vaping Use: Never used   Substance and Sexual Activity     Alcohol use: Not on file     Drug use: Not on file     Sexual activity: Yes   Other Topics Concern     Not on file   Social History Narrative    The patient is originally from [place], [place] and went to [employer]. He did not graduate. He has worked as a  for much of his life. 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 Af ter he was released from long term, he looked for work in [place], [place] but could not find any and subsequently returned to [place]. He is currently living at the [facility]. He  in 2004. He has a daughter, Mayra Valdez, who is 23 and  with whom he has no contact.       Social Determinants of Health     Financial Resource Strain: Not on file   Food Insecurity: Not on file   Transportation  Needs: Not on file   Physical Activity: Not on file   Stress: Not on file   Social Connections: Not on file   Intimate Partner Violence: Not on file   Housing Stability: Not on file            Family History:     Family History   Problem Relation Age of Onset     Heart Disease Father               Review of Systems:   Gen: See HPI     HEENT: No change in vision or hearing, mouth sores, dysphagia, lymph nodes  Resp: No shortness of breath, coughing, hx of asthma  CV: No chest pain, palpitations, syncope   GI: See HPI  : No dysuria, history of stones, urine color    Skin: No rash; no pruritus or psoriasis  MS: No arthralgias, myalgias, joint swelling  Neuro: No memory changes, confusion, numbness    Heme: No difficulty clotting, bruising, bleeding  Psych:  No anxiety, depression, agitation          Physical Exam:   BP (!) 170/106 (BP Location: Right arm, Patient Position: Sitting, Cuff Size: Adult Large)   Pulse 82   Temp 98.4  F (36.9  C) (Oral)   Wt (!) 153.1 kg (337 lb 8 oz)   SpO2 94%   BMI 45.77 kg/m      Gen: A&Ox3, NAD, well developed  HEENT: non-icteric  CV: RRR, no overt murmurs  Lung: CTA Bilatererally, no wheezing or crackles.   Lym- no palpable lymphadenopathy  Abd: soft, NT, ND, no palpable splenomegaly, liver is not palpable.   Ext: no edema, intact pulses.   Skin: No rash,  no palmar erythema, telangiectasias or jaundice  Neuro: grossly intact, no asterixis   Psych: appropriate mood and affects         Data:   Reviewed in person and significant for:    Lab Results   Component Value Date     05/25/2022      Lab Results   Component Value Date    POTASSIUM 4.5 05/25/2022     Lab Results   Component Value Date    CHLORIDE 105 05/25/2022     Lab Results   Component Value Date    CO2 24 05/25/2022     Lab Results   Component Value Date    BUN 15 05/25/2022     Lab Results   Component Value Date    CR 1.24 05/25/2022       Lab Results   Component Value Date    WBC 5.3 05/25/2022     Lab Results    Component Value Date    HGB 14.7 05/25/2022     Lab Results   Component Value Date    HCT 42.5 05/25/2022     Lab Results   Component Value Date    MCV 85 05/25/2022     Lab Results   Component Value Date     05/25/2022       Lab Results   Component Value Date    AST 23 06/07/2022     Lab Results   Component Value Date    ALT 24 06/07/2022     No results found for: BILICONJ   Lab Results   Component Value Date    BILITOTAL 0.4 06/07/2022       Lab Results   Component Value Date    ALBUMIN 3.9 06/07/2022     Lab Results   Component Value Date    PROTTOTAL 6.4 06/07/2022      Lab Results   Component Value Date    ALKPHOS 89 06/07/2022       No results found for: INR      Imaging:        EXAM: CTA CHEST ABDOMEN PELVIS W CONTRAST  LOCATION: [facility]  DATE/TIME: 1/1/2022 5:30 PM     INDICATION: Pain. Radiating pain.  COMPARISON: CTA chest 02/01/2021.  TECHNIQUE: CT angiogram chest abdomen pelvis during arterial phase of injection of IV contrast. 2D and 3D MIP reconstructions were performed by the CT technologist. Dose reduction techniques were used.   CONTRAST: Isovue 370    100ml     FINDINGS:   CT ANGIOGRAM CHEST, ABDOMEN, AND PELVIS: Sinuses of Valsalva measure 4.5 cm. The sinotubular junction measures 3.5 cm. The mid ascending aorta measures up to 3.5 cm. The descending thoracic aorta measures 3.2 cm. The abdominal aorta measures up to 2.3 cm   proximally. No dissection. Incidental retroaortic left renal vein.      No obvious pulmonary embolism.     LUNGS AND PLEURA: Lungs are clear. No pleural effusion or pneumothorax.     MEDIASTINUM/AXILLAE: No thoracic adenopathy. Normal heart size. No pericardial effusion.     CORONARY ARTERY CALCIFICATION: Compromised by motion.     HEPATOBILIARY: Hepatic steatosis. No opaque gallstones or biliary dilatation.     PANCREAS: Normal.     SPLEEN: Normal.     ADRENAL GLANDS: Normal.     KIDNEYS/BLADDER: Mild circumferential bladder wall  thickening. Otherwise, negative.     BOWEL: Decompressed stomach. Unremarkable bowel and appendix. No obstruction or inflammation.     LYMPH NODES: No abdominal or pelvic adenopathy.     PELVIC ORGANS: Unremarkable.     MUSCULOSKELETAL: Unremarkable.     OTHER: No free fluid or air.                                                                       IMPRESSION:     1.  Enlarged sinuses of Valsalva at 4.5 cm.     2.  Otherwise, nonaneurysmal aorta. No aortic dissection or wall thickening.     3.  No pulmonary embolism identified.     4.  Mild urinary bladder wall thickening; correlate for cystitis.     5.  Hepatic steatosis.     6.  No other obvious findings to explain symptoms.

## 2022-06-27 NOTE — NURSING NOTE
No chief complaint on file.    Blood pressure (!) 170/106, pulse 82, temperature 98.4  F (36.9  C), temperature source Oral, weight (!) 153.1 kg (337 lb 8 oz), SpO2 94 %.    Herman Ma on 6/27/2022 at 1:19 PM

## 2022-06-28 ENCOUNTER — TRANSFERRED RECORDS (OUTPATIENT)
Dept: HEALTH INFORMATION MANAGEMENT | Facility: CLINIC | Age: 52
End: 2022-06-28

## 2022-06-28 LAB — HBV SURFACE AB SERPL IA-ACNC: 10.95 M[IU]/ML

## 2022-07-05 LAB
A1AT PHENOTYP SERPL-IMP: NORMAL
A1AT SERPL-MCNC: 151 MG/DL
A1AT SS SERPL-MCNC: NEGATIVE G/L
A1AT SZ SERPL-MCNC: NORMAL G/L
A1AT ZZ SERPL-MCNC: NEGATIVE G/L
SPECIMEN SOURCE: NORMAL

## 2022-07-07 ENCOUNTER — APPOINTMENT (OUTPATIENT)
Dept: CT IMAGING | Facility: HOSPITAL | Age: 52
End: 2022-07-07
Attending: EMERGENCY MEDICINE
Payer: COMMERCIAL

## 2022-07-07 ENCOUNTER — HOSPITAL ENCOUNTER (EMERGENCY)
Facility: HOSPITAL | Age: 52
Discharge: HOME | End: 2022-07-07
Attending: EMERGENCY MEDICINE | Admitting: EMERGENCY MEDICINE
Payer: COMMERCIAL

## 2022-07-07 VITALS
OXYGEN SATURATION: 97 % | TEMPERATURE: 97.8 F | DIASTOLIC BLOOD PRESSURE: 112 MMHG | SYSTOLIC BLOOD PRESSURE: 173 MMHG | BODY MASS INDEX: 43.33 KG/M2 | RESPIRATION RATE: 20 BRPM | HEART RATE: 70 BPM | HEIGHT: 74 IN

## 2022-07-07 DIAGNOSIS — R53.83 FATIGUE, UNSPECIFIED TYPE: ICD-10-CM

## 2022-07-07 DIAGNOSIS — R10.13 EPIGASTRIC PAIN: ICD-10-CM

## 2022-07-07 LAB
ALBUMIN SERPL-MCNC: 4.2 G/DL (ref 3.5–5)
ALP SERPL-CCNC: 84 U/L (ref 45–120)
ALT SERPL W P-5'-P-CCNC: 18 U/L (ref 0–45)
ANION GAP SERPL CALCULATED.3IONS-SCNC: 6 MMOL/L (ref 5–18)
AST SERPL W P-5'-P-CCNC: 18 U/L (ref 0–40)
BASOPHILS # BLD AUTO: 0.1 10E3/UL (ref 0–0.2)
BASOPHILS NFR BLD AUTO: 1 %
BILIRUB DIRECT SERPL-MCNC: 0.2 MG/DL
BILIRUB SERPL-MCNC: 0.7 MG/DL (ref 0–1)
BUN SERPL-MCNC: 11 MG/DL (ref 8–22)
CALCIUM SERPL-MCNC: 9.5 MG/DL (ref 8.5–10.5)
CHLORIDE BLD-SCNC: 104 MMOL/L (ref 98–107)
CO2 SERPL-SCNC: 28 MMOL/L (ref 22–31)
CREAT SERPL-MCNC: 1.09 MG/DL (ref 0.7–1.3)
EOSINOPHIL # BLD AUTO: 0.1 10E3/UL (ref 0–0.7)
EOSINOPHIL NFR BLD AUTO: 2 %
ERYTHROCYTE [DISTWIDTH] IN BLOOD BY AUTOMATED COUNT: 12.1 % (ref 10–15)
FLUAV RNA SPEC QL NAA+PROBE: NEGATIVE
FLUBV RNA RESP QL NAA+PROBE: NEGATIVE
GFR SERPL CREATININE-BSD FRML MDRD: 81 ML/MIN/1.73M2
GLUCOSE BLD-MCNC: 114 MG/DL (ref 70–125)
HCT VFR BLD AUTO: 46 % (ref 40–53)
HGB BLD-MCNC: 15.5 G/DL (ref 13.3–17.7)
HOLD SPECIMEN: NORMAL
IMM GRANULOCYTES # BLD: 0 10E3/UL
IMM GRANULOCYTES NFR BLD: 1 %
LIPASE SERPL-CCNC: 21 U/L (ref 0–52)
LYMPHOCYTES # BLD AUTO: 1.4 10E3/UL (ref 0.8–5.3)
LYMPHOCYTES NFR BLD AUTO: 22 %
MCH RBC QN AUTO: 29.1 PG (ref 26.5–33)
MCHC RBC AUTO-ENTMCNC: 33.7 G/DL (ref 31.5–36.5)
MCV RBC AUTO: 86 FL (ref 78–100)
MONOCYTES # BLD AUTO: 0.5 10E3/UL (ref 0–1.3)
MONOCYTES NFR BLD AUTO: 7 %
NEUTROPHILS # BLD AUTO: 4.5 10E3/UL (ref 1.6–8.3)
NEUTROPHILS NFR BLD AUTO: 67 %
NRBC # BLD AUTO: 0 10E3/UL
NRBC BLD AUTO-RTO: 0 /100
PLATELET # BLD AUTO: 214 10E3/UL (ref 150–450)
POTASSIUM BLD-SCNC: 4 MMOL/L (ref 3.5–5)
PROT SERPL-MCNC: 7 G/DL (ref 6–8)
RBC # BLD AUTO: 5.33 10E6/UL (ref 4.4–5.9)
SARS-COV-2 RNA RESP QL NAA+PROBE: NEGATIVE
SODIUM SERPL-SCNC: 138 MMOL/L (ref 136–145)
TROPONIN I SERPL-MCNC: 0.02 NG/ML (ref 0–0.29)
WBC # BLD AUTO: 6.5 10E3/UL (ref 4–11)

## 2022-07-07 PROCEDURE — 84484 ASSAY OF TROPONIN QUANT: CPT | Performed by: EMERGENCY MEDICINE

## 2022-07-07 PROCEDURE — 36415 COLL VENOUS BLD VENIPUNCTURE: CPT | Performed by: EMERGENCY MEDICINE

## 2022-07-07 PROCEDURE — 250N000011 HC RX IP 250 OP 636: Performed by: EMERGENCY MEDICINE

## 2022-07-07 PROCEDURE — 87636 SARSCOV2 & INF A&B AMP PRB: CPT | Performed by: EMERGENCY MEDICINE

## 2022-07-07 PROCEDURE — 80053 COMPREHEN METABOLIC PANEL: CPT | Performed by: EMERGENCY MEDICINE

## 2022-07-07 PROCEDURE — 70450 CT HEAD/BRAIN W/O DYE: CPT

## 2022-07-07 PROCEDURE — 74177 CT ABD & PELVIS W/CONTRAST: CPT

## 2022-07-07 PROCEDURE — 93005 ELECTROCARDIOGRAM TRACING: CPT | Performed by: EMERGENCY MEDICINE

## 2022-07-07 PROCEDURE — 99285 EMERGENCY DEPT VISIT HI MDM: CPT | Mod: CS,25

## 2022-07-07 PROCEDURE — 82248 BILIRUBIN DIRECT: CPT | Performed by: EMERGENCY MEDICINE

## 2022-07-07 PROCEDURE — 83690 ASSAY OF LIPASE: CPT | Performed by: EMERGENCY MEDICINE

## 2022-07-07 PROCEDURE — 85004 AUTOMATED DIFF WBC COUNT: CPT | Performed by: EMERGENCY MEDICINE

## 2022-07-07 PROCEDURE — C9803 HOPD COVID-19 SPEC COLLECT: HCPCS

## 2022-07-07 RX ORDER — IOPAMIDOL 755 MG/ML
75 INJECTION, SOLUTION INTRAVASCULAR ONCE
Status: COMPLETED | OUTPATIENT
Start: 2022-07-07 | End: 2022-07-07

## 2022-07-07 RX ORDER — SUCRALFATE 1 G/1
1 TABLET ORAL 4 TIMES DAILY
Qty: 84 TABLET | Refills: 0 | Status: SHIPPED | OUTPATIENT
Start: 2022-07-07 | End: 2022-07-28

## 2022-07-07 RX ADMIN — IOPAMIDOL 75 ML: 755 INJECTION, SOLUTION INTRAVENOUS at 15:23

## 2022-07-07 ASSESSMENT — ENCOUNTER SYMPTOMS
DIZZINESS: 0
CHILLS: 0
FEVER: 0
SHORTNESS OF BREATH: 0
NAUSEA: 0
ABDOMINAL PAIN: 1
HEADACHES: 1
SORE THROAT: 0
APPETITE CHANGE: 0
DIARRHEA: 0
VOMITING: 0
JOINT SWELLING: 0
BLOOD IN STOOL: 0
FATIGUE: 1
HEMATURIA: 0
DYSURIA: 0
CONFUSION: 0

## 2022-07-07 NOTE — ED PROVIDER NOTES
Emergency Department Encounter     Evaluation Date & Time:   7/7/2022  4:18 PM    CHIEF COMPLAINT:  Hypertension, Fatigue, and Abdominal Pain      Triage Note:  Patient presents to ED with fatigue, chills, and sweating that started yesterday.  States has had these symptoms off and on for a few months.  Also right sided abdominal pain.      ED COURSE & MEDICAL DECISION MAKING:     ED Course as of 07/07/22 1717   Thu Jul 07, 2022   1635 Labs all unremarkable.  Covid/Flu negative.  CT abd/pelvis and CT head neg for any sort of acute pathology.       Pt presenting with ongoing symptoms for the past couple months, including intermittent epigastric pain, worse with eating, as well as generalized fatigue.  Pt reports having this every so often, uncertain why.  Workup here all unremarkable, reassuring. Pt on omeprazole.  Will add carafate and again refer to primary clinic.    4:19 PM I met with the patient, obtained history, performed an initial exam, and discussed options and plan for diagnostics and treatment here in the ED. PPE worn: N95, eye protection, gloves.    At the conclusion of the encounter I discussed the results of all the tests and the disposition. The questions were answered. The patient or family acknowledged understanding and was agreeable with the care plan.    MEDICATIONS GIVEN IN THE EMERGENCY DEPARTMENT:  Medications   iopamidol (ISOVUE-370) solution 75 mL (75 mLs Intravenous Given 7/7/22 1523)       NEW PRESCRIPTIONS STARTED AT TODAY'S ED VISIT:  Discharge Medication List as of 7/7/2022  4:48 PM      START taking these medications    Details   sucralfate (CARAFATE) 1 GM tablet Take 1 tablet (1 g) by mouth 4 times daily for 21 days, Disp-84 tablet, R-0, E-Prescribe           HPI   HPI  Shaunjoseph Calvo Jr. is a 52 year old male with a pertinent history of anxiety, obesity, asthma, HTN, hepatic steatosis who presents to this ED by walk in for evaluation of abdominal pain, headache, fatigue. Patient  "reports he has been dealing with fatty liver over the last few months. Has been seeing GI, states he was recommended weight loss and diet change. Over the last few days began having more severe upper and right sided abdominal pain that is worsened with eating. Denies any associated nausea, vomiting, diarrhea, black or bloody stools. No dysuria or hematuria. He denies any appetite change, but has not been eating as much as the pain is worsened with eating. He reports that he has been taking a daily omeprazole.    Patient also complains of a headache today, as well as severe fatigue and exhaustion. Wife states that this has been weekly over the last few months where patient \"crashes after work.\" He denies any other concerns.    REVIEW OF SYSTEMS:  Review of Systems   Constitutional: Positive for fatigue. Negative for appetite change, chills and fever.   HENT: Negative for sore throat.    Eyes: Negative for visual disturbance.   Respiratory: Negative for shortness of breath.    Cardiovascular: Negative for chest pain.   Gastrointestinal: Positive for abdominal pain. Negative for blood in stool, diarrhea, nausea and vomiting.   Endocrine: Negative for polyuria.   Genitourinary: Negative for dysuria and hematuria.        - urinary changes   Musculoskeletal: Negative for joint swelling.   Skin: Negative for rash.   Neurological: Positive for headaches. Negative for dizziness.   Psychiatric/Behavioral: Negative for confusion.   All other systems reviewed and are negative.      Medical History     Past Medical History:   Diagnosis Date     Anxiety      Asthma      Atrial fibrillation (H)      Depression      Hypertension        History reviewed. No pertinent surgical history.    Family History   Problem Relation Age of Onset     Heart Disease Father        Social History     Tobacco Use     Smoking status: Former Smoker     Smokeless tobacco: Never Used   Vaping Use     Vaping Use: Never used       sucralfate (CARAFATE) 1 " "GM tablet  albuterol (PROAIR HFA/PROVENTIL HFA/VENTOLIN HFA) 108 (90 Base) MCG/ACT inhaler  aspirin 81 MG EC tablet  B complex-vitamin C-folic acid 400 mcg tablet  cyclobenzaprine (FLEXERIL) 10 MG tablet  fish oil-omega-3 fatty acids (FISH OIL) 300-1,000 mg capsule  hydrochlorothiazide (HYDRODIURIL) 25 MG tablet  ketamine HCl POWD  ketorolac (TORADOL) 30 MG/ML injection  lisinopril (ZESTRIL) 20 MG tablet  lisinopril (ZESTRIL) 40 MG tablet  medical cannabis (Patient's own supply)  naproxen (NAPROSYN) 500 MG tablet  omeprazole (PRILOSEC) 20 MG capsule  pregabalin (LYRICA) 100 MG capsule  Vitamin D3 (VITAMIN D3) 25 mcg (1000 units) tablet      Physical Exam     Vitals:  BP (!) 173/112   Pulse 70   Temp 97.8  F (36.6  C) (Oral)   Resp 20   Ht 1.88 m (6' 2\")   SpO2 97%   BMI 43.33 kg/m      PHYSICAL EXAM:   Physical Exam  Vitals and nursing note reviewed.   Constitutional:       General: He is not in acute distress.     Appearance: Normal appearance. He is obese.   HENT:      Head: Normocephalic and atraumatic.      Nose: Nose normal.      Mouth/Throat:      Mouth: Mucous membranes are moist.   Eyes:      Pupils: Pupils are equal, round, and reactive to light.   Cardiovascular:      Rate and Rhythm: Normal rate and regular rhythm.      Pulses: Normal pulses.           Radial pulses are 2+ on the right side and 2+ on the left side.        Dorsalis pedis pulses are 2+ on the right side and 2+ on the left side.   Pulmonary:      Effort: Pulmonary effort is normal. No respiratory distress.      Breath sounds: Normal breath sounds.   Abdominal:      Palpations: Abdomen is soft.      Tenderness: There is no abdominal tenderness.   Musculoskeletal:      Cervical back: Full passive range of motion without pain and neck supple.      Comments: No calf tenderness or swelling b/l   Skin:     General: Skin is warm.      Findings: No rash.   Neurological:      General: No focal deficit present.      Mental Status: He is alert. " Mental status is at baseline.      Comments: Fluent speech, no acute lateralizing deficits   Psychiatric:         Mood and Affect: Mood normal.         Behavior: Behavior normal.       Results     LAB:  All pertinent labs reviewed and interpreted  Labs Ordered and Resulted from Time of ED Arrival to Time of ED Departure   INFLUENZA A/B & SARS-COV2 PCR MULTIPLEX - Normal       Result Value    Influenza A PCR Negative      Influenza B PCR Negative      SARS CoV2 PCR Negative     BASIC METABOLIC PANEL - Normal    Sodium 138      Potassium 4.0      Chloride 104      Carbon Dioxide (CO2) 28      Anion Gap 6      Urea Nitrogen 11      Creatinine 1.09      Calcium 9.5      Glucose 114      GFR Estimate 81     HEPATIC FUNCTION PANEL - Normal    Bilirubin Total 0.7      Bilirubin Direct 0.2      Protein Total 7.0      Albumin 4.2      Alkaline Phosphatase 84      AST 18      ALT 18     LIPASE - Normal    Lipase 21     TROPONIN I - Normal    Troponin I 0.02     CBC WITH PLATELETS AND DIFFERENTIAL    WBC Count 6.5      RBC Count 5.33      Hemoglobin 15.5      Hematocrit 46.0      MCV 86      MCH 29.1      MCHC 33.7      RDW 12.1      Platelet Count 214      % Neutrophils 67      % Lymphocytes 22      % Monocytes 7      % Eosinophils 2      % Basophils 1      % Immature Granulocytes 1      NRBCs per 100 WBC 0      Absolute Neutrophils 4.5      Absolute Lymphocytes 1.4      Absolute Monocytes 0.5      Absolute Eosinophils 0.1      Absolute Basophils 0.1      Absolute Immature Granulocytes 0.0      Absolute NRBCs 0.0         RADIOLOGY:  CT Abdomen Pelvis w Contrast   Final Result   IMPRESSION:    1.  No abnormalities are seen to explain symptoms. Specifically, no inflammatory changes or bowel obstruction.   2.  Incidental descending duodenal diverticulum and retroaortic left renal vein.   3.  Incidental perineural lower thoracic cysts, unchanged.      Head CT w/o contrast   Final Result   IMPRESSION:   1.  No acute intracranial  process.          ECG:  NSR, rate 60, normal intervals, no acute ischemia    I have independently reviewed and interpreted the EKG(s) documented above       FINAL IMPRESSION:    ICD-10-CM    1. Fatigue, unspecified type  R53.83    2. Epigastric pain  R10.13        0 minutes of critical care time      I, Lupillo Deleon, am serving as a scribe to document services personally performed by Dr. Fidel Barrera, based on my observations and the provider's statements to me. I, Fidel Barrera, DO attest that Lupillo Deleon is acting in a scribe capacity, has observed my performance of the services and has documented them in accordance with my direction.      Fidel Barrera DO  Emergency Medicine  [facility] EMERGENCY DEPARTMENT  7/7/2022  4:26 PM     Fidel Barrera MD  07/07/22 8010

## 2022-07-07 NOTE — DISCHARGE INSTRUCTIONS
Continue omeprazole daily and start taking carafate as directed. Follow up with primary clinic closely.

## 2022-07-07 NOTE — ED TRIAGE NOTES
Patient presents to ED with fatigue, chills, and sweating that started yesterday.  States has had these symptoms off and on for a few months.  Also right sided abdominal pain.     Triage Assessment     Row Name 07/07/22 1313       Triage Assessment (Adult)    Airway WDL WDL       Respiratory WDL    Respiratory WDL WDL       Skin Circulation/Temperature WDL    Skin Circulation/Temperature WDL WDL       Cardiac WDL    Cardiac WDL WDL       Peripheral/Neurovascular WDL    Peripheral Neurovascular WDL WDL       Cognitive/Neuro/Behavioral WDL    Cognitive/Neuro/Behavioral WDL WDL

## 2022-07-07 NOTE — ED NOTES
"    ED Provider In Triage Note  [facility]  Encounter Date: Jul 7, 2022    Chief Complaint   Patient presents with     Hypertension     Fatigue     Abdominal Pain         Use of Intrepreter: N/A    Brief HPI:   Shaun Calvo Jr. is a 52 year old male presenting to the Emergency Department with a chief complaint of high blood pressure.    He reports feeling exhausted, headache, sweaty. +abd pain. Abd pain on right side and worsens with eating but this has been like this for several months and has seen GI for it.    He gets these symptoms almost weekly but worsening. Has been told he has fatty liver in the past.    He has been taking his lisinoprili and hydrochlorothiazide.    Symptoms began yesterday and has slept a lot since.        Brief Physical Exam:  BP (!) 173/112   Pulse 70   Temp 97.8  F (36.6  C) (Oral)   Resp 20   Ht 1.88 m (6' 2\")   SpO2 97%   BMI 43.33 kg/m    General: Non-toxic appearing  HEENT: Atraumatic  Resp: No respiratory distress  Abdomen: +diffuse abd pain  Neuro: Alert, oriented, answers questions appropriately  Psych: Behavior appropriate  Musculoskeletal: atraumatic      Plan Initiated in Triage:  Orders Placed This Encounter     Head CT w/o contrast     CT Abdomen Pelvis w Contrast     Symptomatic; Unknown Influenza A/B & SARS-CoV2 (COVID-19) Virus PCR Multiplex     UA with Microscopic reflex to Culture     Outing Draw     Basic metabolic panel     Hepatic function panel     Lipase     Troponin I (now)       Will do labs and ct imaging to look for cause of his symptoms.    PIT Dispo:   Return to lobby while awaiting workup and ED bed availability          Lizzie Snowden MD on 7/7/2022 at 1:08 PM        Patient was evaluated by the Physician in Triage due to a limitation of available rooms in the Emergency Department. A plan of care was discussed based on the information obtained on the initial evaluation and patient was counseled to return back to " the Emergency Department lobby after this initial evalutaiton until results were obtained or a room became available in the Emergency Department. Patient was counseled not to leave prior to receiving the results of their workup.            Lizzie Snowden MD  07/07/22 4158

## 2022-07-07 NOTE — Clinical Note
Shaun Calvo was seen and treated in our emergency department on 7/7/2022.  He may return to work on 07/11/2022.       If you have any questions or concerns, please don't hesitate to call.      Fidel Barrera MD

## 2022-07-08 ENCOUNTER — PATIENT OUTREACH (OUTPATIENT)
Dept: CARE COORDINATION | Facility: CLINIC | Age: 52
End: 2022-07-08

## 2022-07-08 DIAGNOSIS — Z71.89 OTHER SPECIFIED COUNSELING: ICD-10-CM

## 2022-07-08 NOTE — PROGRESS NOTES
Clinic Care Coordination Contact  [facility]/Voicemail       Clinical Data: Care Coordinator Outreach  Outreach attempted x 1.  Left message on patient's voicemail with call back information and requested return call.   Care Coordinator will try to reach patient again in 1-2 business days.      Celina Thomas  [phone]  Care

## 2022-07-09 NOTE — PROGRESS NOTES
Clinic Care Coordination Contact  [facility]       Clinical Data: Care Coordinator Outreach  Outreach attempted x 2.  Left message on patient's voicemail with call back information and requested return call.  Plan: Care Coordinator will try to reach patient again in 1-2 business days.    SAMI Saab  [phone]  [facility]    Clinic Care Coordination Contact  [facility]       Clinical Data: Care Coordinator Outreach  Outreach attempted x 2.  Left message on patient's voicemail with call back information and requested return call.  Plan:. Care Coordinator will try to reach patient again in 1-2 business days.    SAMI Saab  [phone]  [facility]

## 2022-07-14 ENCOUNTER — TELEPHONE (OUTPATIENT)
Dept: FAMILY MEDICINE | Facility: CLINIC | Age: 52
End: 2022-07-14

## 2022-07-15 NOTE — TELEPHONE ENCOUNTER
Forms have been faxed and are still on my desk in case Pt comes to . I also made a copy for scanning

## 2022-07-18 DIAGNOSIS — G89.29 CHRONIC BILATERAL LOW BACK PAIN WITHOUT SCIATICA: ICD-10-CM

## 2022-07-18 DIAGNOSIS — M54.50 CHRONIC BILATERAL LOW BACK PAIN WITHOUT SCIATICA: ICD-10-CM

## 2022-07-18 RX ORDER — KETAMINE HCL 100 %
POWDER (GRAM) MISCELLANEOUS
Qty: 60 G | Refills: 2 | Status: SHIPPED | OUTPATIENT
Start: 2022-07-21 | End: 2022-08-22

## 2022-07-18 NOTE — TELEPHONE ENCOUNTER
Received call from patient requesting refill(s) of Ketamine     Last dispensed from pharmacy on 6/21/2022, 30 days and 7.20# per     Patient's last office/virtual visit by prescribing provider on 4/25/2022  Next office/virtual appointment scheduled for 8/22/2022    Last urine drug screen date 12/27/2021  Current opioid agreement on file (completed within the last year) Yes Date of opioid agreement: 12/27/2021    E-prescribe to [facility] pharmacy

## 2022-08-04 DIAGNOSIS — M51.26 LUMBAR DISC HERNIATION: ICD-10-CM

## 2022-08-04 DIAGNOSIS — M54.16 LUMBAR RADICULITIS: ICD-10-CM

## 2022-08-04 DIAGNOSIS — M47.816 SPONDYLOSIS OF LUMBAR REGION WITHOUT MYELOPATHY OR RADICULOPATHY: ICD-10-CM

## 2022-08-05 ENCOUNTER — MYC REFILL (OUTPATIENT)
Dept: FAMILY MEDICINE | Facility: CLINIC | Age: 52
End: 2022-08-05

## 2022-08-05 DIAGNOSIS — M51.26 LUMBAR DISC HERNIATION: ICD-10-CM

## 2022-08-05 DIAGNOSIS — M54.16 LUMBAR RADICULITIS: ICD-10-CM

## 2022-08-05 DIAGNOSIS — M47.816 SPONDYLOSIS OF LUMBAR REGION WITHOUT MYELOPATHY OR RADICULOPATHY: ICD-10-CM

## 2022-08-05 RX ORDER — CYCLOBENZAPRINE HCL 10 MG
TABLET ORAL
Qty: 60 TABLET | Refills: 0 | OUTPATIENT
Start: 2022-08-05

## 2022-08-05 RX ORDER — CYCLOBENZAPRINE HCL 10 MG
TABLET ORAL
Qty: 60 TABLET | Refills: 0 | Status: SHIPPED | OUTPATIENT
Start: 2022-08-05 | End: 2022-09-20

## 2022-08-12 ENCOUNTER — TRANSFERRED RECORDS (OUTPATIENT)
Dept: HEALTH INFORMATION MANAGEMENT | Facility: CLINIC | Age: 52
End: 2022-08-12

## 2022-08-22 ENCOUNTER — OFFICE VISIT (OUTPATIENT)
Dept: PALLIATIVE MEDICINE | Facility: OTHER | Age: 52
End: 2022-08-22
Payer: COMMERCIAL

## 2022-08-22 ENCOUNTER — TELEPHONE (OUTPATIENT)
Dept: PALLIATIVE MEDICINE | Facility: OTHER | Age: 52
End: 2022-08-22

## 2022-08-22 VITALS
SYSTOLIC BLOOD PRESSURE: 154 MMHG | DIASTOLIC BLOOD PRESSURE: 104 MMHG | WEIGHT: 315 LBS | HEART RATE: 63 BPM | HEIGHT: 74 IN | BODY MASS INDEX: 40.43 KG/M2

## 2022-08-22 DIAGNOSIS — G89.29 CHRONIC BILATERAL LOW BACK PAIN WITHOUT SCIATICA: ICD-10-CM

## 2022-08-22 DIAGNOSIS — M17.12 PRIMARY OSTEOARTHRITIS OF LEFT KNEE: ICD-10-CM

## 2022-08-22 DIAGNOSIS — M54.50 CHRONIC BILATERAL LOW BACK PAIN WITHOUT SCIATICA: ICD-10-CM

## 2022-08-22 PROCEDURE — G0463 HOSPITAL OUTPT CLINIC VISIT: HCPCS

## 2022-08-22 PROCEDURE — 99213 OFFICE O/P EST LOW 20 MIN: CPT | Performed by: ANESTHESIOLOGY

## 2022-08-22 RX ORDER — KETAMINE HCL 100 %
POWDER (GRAM) MISCELLANEOUS
Qty: 60 G | Refills: 2 | Status: SHIPPED | OUTPATIENT
Start: 2022-08-22 | End: 2022-10-17

## 2022-08-22 RX ORDER — KETOROLAC TROMETHAMINE 30 MG/ML
30 INJECTION, SOLUTION INTRAMUSCULAR; INTRAVENOUS EVERY 6 HOURS PRN
Qty: 8 ML | Refills: 3 | Status: SHIPPED | OUTPATIENT
Start: 2022-08-22 | End: 2022-10-11

## 2022-08-22 ASSESSMENT — PAIN SCALES - GENERAL: PAINLEVEL: MODERATE PAIN (5)

## 2022-08-22 NOTE — PROGRESS NOTES
[facility] - Office Visit    ASSESSMENT & PLAN     Shaun was seen today for pain.    Diagnoses and all orders for this visit:    Primary osteoarthritis of left knee  -     ketorolac (TORADOL) 30 MG/ML injection; Inject 1 mL (30 mg) into the muscle every 6 hours as needed for moderate pain No more than two doses per week    Chronic bilateral low back pain without sciatica  -     ketamine HCl POWD; Ketamine 120 mg one-half under tongue 4 times a day        Patient Instructions   PLAN:    Checkout may schedule for radiofrequency ablation for your back.    Continue the ketamine 120 mg 1/2 lozenge under tongue 4 times a day.    Continue the Toradol injections no more than 2 injections/week.    Continue the medical cannabis program.    Follow-up with Dr. Harkins in 3 months        -----  CHITO HARKINS MD  [facility]       SUBJECTIVE      Shaun Calvo Jr. is a 52 year old year old male who presents to clinic today for the following:     Follow-up for osteoarthritis of the knee, lumbar facet arethopathy.    Ports in general doing well.  He is with him his dog who is is registered emotional support animal.  Indicates that the dog can extend the pod to help him go from sitting to standing.  Has another dog at home that is larger does it as well.    Reports feeling this regimen is doing the best he has done in terms of helping with pain allow function.  Continues with the Toradol injections no more than 2 a week, ketamine 120 mg lozenges 1/2 lozenge 4 times a day, and the medical cannabis program.    He continues working 4-hour shifts 5 days a week.  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    Continues with the stressors living in his mother-in-law's house.  Given that there is no bathroom on the main level where his bedroom is, rather upper downstairs he does not want to pursue knee replacement until he is in better housing.  Until  "then notes this regimen [place] function.    He was to get a radiofrequency ablation in June, needs to reschedule.    Reviewing the record had been in the emergency room in [facility], notes he had abdominal pain which seem to resolve.  Working with his primary care provider had not been able to clarify what he is due to.  He does not attributed the Toradol.  States his alcohol use now is \"almost 0\"    Last urine drug test in December      Current Outpatient Medications:      albuterol (PROAIR HFA/PROVENTIL HFA/VENTOLIN HFA) 108 (90 Base) MCG/ACT inhaler, INHALE 2 PUFFS BY MOUTH EVERY 4 HOURS AS NEEDED, Disp: 8.5 g, Rfl: 5     aspirin 81 MG EC tablet, Take 81 mg by mouth daily, Disp: , Rfl:      cyclobenzaprine (FLEXERIL) 10 MG tablet, TAKE 1 TABLET(10 MG) BY MOUTH THREE TIMES DAILY AS NEEDED FOR MUSCLE SPASMS, Disp: 60 tablet, Rfl: 0     fish oil-omega-3 fatty acids (FISH OIL) 300-1,000 mg capsule, Take 2 g by mouth daily OTC, Disp: , Rfl:      hydrochlorothiazide (HYDRODIURIL) 25 MG tablet, TAKE 1 TABLET(25 MG) BY MOUTH DAILY, Disp: 90 tablet, Rfl: 3     ketamine HCl POWD, Ketamine 120 mg one-half under tongue 4 times a day, Disp: 60 g, Rfl: 2     ketorolac (TORADOL) 30 MG/ML injection, Inject 1 mL (30 mg) into the muscle every 6 hours as needed for moderate pain No more than two doses per week, Disp: 8 mL, Rfl: 3     lisinopril (ZESTRIL) 20 MG tablet, Take 1 tablet (20 mg) by mouth daily, Disp: 90 tablet, Rfl: 3     lisinopril (ZESTRIL) 40 MG tablet, Take 1 tablet (40 mg) by mouth daily, Disp: 90 tablet, Rfl: 1     medical cannabis (Patient's own supply), See Admin Instructions (The purpose of this order is to document that the patient reports taking medical cannabis.  This is not a prescription, and is not used to certify that the patient has a qualifying medical condition.) Vaping Oil andFlower leaf; 3 puffs inhaled every 3 hours as needed, Disp: , Rfl:      naproxen (NAPROSYN) 500 MG tablet, TAKE 1 TABLET(500 MG) BY " "MOUTH TWICE DAILY WITH MEALS AS NEEDED FOR PAIN, Disp: 28 tablet, Rfl: 1     omeprazole (PRILOSEC) 20 MG capsule, Take 20 mg by mouth every morning OTC, Disp: , Rfl:      B complex-vitamin C-folic acid 400 mcg tablet, [B COMPLEX-VITAMIN C-FOLIC ACID 400 MCG TABLET] Take 1 tablet by mouth daily., Disp: 30 tablet, Rfl: 5     pregabalin (LYRICA) 100 MG capsule, Take 1 capsule (100 mg) by mouth daily, Disp: 30 capsule, Rfl: 0     Vitamin D3 (VITAMIN D3) 25 mcg (1000 units) tablet, Take 2 tablets (50 mcg) by mouth 2 times daily, Disp: 120 tablet, Rfl: 0          Review of Systems   General, psych, musculoskeletal, bowels and bladder otherwise normal other than above.          OBJECTIVE   BP (!) 154/104   Pulse 63   Ht 1.88 m (6' 2\")   Wt (!) 150.4 kg (331 lb 9.6 oz)   BMI 42.57 kg/m           Physical Exam  General: Sits with left leg extended, has a wrap for the knee  Cardiovascular: Normal rate  Lungs: Pulmonary effort is normal, speaking in full sentences  MSK: n No concerning rashes or lesions.  Neurologic: No focal deficit, alert and oriented x3  Psychiatric\":  Bright affect, clear sensorium      Assessment, osteoarthritis of the knees, planning on a total knee replacement when he is in a living situation that well be appropriate for rehabilitation.    Lumbar facet changes, he will schedule of radiofrequency ablation.    Continue with the ketamine Toradol medical cannabis as above.    Total time more than 20 minutes    "

## 2022-08-22 NOTE — NURSING NOTE
PEG Score 12/27/2021 4/25/2022 8/22/2022   PEG Total Score 7.33 5.67 7.33   Questions: none    Refills:none    ROMAN [facility]ts [facility]   [facility] Number:  [phone]    Call with any questions about your care and for scheduling assistance.     Calls are returned Monday through Friday between 8 AM and 4:30 PM. We usually get back to you within 2 business days depending on the issue/request.    If we are prescribing your medications:    For opioid medication refills, call the clinic or send a [email or web address] message 7 days in advance.  Please include:    Name of requested medication    Name of the pharmacy.    For non-opioid medications, call your pharmacy directly to request a refill. Please allow 3-4 days to be processed.     Per [place] State Law:    All controlled substance prescriptions must be filled within 30 days of being written.      For those controlled substances allowing refills, pickup must occur within 30 days of last fill.      We believe regular attendance is key to your success in our program!      Any time you are unable to keep your appointment we ask that you call us at least 24 hours in advance to cancel.This will allow us to offer the appointment time to another patient.     Multiple missed appointments may lead to dismissal from the clinic.

## 2022-08-22 NOTE — PROGRESS NOTES
08/22/22 1111   PEG: A Thee-Item Scale Assessing Pain Intensity and Interference        0 = No pain / No interference    10 = Pain as bad as you can imagine / Completely interferes   What number best describes your pain on average in the past week? 7   What number best describes how, during the past week, pain has interfered with your enjoyment of life? 7   What number best describes how, during the past week, pain has interfered with your general activity? 8   PEG Total Score 7.33

## 2022-08-22 NOTE — PATIENT INSTRUCTIONS
PLAN:    Checkout may schedule for radiofrequency ablation for your back.    Continue the ketamine 120 mg 1/2 lozenge under tongue 4 times a day.    Continue the Toradol injections no more than 2 injections/week.    Continue the medical cannabis program.    Follow-up with Dr. Nagel in 3 months

## 2022-08-22 NOTE — TELEPHONE ENCOUNTER
PA Initiation    Medication: Ketorolac 30 mg/mL injection  Insurance Company: [employer] - Phone [phone] Fax [phone]  Pharmacy Filling the Rx: [id number] - [place], [place] - [address] AT [facility] [address]  Filling Pharmacy Phone: [phone]  Filling Pharmacy Fax: [phone]  Start Date: 8/22/2022

## 2022-08-22 NOTE — TELEPHONE ENCOUNTER
PA Initiation 8/22/2022    Medication: Ketorolac 30 mg/mL injection  Insurance Company:  "[employer]   Pharmacy Filling the Rx:  [facility]   Filling Pharmacy Phone:  [phone]  Filling Pharmacy Fax:  [phone]  Start Date:  8/22/2022

## 2022-08-23 NOTE — TELEPHONE ENCOUNTER
PRIOR AUTHORIZATION DENIED    Medication: Ketorolac 30 mg/mL injection    Denial Date: 8/22/2022    Denial Rationale: Medication is only covered for migraine headaches.        Appeal Information: If provider would like to appeal this decision we will need a detailed letter of medical necessity to start the process. Then re-route this request back to the PA pool.

## 2022-08-23 NOTE — TELEPHONE ENCOUNTER
Rationale:  Reports feeling this regimen is doing the best he has done in terms of helping with pain allow function.  Continues with the Toradol injections no more than 2 a week    Assessment, osteoarthritis of the knees, planning on a total knee replacement when he is in a living situation that well be appropriate for rehabilitation.(mnimal less invasive medication such as the toradol have been helpful in putting this off)     Lumbar facet changes, he will schedule of radiofrequency ablation.     Continue with the ketamine Toradol medical cannabis as above.

## 2022-08-25 NOTE — TELEPHONE ENCOUNTER
[facility] Call Center    Phone Message    May a detailed message be left on voicemail: yes     Reason for Call: Other: Patient is returning a call regarding his injection denial. Please reach out to patient ot discuss. thank you      Action Taken: Message routed to:  Other: [facility]     Travel Screening: Not Applicable

## 2022-08-25 NOTE — TELEPHONE ENCOUNTER
I left him a message to discuss if has migraines, only way they will fill ketoralc, can you ask him about? -Dr Erickson    Called and left  msg asking pt to call back to discuss if he has migraine headaches. (Pt had stated in an earlier call that it's ok to leave a detailed voicemail message).

## 2022-08-25 NOTE — TELEPHONE ENCOUNTER
Returned call to pt and he reports 6 mo of 100% pain relief post-Lumbar Rf, then 80% relief until 2 weeks ago. Pain is now back to baseline. This information was sent to the PA team. States he has spent the past few days in bed due to his back pain. Pt asks if anything more can be done to try to get approval for Ketorolac injections, such as an appeal, since insurance has allowed these injections in the past.

## 2022-09-02 NOTE — TELEPHONE ENCOUNTER
Left  msg for pt asking him to call back to to discuss if he was using the Ketorolac for headaches.

## 2022-09-12 ENCOUNTER — TELEPHONE (OUTPATIENT)
Dept: PALLIATIVE MEDICINE | Facility: OTHER | Age: 52
End: 2022-09-12

## 2022-09-12 NOTE — TELEPHONE ENCOUNTER
Screening Questions for Radiology Injections:RFA lumbar    Injection to be done at which interventional clinic site?  [facility]      Procedure ordered by Dr. Nagel     Procedure ordered? N/A    Transforaminal Cervical LIZA - Send to [facility] ([facility]) - No [facility] providers perform this procedure    What insurance would patient like us to bill for this procedure? Lumbar RFA is approved. OK to proceed with procedur.      Worker's comp or MVA (motor vehicle accident) -Any injection DO NOT SCHEDULE and route to Agusto Tapia.      [employer] insurance - For SI joint injections, DO NOT SCHEDULE and route to Ginette Castellano.       ALL BCBS, [employer] and [employer] - DO NOT SCHEDULE and route to Ginette Castellano    Is an  needed? No     Patient has a  home? (Review Grid) YES: pt aware    Any chance of pregnancy? Not Applicable   If YES, do NOT schedule and route to RN pool    Is patient actively being treated for cancer or immunocompromised? No  If YES, do NOT schedule and route to RN pool     Does the patient have a bleeding or clotting disorder? No     If YES, okay to schedule AND route to RN nurse pool. (For any patients with platelet count <100, RN must forward to provider)    Is patient taking any Blood Thinners OR Antiplatelet medication?  No   If hold needed, do NOT schedule, route to RN pool    Examples:   o Blood Thinners: (Coumadin, Warfarin, Jantoven, Pradaxa, Xarelto, Eliquis, Edoxaban, Enoxaparin, Lovenox, Heparin, Arixtra, Fondaparinux or Fragmin)  o Antiplatelet Medications: (Plavix, Brilinta or Effient)     Is patient taking any aspirin products (includes Excedrin and Fiorinal)? No     If more than 325mg/day, OK to schedule; Instruct Pt to decrease to less than 325 mg for 7 days AND route to RN pool    For CERVICAL procedures, hold all aspirin products for 6 days.     Tell Pt that if aspirin product is not held for 6 days, the procedure WILL BE cancelled.     Any allergies to contrast dye, iodine,  shellfish, or numbing and steroid medications? No    If YES, schedule and add allergy information to appointment notes AND route to the RN pool     If LIZA and Contrast Dye / Iodine Allergy? DO NOT SCHEDULE, route to RN pool    Allergies: Penicillins     Does patient have an active infection or treated for one within the past week? No    Is patient currently taking any antibiotics or steroid medications?  No     For patients on chronic, preventative, or prophylactic antibiotics, procedures may be scheduled.     For patients on antibiotics for active or recent infection, schedule 4 days after completed.    For patients on steroid medications, schedule 4 days after completed.     Has the patient had a flu shot or any other vaccinations within the past 7 days? No  If yes, explain that for the vaccine to work best they need to:       wait 1 week before and 1 week after getting any Vaccine    wait 1 week before and 2 weeks after getting Covid Vaccine #2 or BOOSTER    If patient has concerns about the timing, send to RN pool     Does patient have an MRI/CT?  YES:    Include Date and Check Procedure Scheduling Grid to see if required.    Was the MRI/CT done within the last 3  [facility] Lumbar  years?  NA yes    If no route to RN Pool    If yes, where was the MRI/CT done? [facility]      Refer to PACS Transmissions list for approved external locations and route to RN Pool High Priority    If MRI was not done at approved external location do NOT schedule and route to RN pool.      If patient has an imaging disc, the injection MAY be scheduled but patient must bring disc to appt or appt will be cancelled.    Procedure Specific Instructions:    If celiac plexus block, informed patient NPO for 6 hours and that it is okay to take medications with sips of water, especially blood pressure medications NO         If this is for a cervical procedure, informed patient that aspirin needs to be held for 6 days.   NO      Sedation, If  Sedation is ordered for any procedure, patient must be NPO for 6 hours prior to procedure NO      If IV needed:    Do not schedule procedures requiring IV placement in the first appointment of the day or first appointment after lunch. Do NOT schedule at 0745, 0815 or 1245. n/a    Instructed patient to arrive 30 minutes early for IV start if required. (Check Procedure Scheduling Grid)  no  Reminders:    If you are started on any steroids or antibiotics between now and your appointment, you must contact us because the procedure may need to be cancelled.  No      As a reminder, receiving steroids can decrease your body's ability to fight infection.   Would you still like to move forward with scheduling the injection?  No      IV Sedation is not provided for procedures. If oral anti-anxiety medication is needed, the patient should request this from their referring provider.      Instruct patient to arrive as directed prior to the scheduled appointment time:  [place]: 30 minutes before; if IV needed 1 hour before       For patients 85 or older we recommend having an adult stay w/ them for the remainder of the day.       If the patient is Diabetic, remind them to bring their glucometer.      Does the patient have any questions?  NO  Negin Nobles  [facility]

## 2022-09-16 ENCOUNTER — TELEPHONE (OUTPATIENT)
Dept: FAMILY MEDICINE | Facility: CLINIC | Age: 52
End: 2022-09-16

## 2022-09-16 NOTE — TELEPHONE ENCOUNTER
Spoke to patient who states he has been having ongoing fatigue and pain in his side. He requests appt with dr. Vo. Appt made 9/30.

## 2022-09-16 NOTE — TELEPHONE ENCOUNTER
Reason for Call:  Other appointment and call back    Detailed comments: pt wants a call back we tried scheduling an apt / pt has ongoing issue he would like to talk with care team about what to do next   Bharti Avilez is whom he has talked to in the past   Your missing work today for it and the last couple days    Phone Number Patient can be reached at: Cell number on file:    Telephone Information:   Mobile [phone]       Best Time: anytime    Can we leave a detailed message on this number? YES    Call taken on 9/16/2022 at 7:21 AM by Shemar Velasquez

## 2022-09-18 DIAGNOSIS — M51.26 LUMBAR DISC HERNIATION: ICD-10-CM

## 2022-09-18 DIAGNOSIS — M54.16 LUMBAR RADICULITIS: ICD-10-CM

## 2022-09-18 DIAGNOSIS — M47.816 SPONDYLOSIS OF LUMBAR REGION WITHOUT MYELOPATHY OR RADICULOPATHY: ICD-10-CM

## 2022-09-19 ENCOUNTER — MYC REFILL (OUTPATIENT)
Dept: FAMILY MEDICINE | Facility: CLINIC | Age: 52
End: 2022-09-19

## 2022-09-19 DIAGNOSIS — S29.011D INTERCOSTAL MUSCLE STRAIN, SUBSEQUENT ENCOUNTER: ICD-10-CM

## 2022-09-19 DIAGNOSIS — J45.20 MILD INTERMITTENT ASTHMA WITHOUT COMPLICATION: ICD-10-CM

## 2022-09-19 DIAGNOSIS — M51.26 LUMBAR DISC HERNIATION: ICD-10-CM

## 2022-09-19 DIAGNOSIS — M54.16 LUMBAR RADICULITIS: ICD-10-CM

## 2022-09-19 DIAGNOSIS — M47.816 SPONDYLOSIS OF LUMBAR REGION WITHOUT MYELOPATHY OR RADICULOPATHY: ICD-10-CM

## 2022-09-20 RX ORDER — CYCLOBENZAPRINE HCL 10 MG
TABLET ORAL
Qty: 60 TABLET | Refills: 0 | Status: SHIPPED | OUTPATIENT
Start: 2022-09-20 | End: 2022-09-30

## 2022-09-20 RX ORDER — CYCLOBENZAPRINE HCL 10 MG
10 TABLET ORAL 3 TIMES DAILY PRN
Qty: 60 TABLET | Refills: 0 | Status: SHIPPED | OUTPATIENT
Start: 2022-09-20 | End: 2022-11-13

## 2022-09-20 NOTE — TELEPHONE ENCOUNTER
Routing refill request to provider for review/approval because:  Drug not on the [facility] refill protocol     Last Written Prescription Date:  8/5/22  Last Fill Quantity: 60,  # refills: 0   Last office visit provider:  6/7/22     Requested Prescriptions   Pending Prescriptions Disp Refills     cyclobenzaprine (FLEXERIL) 10 MG tablet [Pharmacy Med Name: CYCLOBENZAPRINE 10MG TABLETS] 60 tablet 0     Sig: TAKE 1 TABLET(10 MG) BY MOUTH THREE TIMES DAILY AS NEEDED FOR MUSCLE SPASMS       There is no refill protocol information for this order          Delmer Berger RN 09/20/22 11:01 AM

## 2022-09-20 NOTE — TELEPHONE ENCOUNTER
Patient wants refill sent to another pharmacy per mychart note.  Shanti Melendez RN on 9/20/2022 at 2:49 PM

## 2022-09-21 RX ORDER — CYCLOBENZAPRINE HCL 10 MG
TABLET ORAL
Qty: 60 TABLET | Refills: 0 | OUTPATIENT
Start: 2022-09-21

## 2022-09-21 RX ORDER — NAPROXEN 500 MG/1
TABLET ORAL
Qty: 28 TABLET | Refills: 1 | Status: SHIPPED | OUTPATIENT
Start: 2022-09-21

## 2022-09-21 RX ORDER — ALBUTEROL SULFATE 90 UG/1
AEROSOL, METERED RESPIRATORY (INHALATION)
Qty: 8.5 G | Refills: 0 | Status: SHIPPED | OUTPATIENT
Start: 2022-09-21 | End: 2022-11-26

## 2022-09-21 NOTE — TELEPHONE ENCOUNTER
"Routing refill request to provider for review/approval because:  BP not in range.  ACT score out of date/not on file.    Last Written Prescription Date:  5/20/22  Last Fill Quantity: 28,  # refills: 1   Last office visit provider:  6/7/22     Last Written Prescription Date:  1/274/22  Last Fill Quantity: 8.5 g,  # refills: 5   Last office visit provider:  6/7/22    Requested Prescriptions   Pending Prescriptions Disp Refills     naproxen (NAPROSYN) 500 MG tablet [Pharmacy Med Name: NAPROXEN 500MG TABLETS] 28 tablet 1     Sig: TAKE 1 TABLET(500 MG) BY MOUTH TWICE DAILY WITH MEALS AS NEEDED FOR PAIN       NSAID Medications Failed - 9/21/2022 11:08 AM        Failed - Blood pressure under 140/90 in past 12 months     BP Readings from Last 3 Encounters:   08/22/22 (!) 154/104   07/07/22 (!) 173/112   06/27/22 (!) 170/106                 Passed - Normal ALT on file in past 12 months     Recent Labs   Lab Test 07/07/22  1359   ALT 18             Passed - Normal AST on file in past 12 months     Recent Labs   Lab Test 07/07/22  1359   AST 18             Passed - Recent (12 mo) or future (30 days) visit within the authorizing provider's specialty     Patient has had an office visit with the authorizing provider or a provider within the authorizing providers department within the previous 12 mos or has a future within next 30 days. See \"Patient Info\" tab in inbasket, or \"Choose Columns\" in Meds & Orders section of the refill encounter.              Passed - Patient is age 6-64 years        Passed - Normal CBC on file in past 12 months     Recent Labs   Lab Test 07/07/22  1359   WBC 6.5   RBC 5.33   HGB 15.5   HCT 46.0                    Passed - Medication is active on med list        Passed - Normal serum creatinine on file in past 12 months     Recent Labs   Lab Test 07/07/22  1359   CR 1.09       Ok to refill medication if creatinine is low             PROAIR  (90 Base) MCG/ACT inhaler [Pharmacy Med Name: " "PROAIR HFA ORAL INH (200  PFS) 8.5G] 8.5 g 5     Sig: INHALE 2 PUFFS BY MOUTH EVERY 4 HOURS AS NEEDED       Asthma Maintenance Inhalers - Anticholinergics Failed - 9/21/2022 11:08 AM        Failed - Asthma control assessment score within normal limits in last 6 months     Please review ACT score.           Passed - Patient is age 12 years or older        Passed - Medication is active on med list        Passed - Recent (6 mo) or future (30 days) visit within the authorizing provider's specialty     Patient had office visit in the last 6 months or has a visit in the next 30 days with authorizing provider or within the authorizing provider's specialty.  See \"Patient Info\" tab in inbasket, or \"Choose Columns\" in Meds & Orders section of the refill encounter.           Short-Acting Beta Agonist Inhalers Protocol  Failed - 9/21/2022 11:08 AM        Failed - Asthma control assessment score within normal limits in last 6 months     Please review ACT score.           Passed - Patient is age 12 or older        Passed - Medication is active on med list        Passed - Recent (6 mo) or future (30 days) visit within the authorizing provider's specialty     Patient had office visit in the last 6 months or has a visit in the next 30 days with authorizing provider or within the authorizing provider's specialty.  See \"Patient Info\" tab in inbasket, or \"Choose Columns\" in Meds & Orders section of the refill encounter.             Refused Prescriptions Disp Refills     cyclobenzaprine (FLEXERIL) 10 MG tablet [Pharmacy Med Name: CYCLOBENZAPRINE 10MG TABLETS] 60 tablet 0     Sig: TAKE 1 TABLET(10 MG) BY MOUTH THREE TIMES DAILY AS NEEDED FOR MUSCLE SPASMS       There is no refill protocol information for this order          Delmer Berger RN 09/21/22 11:10 AM  "

## 2022-09-30 ENCOUNTER — OFFICE VISIT (OUTPATIENT)
Dept: FAMILY MEDICINE | Facility: CLINIC | Age: 52
End: 2022-09-30
Payer: COMMERCIAL

## 2022-09-30 VITALS
DIASTOLIC BLOOD PRESSURE: 109 MMHG | SYSTOLIC BLOOD PRESSURE: 159 MMHG | WEIGHT: 315 LBS | OXYGEN SATURATION: 100 % | TEMPERATURE: 96 F | BODY MASS INDEX: 42.05 KG/M2 | HEART RATE: 70 BPM

## 2022-09-30 DIAGNOSIS — F33.2 SEVERE EPISODE OF RECURRENT MAJOR DEPRESSIVE DISORDER, WITHOUT PSYCHOTIC FEATURES (H): ICD-10-CM

## 2022-09-30 DIAGNOSIS — G89.29 CHRONIC BILATERAL LOW BACK PAIN WITHOUT SCIATICA: Primary | ICD-10-CM

## 2022-09-30 DIAGNOSIS — M54.50 CHRONIC BILATERAL LOW BACK PAIN WITHOUT SCIATICA: Primary | ICD-10-CM

## 2022-09-30 DIAGNOSIS — G89.29 OTHER CHRONIC PAIN: ICD-10-CM

## 2022-09-30 DIAGNOSIS — R07.89 RIGHT-SIDED CHEST WALL PAIN: ICD-10-CM

## 2022-09-30 DIAGNOSIS — M79.7 FIBROMYALGIA: ICD-10-CM

## 2022-09-30 PROCEDURE — 99214 OFFICE O/P EST MOD 30 MIN: CPT | Performed by: FAMILY MEDICINE

## 2022-09-30 ASSESSMENT — ANXIETY QUESTIONNAIRES
4. TROUBLE RELAXING: NEARLY EVERY DAY
2. NOT BEING ABLE TO STOP OR CONTROL WORRYING: NEARLY EVERY DAY
GAD7 TOTAL SCORE: 21
7. FEELING AFRAID AS IF SOMETHING AWFUL MIGHT HAPPEN: NEARLY EVERY DAY
7. FEELING AFRAID AS IF SOMETHING AWFUL MIGHT HAPPEN: NEARLY EVERY DAY
GAD7 TOTAL SCORE: 21
GAD7 TOTAL SCORE: 21
6. BECOMING EASILY ANNOYED OR IRRITABLE: NEARLY EVERY DAY
8. IF YOU CHECKED OFF ANY PROBLEMS, HOW DIFFICULT HAVE THESE MADE IT FOR YOU TO DO YOUR WORK, TAKE CARE OF THINGS AT HOME, OR GET ALONG WITH OTHER PEOPLE?: EXTREMELY DIFFICULT
IF YOU CHECKED OFF ANY PROBLEMS ON THIS QUESTIONNAIRE, HOW DIFFICULT HAVE THESE PROBLEMS MADE IT FOR YOU TO DO YOUR WORK, TAKE CARE OF THINGS AT HOME, OR GET ALONG WITH OTHER PEOPLE: EXTREMELY DIFFICULT
5. BEING SO RESTLESS THAT IT IS HARD TO SIT STILL: NEARLY EVERY DAY
3. WORRYING TOO MUCH ABOUT DIFFERENT THINGS: NEARLY EVERY DAY
1. FEELING NERVOUS, ANXIOUS, OR ON EDGE: NEARLY EVERY DAY

## 2022-09-30 ASSESSMENT — ASTHMA QUESTIONNAIRES
QUESTION_2 LAST FOUR WEEKS HOW OFTEN HAVE YOU HAD SHORTNESS OF BREATH: THREE TO SIX TIMES A WEEK
QUESTION_4 LAST FOUR WEEKS HOW OFTEN HAVE YOU USED YOUR RESCUE INHALER OR NEBULIZER MEDICATION (SUCH AS ALBUTEROL): ONE OR TWO TIMES PER DAY
QUESTION_1 LAST FOUR WEEKS HOW MUCH OF THE TIME DID YOUR ASTHMA KEEP YOU FROM GETTING AS MUCH DONE AT WORK, SCHOOL OR AT HOME: NONE OF THE TIME
QUESTION_3 LAST FOUR WEEKS HOW OFTEN DID YOUR ASTHMA SYMPTOMS (WHEEZING, COUGHING, SHORTNESS OF BREATH, CHEST TIGHTNESS OR PAIN) WAKE YOU UP AT NIGHT OR EARLIER THAN USUAL IN THE MORNING: NOT AT ALL
ACT_TOTALSCORE: 19
ACT_TOTALSCORE: 19
QUESTION_5 LAST FOUR WEEKS HOW WOULD YOU RATE YOUR ASTHMA CONTROL: WELL CONTROLLED

## 2022-09-30 ASSESSMENT — PATIENT HEALTH QUESTIONNAIRE - PHQ9
SUM OF ALL RESPONSES TO PHQ QUESTIONS 1-9: 19
SUM OF ALL RESPONSES TO PHQ QUESTIONS 1-9: 19
10. IF YOU CHECKED OFF ANY PROBLEMS, HOW DIFFICULT HAVE THESE PROBLEMS MADE IT FOR YOU TO DO YOUR WORK, TAKE CARE OF THINGS AT HOME, OR GET ALONG WITH OTHER PEOPLE: EXTREMELY DIFFICULT

## 2022-09-30 ASSESSMENT — PAIN SCALES - GENERAL: PAINLEVEL: WORST PAIN (10)
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
## 2022-10-01 ENCOUNTER — HEALTH MAINTENANCE LETTER (OUTPATIENT)
Age: 52
End: 2022-10-01

## 2022-10-11 DIAGNOSIS — M17.12 PRIMARY OSTEOARTHRITIS OF LEFT KNEE: ICD-10-CM

## 2022-10-11 RX ORDER — KETOROLAC TROMETHAMINE 30 MG/ML
30 INJECTION, SOLUTION INTRAMUSCULAR; INTRAVENOUS EVERY 6 HOURS PRN
Qty: 8 ML | Refills: 3 | Status: SHIPPED | OUTPATIENT
Start: 2022-10-11

## 2022-10-11 NOTE — TELEPHONE ENCOUNTER
Received fax from pharmacy requesting     Prior authorization needed for ketorolac (TORADOL) 30 MG/ML injection    Date last filled 02.17.2022    Last Appt Date:08.17.2022    Next Appt scheduled: 10.22.2022    Pharmacy:      [id number] - [place], [place] - [address] AT [facility] [address]    Will route to MA POOL for processing    Negin Mora  [facility] Visit Facilitator

## 2022-10-11 NOTE — TELEPHONE ENCOUNTER
Received fax request from [id number] - [place], [place] - [address] AT [facility] [address] pharmacy requesting refill(s) for ketorolac (TORADOL) 30 MG/ML injection    Last refilled on 09/19/2022    Pt last seen on 08/22/22022  Next appt scheduled for 10/17/2022    Will facilitate refill.    Leana Delacruz MA  [facility] [place]

## 2022-10-17 ENCOUNTER — OFFICE VISIT (OUTPATIENT)
Dept: PALLIATIVE MEDICINE | Facility: OTHER | Age: 52
End: 2022-10-17
Payer: COMMERCIAL

## 2022-10-17 VITALS
HEART RATE: 83 BPM | SYSTOLIC BLOOD PRESSURE: 185 MMHG | BODY MASS INDEX: 41.73 KG/M2 | WEIGHT: 315 LBS | DIASTOLIC BLOOD PRESSURE: 109 MMHG

## 2022-10-17 DIAGNOSIS — M54.50 CHRONIC BILATERAL LOW BACK PAIN WITHOUT SCIATICA: ICD-10-CM

## 2022-10-17 DIAGNOSIS — Z79.891 LONG TERM (CURRENT) USE OF OPIATE ANALGESIC: Primary | ICD-10-CM

## 2022-10-17 DIAGNOSIS — G89.29 CHRONIC BILATERAL LOW BACK PAIN WITHOUT SCIATICA: ICD-10-CM

## 2022-10-17 PROCEDURE — 99214 OFFICE O/P EST MOD 30 MIN: CPT | Performed by: ANESTHESIOLOGY

## 2022-10-17 PROCEDURE — 80307 DRUG TEST PRSMV CHEM ANLYZR: CPT | Performed by: ANESTHESIOLOGY

## 2022-10-17 PROCEDURE — G0463 HOSPITAL OUTPT CLINIC VISIT: HCPCS

## 2022-10-17 RX ORDER — KETAMINE HCL 100 %
POWDER (GRAM) MISCELLANEOUS
Qty: 60 G | Refills: 2 | Status: SHIPPED | OUTPATIENT
Start: 2022-10-17 | End: 2023-02-20

## 2022-10-17 ASSESSMENT — PAIN SCALES - GENERAL: PAINLEVEL: SEVERE PAIN (7)

## 2022-10-17 NOTE — NURSING NOTE
Patient presents to the clinic today for a follow up with CHITO HARKINS MD regarding Pain Management.    :  PEG Score 4/25/2022 8/22/2022 10/17/2022   PEG Total Score 5.67 7.33 6           UDS/CSA-12.27.2021      Medications-Ketamine 10.17.2022 @240pm        QUESTIONS:              Negin SERRANO [facility] Visit Facilitator

## 2022-10-17 NOTE — PROGRESS NOTES
Patient presents to the clinic today for a follow up with CHITO HARKINS MD regarding Pain Management.              UDS/CSA-12.27.2021      Medications-Ketamine         QUESTIONS:              Negin Mora  [facility] Visit Facilitator

## 2022-10-17 NOTE — PATIENT INSTRUCTIONS
PLAN    Continue with the ketamine 120 mg lozenge 1/2 lozenge 4 times a day.    Continue with medical cannabis program.    Continue with the Toradol injections no more than 3 injections every week.  Will need to check kidney functions at intervals.    Discussed if you have a flareup of your right rib pain in the future, call the pain clinic to schedule an intercostal nerve block.    You are scheduled for a lumbar radiofrequency ablation with Dr. Puga next month.    Follow-up with Dr. Nagel in 3 months
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
## 2022-10-18 LAB
CANNABINOIDS UR QL SCN: ABNORMAL
CREAT UR-MCNC: 193 MG/DL

## 2022-10-19 LAB
AMPHET UR CFM-MCNC: 3280 NG/ML
AMPHET/CREAT UR: 1699 NG/MG {CREAT}
ETHYL GLUCURONIDE UR QL SCN: NEGATIVE NG/ML
KETAMINE UR QL CFM: PRESENT
METHAMPHET UR CFM-MCNC: ABNORMAL NG/ML
METHAMPHET/CREAT UR: ABNORMAL

## 2022-10-19 NOTE — PROGRESS NOTES
Dago with patient elevated methamphetamine in the urine drug test.  His immediate response is that he is using Sudafed from behind the counter.  He is taken it for congestion.  Discussed he must stop using that, talk to his primary care provider about a different kind of decongestant and that we will randomly check again.  He is quite clear is not using any other sources of methamphetamine

## 2022-11-06 ENCOUNTER — MYC MEDICAL ADVICE (OUTPATIENT)
Dept: FAMILY MEDICINE | Facility: CLINIC | Age: 52
End: 2022-11-06

## 2022-11-07 ENCOUNTER — TELEPHONE (OUTPATIENT)
Dept: PALLIATIVE MEDICINE | Facility: OTHER | Age: 52
End: 2022-11-07

## 2022-11-07 NOTE — TELEPHONE ENCOUNTER
[facility] Call Center    Phone Message    May a detailed message be left on voicemail: yes     Reason for Call: Other: Shuan Calvo Jr. would like to cancel the following appointments: Thelma Puga MD in [facility] ([phone]), 11/7/2022  1:15 PM  [facility] in [facility] ([phone]), 11/7/2022  1:15 PM     Comments:  I'm a little sick, upper respiratory infection, with a hard dry cough. And I fear that the coughing would be a hazard during my procedure, I would like to reschedule an appointment for next Monday with the hopes that this illness will be clear by then.       Action Taken: Other: Routed to Pain      Travel Screening: Not Applicable

## 2022-11-07 NOTE — TELEPHONE ENCOUNTER
Made an appointment for patient with Nancy on 11/08 to follow up for cellulitis. Patient does have upper respiratory infection. Advised to go and get covid testing from [facility] and check to see if he is positive. If he is then we will discuss other options for him to be seen and not in clinic. If he testes negative then he can come to appointment tomorrow.    Ashish Velasquez RN on 11/7/2022 at 9:51 AM

## 2022-11-08 ENCOUNTER — OFFICE VISIT (OUTPATIENT)
Dept: FAMILY MEDICINE | Facility: CLINIC | Age: 52
End: 2022-11-08
Payer: COMMERCIAL

## 2022-11-08 VITALS
OXYGEN SATURATION: 96 % | TEMPERATURE: 98.5 F | BODY MASS INDEX: 42.75 KG/M2 | WEIGHT: 315 LBS | HEART RATE: 103 BPM | SYSTOLIC BLOOD PRESSURE: 190 MMHG | DIASTOLIC BLOOD PRESSURE: 110 MMHG

## 2022-11-08 DIAGNOSIS — B35.1 ONYCHOMYCOSIS: ICD-10-CM

## 2022-11-08 DIAGNOSIS — Z23 HIGH PRIORITY FOR 2019-NCOV VACCINE: ICD-10-CM

## 2022-11-08 DIAGNOSIS — M79.672 LEFT FOOT PAIN: Primary | ICD-10-CM

## 2022-11-08 DIAGNOSIS — I10 ESSENTIAL HYPERTENSION: ICD-10-CM

## 2022-11-08 PROCEDURE — 0134A COVID-19,PF,MODERNA BIVALENT: CPT | Performed by: NURSE PRACTITIONER

## 2022-11-08 PROCEDURE — 99214 OFFICE O/P EST MOD 30 MIN: CPT | Performed by: NURSE PRACTITIONER

## 2022-11-08 PROCEDURE — 36415 COLL VENOUS BLD VENIPUNCTURE: CPT | Performed by: NURSE PRACTITIONER

## 2022-11-08 PROCEDURE — 91313 COVID-19,PF,MODERNA BIVALENT: CPT | Performed by: NURSE PRACTITIONER

## 2022-11-08 PROCEDURE — 84550 ASSAY OF BLOOD/URIC ACID: CPT | Performed by: NURSE PRACTITIONER

## 2022-11-08 RX ORDER — AMLODIPINE BESYLATE 5 MG/1
5 TABLET ORAL DAILY
Qty: 30 TABLET | Refills: 0 | Status: SHIPPED | OUTPATIENT
Start: 2022-11-08 | End: 2022-12-16

## 2022-11-08 RX ORDER — CICLOPIROX 80 MG/ML
SOLUTION TOPICAL
Qty: 6.6 ML | Refills: 3 | Status: SHIPPED | OUTPATIENT
Start: 2022-11-08

## 2022-11-08 RX ORDER — CEPHALEXIN 500 MG/1
500 CAPSULE ORAL 4 TIMES DAILY
Qty: 28 CAPSULE | Refills: 0 | Status: SHIPPED | OUTPATIENT
Start: 2022-11-08 | End: 2022-11-15

## 2022-11-08 ASSESSMENT — ASTHMA QUESTIONNAIRES
QUESTION_2 LAST FOUR WEEKS HOW OFTEN HAVE YOU HAD SHORTNESS OF BREATH: MORE THAN ONCE A DAY
QUESTION_1 LAST FOUR WEEKS HOW MUCH OF THE TIME DID YOUR ASTHMA KEEP YOU FROM GETTING AS MUCH DONE AT WORK, SCHOOL OR AT HOME: SOME OF THE TIME
ACT_TOTALSCORE: 10
QUESTION_5 LAST FOUR WEEKS HOW WOULD YOU RATE YOUR ASTHMA CONTROL: SOMEWHAT CONTROLLED
ACT_TOTALSCORE: 10
QUESTION_3 LAST FOUR WEEKS HOW OFTEN DID YOUR ASTHMA SYMPTOMS (WHEEZING, COUGHING, SHORTNESS OF BREATH, CHEST TIGHTNESS OR PAIN) WAKE YOU UP AT NIGHT OR EARLIER THAN USUAL IN THE MORNING: TWO OR THREE NIGHTS A WEEK
QUESTION_4 LAST FOUR WEEKS HOW OFTEN HAVE YOU USED YOUR RESCUE INHALER OR NEBULIZER MEDICATION (SUCH AS ALBUTEROL): THREE OR MORE TIMES PER DAY

## 2022-11-08 ASSESSMENT — PAIN SCALES - GENERAL: PAINLEVEL: SEVERE PAIN (7)

## 2022-11-08 ASSESSMENT — PATIENT HEALTH QUESTIONNAIRE - PHQ9
SUM OF ALL RESPONSES TO PHQ QUESTIONS 1-9: 13
10. IF YOU CHECKED OFF ANY PROBLEMS, HOW DIFFICULT HAVE THESE PROBLEMS MADE IT FOR YOU TO DO YOUR WORK, TAKE CARE OF THINGS AT HOME, OR GET ALONG WITH OTHER PEOPLE: VERY DIFFICULT
SUM OF ALL RESPONSES TO PHQ QUESTIONS 1-9: 13
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
## 2022-11-09 DIAGNOSIS — M10.9 ACUTE GOUT OF LEFT FOOT, UNSPECIFIED CAUSE: Primary | ICD-10-CM

## 2022-11-09 LAB — URATE SERPL-MCNC: 7.2 MG/DL (ref 3.4–7)

## 2022-11-09 RX ORDER — PREDNISONE 20 MG/1
20 TABLET ORAL 2 TIMES DAILY
Qty: 10 TABLET | Refills: 0 | Status: SHIPPED | OUTPATIENT
Start: 2022-11-09 | End: 2022-11-14

## 2022-11-10 NOTE — TELEPHONE ENCOUNTER
Pt scheduled 12/16 w/Vivien      Screening Questions for RFA Procedure      Procedure ordered? RFA    What insurance are we billing for this procedure?  HP  IF SCHEDULING IN [place], IT IS OKAY TO SCHEDULE. [place] HANDLES THEIR OWN PA'S AFTER THE PATIENT IS SCHEDULED. PLEASE SCHEDULE AT LEAST 1 WEEK OUT SO A PA CAN BE OBTAINED.    Has patient had this injection before? No  This procedure requires that a COVID-19 lab test be done; Either a PCR test from [facility] or a Rapid Home Antigen test.  What test would Pt like to do?  Home Antigen Test    If PCR test from [facility] - Pt must have within 4 days of procedure, let patient know that someone will call them to schedule the COVID-19 test and that they will only receive a call back if the result is positive. Route to nursing to enter order.     If Rapid Home Antigen Test - Pt must have within 2 days of procedure and bring a picture of the negative test with them to the appointment.  Any chance of pregnancy? NO   If YES, do NOT schedule and route to RN pool     Is  Needed?: No  Will patient have a ?  Yes   If pt is given sedation meds, no driving for 24 hours.  Is pt taking a cab or transportation service? NO        If so will need to be accompanied by an adult too (friend/family member) in order for IV sedation to be given.      Per [facility] Policy:  Outpatients are to have responsible adult or family member to accompany them at discharge and drive them home. A service providing medically trained drivers or attendants would be acceptable. Public transportation would not be acceptable unless the patient is accompanied by a responsible adult or family member.  Is patient taking any blood thinners (i.e. plavix, coumadin, jantoven, warfarin, heparin, pradaxa or dabigatran, etc)? No   If YES, do NOT schedule, and route to RN pool    Is patient taking any aspirin products? No     If more than 325mg/day, OK to schedule; Instruct pt to  decrease to less than 325 mg for 7 days AND route to RN pool    For CERVICAL procedures, hold all aspirin products for 6 days.     Tell pt that if aspirin product is not held for 6 days, the procedure WILL BE cancelled.      Does the patient have a bleeding or clotting disorder? No     If YES, it it OKAY to schedule AND route to RN pool    **For any patients with platelet count <100, must be forwarded to provider**    Is patient diabetic? No If YES, have them bring their glucometer.    Does patient have an active infection or treated for one within the past week? Yes - Toe Injection    If YES, do NOT schedule and route to RN nurse pool     Is patient currently taking any antibiotics?  No    For patients on chronic, preventative, or prophylactic antibiotics, procedures may be scheduled.     For patients on antibiotics for active or recent infection:antibiotic course must have been completed for 4 days    Is patient currently taking any steroid medications? (i.e. Prednisone, Medrol)  Yes - Prednisone- Done in 10 days 11/19     For patients on steroid medications, course must have been completed for 4 days    Is patient actively being treated for cancer or immunocompromised, including the spleen having been removed? No  If YES, do NOT schedule and route to RN pool     Any history of complications with sedation medications?  NO   If YES, OK to schedule AND route to RN pool     Any history of sleep apnea?  NO   If YES, OK to schedule AND route to RN pool     Any cardiac history?  NO   If YES, OK to schedule AND route to RN pool     Do you have an implanted pacemaker, ICD (implanted cardiac device) or AICD (automatic implanted cardiac device)?  NO    If YES, do NOT schedule AND route to RN pool.     Obtain name of device :       Obtain name of cardiologist:       Do you have an implanted stimulator?  NO    If YES, OK to schedule AND route to nursing.     Instruct patient to bring in the remote to the  appointment and it will need to be turned off.  reviewed      Does patient have an allergy to contrast dye, iodine or shellfish?  No   If YES, OK to schedule. Route to RN pool AND add allergy information to appointment notes    Are you able to get on and off an exam table with minimal or no assistance? Yes   If NO, do NOT schedule and route to RN pool    Are you able to roll over and lay on your stomach with minimal or no assistance? Yes   If NO, do NOT schedule and route to RN pool    Reminders:    If you are started on any steroids or antibiotics between now and your appointment, you must contact us because it may affect our ability to perform your procedure.  Yes    Informed patient that s/he needs to fast for 6 hours before procedure?  YES    Informed patient that it is OK to take normal medications with sips of water, especially blood pressure medications, before the procedure and must hold blood thinners as instructed.  Yes    Informed patient to arrive 30 minutes before procedure time to have an IV inserted.  reviewed   Do NOT schedule at 0745, 0815 or 1245.  reviewed     All radiofrequency ablations are in a 90 minute time slot.  reviewed

## 2022-11-11 NOTE — TELEPHONE ENCOUNTER
Request sent to  to change location for RFA.      Ginette HENDRICKSON    [facility]

## 2022-12-05 NOTE — TELEPHONE ENCOUNTER
Lluvia from  called to provide the approval Information for the RFA procedure.    Authorization#: [id number]  Dates of approval: 8/29/22 - 8/29/23    Please call Lluvia back for any further questions or concerns regarding this information at [phone].    Anne Medley      [facility]  Pain [facility]

## 2022-12-05 NOTE — TELEPHONE ENCOUNTER
Left  for  department to verify that the request that was faxed on 11/11 was received and the location was changed for the RFA.      Ginette HENDRICKSON    [facility]

## 2023-02-05 ENCOUNTER — HEALTH MAINTENANCE LETTER (OUTPATIENT)
Age: 53
End: 2023-02-05

## 2023-02-20 ENCOUNTER — TELEPHONE (OUTPATIENT)
Dept: PALLIATIVE MEDICINE | Facility: OTHER | Age: 53
End: 2023-02-20
Payer: COMMERCIAL

## 2023-02-20 DIAGNOSIS — G89.29 CHRONIC BILATERAL LOW BACK PAIN WITHOUT SCIATICA: ICD-10-CM

## 2023-02-20 DIAGNOSIS — M54.50 CHRONIC BILATERAL LOW BACK PAIN WITHOUT SCIATICA: ICD-10-CM

## 2023-02-20 RX ORDER — KETAMINE HCL 100 %
POWDER (GRAM) MISCELLANEOUS
Qty: 60 G | Refills: 2 | Status: SHIPPED | OUTPATIENT
Start: 2023-02-20

## 2023-02-20 NOTE — TELEPHONE ENCOUNTER
Pending Prescriptions:                       Disp   Refills    ketamine HCl POWD                         60 g   2            Sig: Ketamine 120 mg one-half under tongue 4 times a           day   [facility]

## 2023-02-20 NOTE — TELEPHONE ENCOUNTER
Received call from patient requesting refill(s) of ketamine HCl POWD    Last dispensed from pharmacy on 01.17.2023    Patient's last office/virtual visit by prescribing provider on 10.17.2022  Next office/virtual appointment scheduled for 04.03.2023    UDT/CSA 10.17.2022    E-prescribe to      [facility] [place], [place] - [address] [place] [address]    Will route to nursing Echo for review and preparation of prescription(s).

## 2023-02-20 NOTE — TELEPHONE ENCOUNTER
Requests Ketamine 120 mg lozenge refill.   Last filled 1/19 for 30 days  Last visit-10/17  Next visit- not scheduled. Was to follow-up in 3 months  UDT/CSA = 10/17/22

## 2023-04-10 ENCOUNTER — TELEPHONE (OUTPATIENT)
Dept: FAMILY MEDICINE | Facility: CLINIC | Age: 53
End: 2023-04-10
Payer: COMMERCIAL

## 2023-04-10 NOTE — TELEPHONE ENCOUNTER
Patient came into the clinic to have covering provider for dr lofton to complete. Patient would like to be called when completed. This form needs to be back by  4.14.23. form placed in dr burns's mailbox. Last vist has been 6mos if not longer.

## 2023-04-18 ENCOUNTER — OFFICE VISIT (OUTPATIENT)
Dept: FAMILY MEDICINE | Facility: CLINIC | Age: 53
End: 2023-04-18
Payer: COMMERCIAL

## 2023-04-18 VITALS
SYSTOLIC BLOOD PRESSURE: 162 MMHG | RESPIRATION RATE: 24 BRPM | DIASTOLIC BLOOD PRESSURE: 116 MMHG | HEART RATE: 79 BPM | TEMPERATURE: 98.1 F | OXYGEN SATURATION: 97 % | WEIGHT: 315 LBS | HEIGHT: 74 IN | BODY MASS INDEX: 40.43 KG/M2

## 2023-04-18 DIAGNOSIS — M17.12 OSTEOARTHRITIS OF LEFT KNEE, UNSPECIFIED OSTEOARTHRITIS TYPE: ICD-10-CM

## 2023-04-18 DIAGNOSIS — G89.29 CHRONIC BILATERAL LOW BACK PAIN WITHOUT SCIATICA: ICD-10-CM

## 2023-04-18 DIAGNOSIS — I10 ESSENTIAL HYPERTENSION: ICD-10-CM

## 2023-04-18 DIAGNOSIS — G89.29 OTHER CHRONIC PAIN: Primary | ICD-10-CM

## 2023-04-18 DIAGNOSIS — M54.50 CHRONIC BILATERAL LOW BACK PAIN WITHOUT SCIATICA: ICD-10-CM

## 2023-04-18 PROCEDURE — 99214 OFFICE O/P EST MOD 30 MIN: CPT | Performed by: FAMILY MEDICINE

## 2023-04-18 RX ORDER — AMLODIPINE BESYLATE 5 MG/1
5 TABLET ORAL DAILY
Qty: 90 TABLET | Refills: 0 | Status: SHIPPED | OUTPATIENT
Start: 2023-04-18 | End: 2024-07-19

## 2023-04-18 ASSESSMENT — ASTHMA QUESTIONNAIRES
ACT_TOTALSCORE: 16
QUESTION_2 LAST FOUR WEEKS HOW OFTEN HAVE YOU HAD SHORTNESS OF BREATH: MORE THAN ONCE A DAY
QUESTION_5 LAST FOUR WEEKS HOW WOULD YOU RATE YOUR ASTHMA CONTROL: WELL CONTROLLED
QUESTION_4 LAST FOUR WEEKS HOW OFTEN HAVE YOU USED YOUR RESCUE INHALER OR NEBULIZER MEDICATION (SUCH AS ALBUTEROL): ONE OR TWO TIMES PER DAY
ACT_TOTALSCORE: 16
QUESTION_1 LAST FOUR WEEKS HOW MUCH OF THE TIME DID YOUR ASTHMA KEEP YOU FROM GETTING AS MUCH DONE AT WORK, SCHOOL OR AT HOME: A LITTLE OF THE TIME
QUESTION_3 LAST FOUR WEEKS HOW OFTEN DID YOUR ASTHMA SYMPTOMS (WHEEZING, COUGHING, SHORTNESS OF BREATH, CHEST TIGHTNESS OR PAIN) WAKE YOU UP AT NIGHT OR EARLIER THAN USUAL IN THE MORNING: NOT AT ALL

## 2023-04-18 ASSESSMENT — PATIENT HEALTH QUESTIONNAIRE - PHQ9
10. IF YOU CHECKED OFF ANY PROBLEMS, HOW DIFFICULT HAVE THESE PROBLEMS MADE IT FOR YOU TO DO YOUR WORK, TAKE CARE OF THINGS AT HOME, OR GET ALONG WITH OTHER PEOPLE: VERY DIFFICULT
SUM OF ALL RESPONSES TO PHQ QUESTIONS 1-9: 11
SUM OF ALL RESPONSES TO PHQ QUESTIONS 1-9: 11

## 2023-04-18 NOTE — PROGRESS NOTES
"  Assessment & Plan     Other chronic pain  Patient presents today primarily for filling out a form for unemployment insurance.  He has been on unemployment since approximately July of last year and Dr. Vo previously filled out this form.  He has some limitations in terms of being able to perform his job and these were outlined on the form.  This was faxed today.  I outlined that he will have these restrictions for 1 year.  At that time he should be reassessed.    Essential hypertension  Not under good control.  Never filled or took amlodipine per his recollection.  Resent this and asked him to compare his home medications with his med list on the after visit summary.  Recommended follow-up for recheck with Dr. Vo.  - amLODIPine (NORVASC) 5 MG tablet; Take 1 tablet (5 mg) by mouth daily    Chronic bilateral low back pain without sciatica  Patient was scheduled for radiofrequency ablation, unclear if this is still happening.  He follows with pain clinic and is currently taking ketamine, medical cannabis, periodic Toradol injections.  He walks with a cane.    Osteoarthritis of left knee, unspecified osteoarthritis type  Patient has followed with [facility].  He was last seen in September and had his joint aspirated.  He does not feel that he can undergo joint replacement surgery at this time due to his current living situation.        34 minutes spent by me on the date of the encounter doing chart review, history and exam, documentation and further activities per the note       BMI:   Estimated body mass index is 44.68 kg/m  as calculated from the following:    Height as of this encounter: 1.88 m (6' 2\").    Weight as of this encounter: 157.9 kg (348 lb).           Kristal Lambert MD  [facility]    Tera Sandoavl is a 53 year old, presenting for the following health issues:  Forms (Medical Statement Form)      History of Present Illness       Reason for visit:  Paperwork " "for unemployment    He eats 0-1 servings of fruits and vegetables daily.He consumes 4 sweetened beverage(s) daily.He exercises with enough effort to increase his heart rate 60 or more minutes per day.  He exercises with enough effort to increase his heart rate 6 days per week.   He is taking medications regularly.    Today's PHQ-9         PHQ-9 Total Score: 11    PHQ-9 Q9 Thoughts of better off dead/self-harm past 2 weeks :   Not at all    How difficult have these problems made it for you to do your work, take care of things at home, or get along with other people: Very difficult     Patient presents today as a new patient to me to have a form filled out for unemployment insurance.  He is a patient of Dr. Dominguez and she is currently out of the clinic.  He notes that he worked as a  and in other jobs in the  industry.  Because of his chronic low back pain and osteoarthritis of the left knee, he wears a brace and walks with a cane.  He is unable to work as a  due to his need for the cane.  He also has difficulties reaching over his shoulders, lifting, squatting, bending.  He follows with [facility] pain clinic and is currently using ketamine, medical cannabis, and receiving periodic Toradol injections.  There is mention made of radiofrequency ablation, patient did not have this procedure.  He follows with [facility] for osteoarthritis of his left knee and had this aspirated in September.  At that time he was too early for cortisone injection.  He states that he has a meniscal tear in the left knee but is unable to have surgery right now because there is \"nobody to take care of me\", and he does not think being in a rehab facility would be something he would like to do.  He does have a blood pressure cuff at home, but this is a wrist cuff and he \"does not believe the readings\".  He feels well in this regard, denies chest pain, headaches, or other neurologic symptoms.  He does not " "think he ever filled or took amlodipine as previously prescribed.  He mentions that his ketamine has become more expensive, going from $80 per month to $105 per month.  He is wondering if anything can be done to lower the cost.  He sees Dr. Nagel later this month.        Review of Systems   Constitutional, HEENT, cardiovascular, pulmonary, gi and gu systems are negative, except as otherwise noted.      Objective    BP (!) 162/116   Pulse 79   Temp 98.1  F (36.7  C) (Oral)   Resp 24   Ht 1.88 m (6' 2\")   Wt (!) 157.9 kg (348 lb)   SpO2 97%   BMI 44.68 kg/m    Body mass index is 44.68 kg/m .  Physical Exam   GENERAL: healthy, alert and no distress  RESP: Breathing comfortably, no cough during the visit  CV: regular rate and rhythm, normal S1 S2, no S3 or S4, no murmur, click or rub, no peripheral edema and peripheral pulses strong  MS: Hinged brace on left knee, walking with a cane  SKIN: no suspicious lesions or rashes  NEURO: mentation intact and speech normal  PSYCH: mentation appears normal, affect normal/bright    Diagnostics: None    Reviewed MRI left knee from 6/22/2021: Tear of the posterior horn of the medial meniscus, severe lateral aspect patellofemoral compartment chondrosis.  Moderate knee joint effusion.                "

## 2023-04-26 DIAGNOSIS — J45.20 MILD INTERMITTENT ASTHMA WITHOUT COMPLICATION: ICD-10-CM

## 2023-04-27 RX ORDER — ALBUTEROL SULFATE 90 UG/1
AEROSOL, METERED RESPIRATORY (INHALATION)
Qty: 18 G | Refills: 0 | Status: SHIPPED | OUTPATIENT
Start: 2023-04-27 | End: 2023-08-02

## 2023-04-27 NOTE — TELEPHONE ENCOUNTER
"Routing refill request to provider for review/approval because:  Asthma control assessment score    Last Written Prescription Date:  3/16/2023  Last Fill Quantity: 18g,  # refills: 0   Last office visit provider:  4/18/2023     Requested Prescriptions   Pending Prescriptions Disp Refills     albuterol (PROAIR HFA/PROVENTIL HFA/VENTOLIN HFA) 108 (90 Base) MCG/ACT inhaler [Pharmacy Med Name: ALBUTEROL HFA INH(200 PUFFS) 18GM] 18 g 0     Sig: INHALE 2 PUFFS BY MOUTH EVERY 4 HOURS AS NEEDED       Asthma Maintenance Inhalers - Anticholinergics Failed - 4/26/2023  8:24 AM        Failed - Asthma control assessment score within normal limits in last 6 months     Please review ACT score.           Passed - Patient is age 12 years or older        Passed - Medication is active on med list        Passed - Recent (6 mo) or future (30 days) visit within the authorizing provider's specialty     Patient had office visit in the last 6 months or has a visit in the next 30 days with authorizing provider or within the authorizing provider's specialty.  See \"Patient Info\" tab in inbasket, or \"Choose Columns\" in Meds & Orders section of the refill encounter.           Short-Acting Beta Agonist Inhalers Protocol  Failed - 4/26/2023  8:24 AM        Failed - Asthma control assessment score within normal limits in last 6 months     Please review ACT score.           Passed - Patient is age 12 or older        Passed - Medication is active on med list        Passed - Recent (6 mo) or future (30 days) visit within the authorizing provider's specialty     Patient had office visit in the last 6 months or has a visit in the next 30 days with authorizing provider or within the authorizing provider's specialty.  See \"Patient Info\" tab in inbasket, or \"Choose Columns\" in Meds & Orders section of the refill encounter.                 Lizzie Encinas RN 04/26/23 11:42 PM  "

## 2023-07-06 ENCOUNTER — E-VISIT (OUTPATIENT)
Dept: FAMILY MEDICINE | Facility: CLINIC | Age: 53
End: 2023-07-06
Payer: COMMERCIAL

## 2023-07-06 DIAGNOSIS — M79.7 FIBROMYALGIA: Primary | ICD-10-CM

## 2023-07-06 PROCEDURE — 99207 PR NON-BILLABLE SERV PER CHARTING: CPT | Performed by: FAMILY MEDICINE

## 2023-07-06 NOTE — TELEPHONE ENCOUNTER
Provider E-Visit time total (minutes): 0    I do not do medical marijuana certification - please help pt schedule with someone who does (may have to be at a different clinic)    Dr Vo

## 2023-07-11 ENCOUNTER — TELEPHONE (OUTPATIENT)
Dept: FAMILY MEDICINE | Facility: CLINIC | Age: 53
End: 2023-07-11
Payer: COMMERCIAL

## 2023-07-11 NOTE — TELEPHONE ENCOUNTER
He has an appointment scheduled on Thursday with Nancy Perla.    I believe this is to discuss medical [facility] re-certification.    Will need an appointment with a provider that does this / Will need to reschedule/cancel appointment     Dr Velazquez does this but unfortunately has very limited availability the next few weeks.    I am wondering if Dr Vo or DR Garcia might be certified?    Maybe we can try to look into this some more.    Thank you

## 2023-07-12 ENCOUNTER — TELEPHONE (OUTPATIENT)
Dept: FAMILY MEDICINE | Facility: CLINIC | Age: 53
End: 2023-07-12
Payer: COMMERCIAL

## 2023-07-12 NOTE — TELEPHONE ENCOUNTER
I only do these for my patients. I am unsure if Gilda does these. If patient wishes for me to do this he needs to establish with me    Neville Quinteros MD

## 2023-07-12 NOTE — TELEPHONE ENCOUNTER
Patient had video appointment to be set up for a medical marijuana  card patient set appointment through my chart and set it up with Nancy wallace and she doesn't do those per clinic manager Dr Garcia does these recertifications

## 2023-07-14 NOTE — TELEPHONE ENCOUNTER
HI I am wondering if you can help schedule a visit for him with DR Garcia so he can re-certify him for his medical marijuana.    I think he just needs a 20 minute office visit. thanks

## 2023-08-02 ENCOUNTER — OFFICE VISIT (OUTPATIENT)
Dept: FAMILY MEDICINE | Facility: CLINIC | Age: 53
End: 2023-08-02
Payer: COMMERCIAL

## 2023-08-02 VITALS
DIASTOLIC BLOOD PRESSURE: 126 MMHG | HEART RATE: 94 BPM | RESPIRATION RATE: 16 BRPM | HEIGHT: 74 IN | TEMPERATURE: 98.2 F | BODY MASS INDEX: 40.43 KG/M2 | SYSTOLIC BLOOD PRESSURE: 174 MMHG | OXYGEN SATURATION: 98 % | WEIGHT: 315 LBS

## 2023-08-02 DIAGNOSIS — F43.10 PTSD (POST-TRAUMATIC STRESS DISORDER): ICD-10-CM

## 2023-08-02 DIAGNOSIS — I10 ESSENTIAL HYPERTENSION: ICD-10-CM

## 2023-08-02 DIAGNOSIS — M79.7 FIBROMYALGIA: ICD-10-CM

## 2023-08-02 DIAGNOSIS — J45.20 MILD INTERMITTENT ASTHMA WITHOUT COMPLICATION: Primary | ICD-10-CM

## 2023-08-02 DIAGNOSIS — G89.29 OTHER CHRONIC PAIN: ICD-10-CM

## 2023-08-02 PROCEDURE — 99214 OFFICE O/P EST MOD 30 MIN: CPT | Performed by: NURSE PRACTITIONER

## 2023-08-02 RX ORDER — ALBUTEROL SULFATE 90 UG/1
AEROSOL, METERED RESPIRATORY (INHALATION)
Qty: 18 G | Refills: 3 | Status: SHIPPED | OUTPATIENT
Start: 2023-08-02 | End: 2024-02-14

## 2023-08-02 ASSESSMENT — PATIENT HEALTH QUESTIONNAIRE - PHQ9
SUM OF ALL RESPONSES TO PHQ QUESTIONS 1-9: 9
SUM OF ALL RESPONSES TO PHQ QUESTIONS 1-9: 9
10. IF YOU CHECKED OFF ANY PROBLEMS, HOW DIFFICULT HAVE THESE PROBLEMS MADE IT FOR YOU TO DO YOUR WORK, TAKE CARE OF THINGS AT HOME, OR GET ALONG WITH OTHER PEOPLE: SOMEWHAT DIFFICULT

## 2023-08-02 ASSESSMENT — PAIN SCALES - GENERAL: PAINLEVEL: NO PAIN (0)

## 2023-08-02 NOTE — PROGRESS NOTES
"Assessment & Plan     ICD-10-CM    1. Mild intermittent asthma without complication  J45.20 albuterol (PROAIR HFA/PROVENTIL HFA/VENTOLIN HFA) 108 (90 Base) MCG/ACT inhaler      2. Essential Hypertension  I10       3. PTSD (post-traumatic stress disorder)  F43.10       4. Other chronic pain  G89.29       5. Fibromyalgia  M79.7         Uncontrolled blood pressure.  Reviewed with the patient the importance of good blood pressure control for cardiovascular health.  Encouraged to actually take the antihypertensives and follow-up with PCP for further management.  Refill of albuterol sent to the pharmacy for asthma management.  Paperwork completed for [place] medical cannabis.    Reviewed family medicine note x2.  Metabolic panel x1    Subjective     HPI     PTSD/chronic pain/fibromyalgia  Flower works well. Vaping harsh on the lungs.  Uses about 4 times a day.  Gets some dry mouth. Appetite increases.      Htn  Not taking amlodipine.  Asymptomatic.  Very uncontrolled today      Review of Systems - negative except for what's listed in the HPI      Objective    BP (!) 174/126 (BP Location: Right arm, Patient Position: Sitting, Cuff Size: Adult Large)   Pulse 94   Temp 98.2  F (36.8  C) (Oral)   Resp 16   Ht 1.88 m (6' 2\")   Wt (!) 162.3 kg (357 lb 12.8 oz)   SpO2 98%   BMI 45.94 kg/m    Physical Exam   General appearance - alert, well appearing, and in no distress  Mental status - alert, oriented to person, place, and time  Chest - clear to auscultation, no wheezes, rales or rhonchi, symmetric air entry  Heart - normal rate and regular rhythm, S1 and S2 normal, no murmurs noted  Neurological - alert, oriented, normal speech, no focal findings or movement disorder noted, neck supple without rigidity, cranial nerves II through XII intact, motor and sensory grossly normal bilaterally, normal muscle tone, no tremors, strength 5/5  Extremities - peripheral pulses normal, +1 lower extremity edema  Skin - normal " coloration and turgor.    Nitin Green, CNP    This note has been dictated using voice recognition software. Any grammatical or context distortions are unintentional and inherent to the software.

## 2023-08-02 NOTE — PATIENT INSTRUCTIONS
I'll get your recertification paperwork filled out today.    Take the amlodipine! We want to reduce your heart attack and stroke risk.    Albuterol refills sent off to the pharmacy.

## 2023-11-05 NOTE — TELEPHONE ENCOUNTER
Patient dropped off unemployment insurance form to be filled out and fax, and is due tomorrow 7/15/22.  He said he was unable to work from June 7th to June 27th. He also miss from July 7th through the 11th. Due to the pain the abdomen. He also explain he went to the ER.   Thank you    Yes

## 2023-11-08 ENCOUNTER — TRANSFERRED RECORDS (OUTPATIENT)
Dept: HEALTH INFORMATION MANAGEMENT | Facility: CLINIC | Age: 53
End: 2023-11-08
Payer: COMMERCIAL

## 2024-02-13 DIAGNOSIS — M51.26 LUMBAR DISC HERNIATION: ICD-10-CM

## 2024-02-13 DIAGNOSIS — M47.816 SPONDYLOSIS OF LUMBAR REGION WITHOUT MYELOPATHY OR RADICULOPATHY: ICD-10-CM

## 2024-02-13 DIAGNOSIS — M54.16 LUMBAR RADICULITIS: ICD-10-CM

## 2024-02-13 DIAGNOSIS — J45.20 MILD INTERMITTENT ASTHMA WITHOUT COMPLICATION: ICD-10-CM

## 2024-02-14 RX ORDER — ALBUTEROL SULFATE 90 UG/1
AEROSOL, METERED RESPIRATORY (INHALATION)
Qty: 18 G | Refills: 3 | Status: SHIPPED | OUTPATIENT
Start: 2024-02-14 | End: 2024-06-25

## 2024-02-14 RX ORDER — CYCLOBENZAPRINE HCL 10 MG
TABLET ORAL
Qty: 60 TABLET | Refills: 3 | Status: SHIPPED | OUTPATIENT
Start: 2024-02-14

## 2024-03-03 ENCOUNTER — HEALTH MAINTENANCE LETTER (OUTPATIENT)
Age: 54
End: 2024-03-03

## 2024-06-25 DIAGNOSIS — J45.20 MILD INTERMITTENT ASTHMA WITHOUT COMPLICATION: ICD-10-CM

## 2024-06-25 RX ORDER — ALBUTEROL SULFATE 90 UG/1
AEROSOL, METERED RESPIRATORY (INHALATION)
Qty: 18 G | Refills: 3 | Status: SHIPPED | OUTPATIENT
Start: 2024-06-25 | End: 2024-07-19

## 2024-07-19 ENCOUNTER — OFFICE VISIT (OUTPATIENT)
Dept: FAMILY MEDICINE | Facility: CLINIC | Age: 54
End: 2024-07-19
Payer: COMMERCIAL

## 2024-07-19 VITALS
RESPIRATION RATE: 18 BRPM | BODY MASS INDEX: 40.43 KG/M2 | HEART RATE: 96 BPM | DIASTOLIC BLOOD PRESSURE: 110 MMHG | TEMPERATURE: 98.1 F | OXYGEN SATURATION: 98 % | SYSTOLIC BLOOD PRESSURE: 150 MMHG | WEIGHT: 315 LBS | HEIGHT: 74 IN

## 2024-07-19 DIAGNOSIS — Z12.11 SCREEN FOR COLON CANCER: ICD-10-CM

## 2024-07-19 DIAGNOSIS — Z13.1 SCREENING FOR DIABETES MELLITUS: ICD-10-CM

## 2024-07-19 DIAGNOSIS — I10 ESSENTIAL HYPERTENSION: ICD-10-CM

## 2024-07-19 DIAGNOSIS — J45.20 MILD INTERMITTENT ASTHMA WITHOUT COMPLICATION: Primary | ICD-10-CM

## 2024-07-19 DIAGNOSIS — Z12.5 SCREENING FOR PROSTATE CANCER: ICD-10-CM

## 2024-07-19 DIAGNOSIS — Z13.220 LIPID SCREENING: ICD-10-CM

## 2024-07-19 DIAGNOSIS — J45.20 MILD INTERMITTENT ASTHMA WITHOUT COMPLICATION: ICD-10-CM

## 2024-07-19 LAB
ALBUMIN SERPL BCG-MCNC: 4.3 G/DL (ref 3.5–5.2)
ALP SERPL-CCNC: 83 U/L (ref 40–150)
ALT SERPL W P-5'-P-CCNC: 25 U/L (ref 0–70)
ANION GAP SERPL CALCULATED.3IONS-SCNC: 10 MMOL/L (ref 7–15)
AST SERPL W P-5'-P-CCNC: 30 U/L (ref 0–45)
BILIRUB SERPL-MCNC: 0.3 MG/DL
BUN SERPL-MCNC: 13.5 MG/DL (ref 6–20)
CALCIUM SERPL-MCNC: 9.5 MG/DL (ref 8.8–10.4)
CHLORIDE SERPL-SCNC: 104 MMOL/L (ref 98–107)
CHOLEST SERPL-MCNC: 172 MG/DL
CREAT SERPL-MCNC: 1.18 MG/DL (ref 0.67–1.17)
EGFRCR SERPLBLD CKD-EPI 2021: 73 ML/MIN/1.73M2
FASTING STATUS PATIENT QL REPORTED: ABNORMAL
FASTING STATUS PATIENT QL REPORTED: ABNORMAL
GLUCOSE SERPL-MCNC: 117 MG/DL (ref 70–99)
HBA1C MFR BLD: 6.2 % (ref 0–5.6)
HCO3 SERPL-SCNC: 25 MMOL/L (ref 22–29)
HDLC SERPL-MCNC: 28 MG/DL
LDLC SERPL CALC-MCNC: 64 MG/DL
NONHDLC SERPL-MCNC: 144 MG/DL
POTASSIUM SERPL-SCNC: 3.6 MMOL/L (ref 3.4–5.3)
PROT SERPL-MCNC: 6.9 G/DL (ref 6.4–8.3)
PSA SERPL DL<=0.01 NG/ML-MCNC: 0.55 NG/ML (ref 0–3.5)
SODIUM SERPL-SCNC: 139 MMOL/L (ref 135–145)
TRIGL SERPL-MCNC: 400 MG/DL

## 2024-07-19 PROCEDURE — 99214 OFFICE O/P EST MOD 30 MIN: CPT | Performed by: NURSE PRACTITIONER

## 2024-07-19 PROCEDURE — 36415 COLL VENOUS BLD VENIPUNCTURE: CPT | Performed by: NURSE PRACTITIONER

## 2024-07-19 PROCEDURE — 80053 COMPREHEN METABOLIC PANEL: CPT | Performed by: NURSE PRACTITIONER

## 2024-07-19 PROCEDURE — 83036 HEMOGLOBIN GLYCOSYLATED A1C: CPT | Performed by: NURSE PRACTITIONER

## 2024-07-19 PROCEDURE — 80061 LIPID PANEL: CPT | Performed by: NURSE PRACTITIONER

## 2024-07-19 PROCEDURE — G0103 PSA SCREENING: HCPCS | Performed by: NURSE PRACTITIONER

## 2024-07-19 RX ORDER — ALBUTEROL SULFATE 90 UG/1
AEROSOL, METERED RESPIRATORY (INHALATION)
Qty: 54 G | Refills: 3 | Status: SHIPPED | OUTPATIENT
Start: 2024-07-19

## 2024-07-19 RX ORDER — LISINOPRIL 40 MG/1
40 TABLET ORAL DAILY
Qty: 90 TABLET | Refills: 0 | Status: SHIPPED | OUTPATIENT
Start: 2024-07-19

## 2024-07-19 RX ORDER — FLUTICASONE PROPIONATE 110 UG/1
1 AEROSOL, METERED RESPIRATORY (INHALATION) 2 TIMES DAILY
Qty: 12 G | Refills: 2 | Status: SHIPPED | OUTPATIENT
Start: 2024-07-19

## 2024-07-19 SDOH — HEALTH STABILITY: PHYSICAL HEALTH: ON AVERAGE, HOW MANY DAYS PER WEEK DO YOU ENGAGE IN MODERATE TO STRENUOUS EXERCISE (LIKE A BRISK WALK)?: 0 DAYS

## 2024-07-19 SDOH — HEALTH STABILITY: PHYSICAL HEALTH: ON AVERAGE, HOW MANY MINUTES DO YOU ENGAGE IN EXERCISE AT THIS LEVEL?: 0 MIN

## 2024-07-19 ASSESSMENT — PAIN SCALES - GENERAL: PAINLEVEL: NO PAIN (0)

## 2024-07-19 ASSESSMENT — PATIENT HEALTH QUESTIONNAIRE - PHQ9
SUM OF ALL RESPONSES TO PHQ QUESTIONS 1-9: 18
10. IF YOU CHECKED OFF ANY PROBLEMS, HOW DIFFICULT HAVE THESE PROBLEMS MADE IT FOR YOU TO DO YOUR WORK, TAKE CARE OF THINGS AT HOME, OR GET ALONG WITH OTHER PEOPLE: VERY DIFFICULT
SUM OF ALL RESPONSES TO PHQ QUESTIONS 1-9: 18

## 2024-07-19 ASSESSMENT — ASTHMA QUESTIONNAIRES
QUESTION_3 LAST FOUR WEEKS HOW OFTEN DID YOUR ASTHMA SYMPTOMS (WHEEZING, COUGHING, SHORTNESS OF BREATH, CHEST TIGHTNESS OR PAIN) WAKE YOU UP AT NIGHT OR EARLIER THAN USUAL IN THE MORNING: FOUR OR MORE NIGHTS A WEEK
ACT_TOTALSCORE: 13
QUESTION_2 LAST FOUR WEEKS HOW OFTEN HAVE YOU HAD SHORTNESS OF BREATH: MORE THAN ONCE A DAY
QUESTION_5 LAST FOUR WEEKS HOW WOULD YOU RATE YOUR ASTHMA CONTROL: NOT CONTROLLED AT ALL
QUESTION_1 LAST FOUR WEEKS HOW MUCH OF THE TIME DID YOUR ASTHMA KEEP YOU FROM GETTING AS MUCH DONE AT WORK, SCHOOL OR AT HOME: NONE OF THE TIME
QUESTION_4 LAST FOUR WEEKS HOW OFTEN HAVE YOU USED YOUR RESCUE INHALER OR NEBULIZER MEDICATION (SUCH AS ALBUTEROL): NOT AT ALL
ACT_TOTALSCORE: 13

## 2024-07-19 ASSESSMENT — SOCIAL DETERMINANTS OF HEALTH (SDOH): HOW OFTEN DO YOU GET TOGETHER WITH FRIENDS OR RELATIVES?: TWICE A WEEK

## 2024-07-19 NOTE — LETTER
"July 24, 2024      Jaime Calvo Jr.  [address] [place] [address]        Dear ,    Cholesterol levels look okay but triglycerides are elevated.  These can be improved with weight loss, limiting carbohydrates in the diet, and avoiding alcohol.  I would recommend doing all of these things.  Sugars are elevated in the \"prediabetes range \".  Once again, try to get regular exercise, eat healthy, and lose weight.  Prostate cancer screen is normal.  Kidneys and liver look okay along with electrolytes.     Resulted Orders   Hemoglobin A1c   Result Value Ref Range    Hemoglobin A1C 6.2 (H) 0.0 - 5.6 %      Comment:      Normal <5.7%   Prediabetes 5.7-6.4%    Diabetes 6.5% or higher     Note: Adopted from ADA consensus guidelines.   Lipid panel   Result Value Ref Range    Cholesterol 172 <200 mg/dL    Triglycerides 400 (H) <150 mg/dL    Direct Measure HDL 28 (L) >=40 mg/dL    LDL Cholesterol Calculated 64 <=100 mg/dL    Non HDL Cholesterol 144 (H) <130 mg/dL    Patient Fasting > 8hrs? Unknown     Narrative    Cholesterol  Desirable:  <200 mg/dL    Triglycerides  Normal:  Less than 150 mg/dL  Borderline High:  150-199 mg/dL  High:  200-499 mg/dL  Very High:  Greater than or equal to 500 mg/dL    Direct Measure HDL  Female:  Greater than or equal to 50 mg/dL   Male:  Greater than or equal to 40 mg/dL    LDL Cholesterol  Desirable:  <100mg/dL  Above Desirable:  100-129 mg/dL   Borderline High:  130-159 mg/dL   High:  160-189 mg/dL   Very High:  >= 190 mg/dL    Non HDL Cholesterol  Desirable:  130 mg/dL  Above Desirable:  130-159 mg/dL  Borderline High:  160-189 mg/dL  High:  190-219 mg/dL  Very High:  Greater than or equal to 220 mg/dL   Comprehensive metabolic panel (BMP + Alb, Alk Phos, ALT, AST, Total. Bili, TP)   Result Value Ref Range    Sodium 139 135 - 145 mmol/L    Potassium 3.6 3.4 - 5.3 mmol/L    Carbon Dioxide (CO2) 25 22 - 29 mmol/L    Anion Gap 10 7 - 15 mmol/L    Urea Nitrogen 13.5 6.0 - 20.0 " mg/dL    Creatinine 1.18 (H) 0.67 - 1.17 mg/dL    GFR Estimate 73 >60 mL/min/1.73m2      Comment:      eGFR calculated using 2021 CKD-EPI equation.    Calcium 9.5 8.8 - 10.4 mg/dL      Comment:      Reference intervals for this test were updated on 7/16/2024 to reflect our healthy population more accurately. There may be differences in the flagging of prior results with similar values performed with this method. Those prior results can be interpreted in the context of the updated reference intervals.    Chloride 104 98 - 107 mmol/L    Glucose 117 (H) 70 - 99 mg/dL    Alkaline Phosphatase 83 40 - 150 U/L    AST 30 0 - 45 U/L    ALT 25 0 - 70 U/L    Protein Total 6.9 6.4 - 8.3 g/dL    Albumin 4.3 3.5 - 5.2 g/dL    Bilirubin Total 0.3 <=1.2 mg/dL    Patient Fasting > 8hrs? Unknown    PSA, screen   Result Value Ref Range    Prostate Specific Antigen Screen 0.55 0.00 - 3.50 ng/mL    Narrative    This result is obtained using the Roche Elecsys total PSA method on the amanda [id number] immunoassay analyzer. Results obtained with different assay methods or kits cannot be used interchangeably.       If you have any questions or concerns, please call the clinic at the number listed above.       Sincerely,      Nitin Green NP

## 2024-07-19 NOTE — PROGRESS NOTES
Assessment & Plan     ICD-10-CM    1. Mild intermittent asthma without complication  J45.20 albuterol (VENTOLIN HFA) 108 (90 Base) MCG/ACT inhaler     fluticasone (FLOVENT HFA) 110 MCG/ACT inhaler      2. Essential hypertension  I10 lisinopril (ZESTRIL) 40 MG tablet     Comprehensive metabolic panel (BMP + Alb, Alk Phos, ALT, AST, Total. Bili, TP)     Comprehensive metabolic panel (BMP + Alb, Alk Phos, ALT, AST, Total. Bili, TP)      3. Screening for diabetes mellitus  Z13.1 Hemoglobin A1c     Hemoglobin A1c      4. Lipid screening  Z13.220 Lipid panel     Lipid panel      5. Screening for prostate cancer  Z12.5 PSA, screen     PSA, screen      6. Screen for colon cancer  Z12.11         Uncontrolled hypertension.  Instead of restarting all 3 antihypertensives at the same time, start with lisinopril.  Report blood pressure numbers back in a month and recheck metabolic panel.  At that time, consider bringing back HCTZ or amlodipine.  Refill of albuterol sent to the pharmacy.  See if fluticasone inhaler is covered.  Update screening labs.    Reviewed family medicine note x 4    Subjective     HPI     Patient presents today as a bridge to get back to his primary care provider.    Longstanding history of uncontrolled hypertension.  Is not taking any of his antihypertensives.  Asymptomatic.    Would like to get paperwork resubmitted for medical cannabis.    Has been having more wheezing/coughing issues.  History of asthma noted in chart.  Apparently steroid inhalers were attempted but not covered by insurance years ago.  He is open to trying again.  Using albuterol multiple times a day.  It does provide relief, but only for 4-6 hours.  No chest pain.  No new/worsening LE swelling.    Review of Systems - negative except for what's listed in the HPI      Objective    BP (!) 150/110 (BP Location: Left arm, Patient Position: Sitting, Cuff Size: Adult Large)   Pulse 96   Temp 98.1  F (36.7  C) (Oral)   Resp 18   Ht 1.88 m  "(6' 2\")   Wt (!) 160.6 kg (354 lb)   SpO2 98%   BMI 45.45 kg/m    Physical Exam   General appearance - alert, well appearing, and in no distress  Mental status - alert, oriented to person, place, and time  Eyes -PERRLA  Ears - bilateral TM's and external ear canals normal  Nose - normal and patent, no erythema, discharge or polyps  Mouth - mucous membranes moist. No oral lesions.  Neck - no significant adenopathy  Lymphatics - no palpable lymphadenopathy  Chest - clear to auscultation, no wheezes, rales or rhonchi, symmetric air entry  Heart - normal rate and regular rhythm, S1 and S2 normal, no murmurs noted  Abdomen - soft, nontender, nondistended, no masses or organomegaly  Neurological - alert, oriented, normal speech, no focal findings or movement disorder noted, neck supple without rigidity, cranial nerves II through XII intact, motor and sensory grossly normal bilaterally, normal muscle tone, no tremors, strength 5/5  Musculoskeletal - no joint tenderness, deformity or swelling  Extremities - peripheral pulses normal, no peripheral edema  Skin - normal coloration and turgor.    Nitin Green, CNP    This note has been dictated using voice recognition software. Any grammatical or context distortions are unintentional and inherent to the software.      "

## 2024-07-19 NOTE — PATIENT INSTRUCTIONS
I did resubmit your paperwork for medical cannabis certification.  If you do not get an email within 2 business days, call [phone]    We should work on getting your blood pressure under better control.  I sent the lisinopril to your pharmacy that you used to take.    Updating routine screening labs while we have you here today.    I sent refills of the albuterol to hopefully get 3 inhalers at a time.  It also looks like your insurance may pay for the fluticasone maintenance inhaler.  Remember, this is the steroid inhaler to keep your airway open.  It may take a couple weeks to start working.  Rinse and spit after using.

## 2024-07-22 RX ORDER — FLUTICASONE PROPIONATE 110 UG/1
1 AEROSOL, METERED RESPIRATORY (INHALATION) 2 TIMES DAILY
Qty: 36 G | OUTPATIENT
Start: 2024-07-22

## 2024-09-04 ENCOUNTER — TELEPHONE (OUTPATIENT)
Dept: FAMILY MEDICINE | Facility: CLINIC | Age: 54
End: 2024-09-04
Payer: COMMERCIAL

## 2024-09-04 NOTE — TELEPHONE ENCOUNTER
Pt dropped off form to be filled out. He has an upcoming appt with PCP. If it gets completed prior to the appt he would like a call to come pick it up.
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
## 2024-10-08 ENCOUNTER — OFFICE VISIT (OUTPATIENT)
Dept: FAMILY MEDICINE | Facility: CLINIC | Age: 54
End: 2024-10-08
Payer: COMMERCIAL

## 2024-10-08 VITALS
DIASTOLIC BLOOD PRESSURE: 80 MMHG | SYSTOLIC BLOOD PRESSURE: 130 MMHG | RESPIRATION RATE: 18 BRPM | HEIGHT: 74 IN | TEMPERATURE: 98.1 F | WEIGHT: 315 LBS | HEART RATE: 94 BPM | BODY MASS INDEX: 40.43 KG/M2 | OXYGEN SATURATION: 99 %

## 2024-10-08 DIAGNOSIS — Z02.89 ENCOUNTER FOR COMPLETION OF FORM WITH PATIENT: Primary | ICD-10-CM

## 2024-10-08 PROCEDURE — 99214 OFFICE O/P EST MOD 30 MIN: CPT | Performed by: FAMILY MEDICINE

## 2024-10-08 ASSESSMENT — PAIN SCALES - GENERAL: PAINLEVEL: NO PAIN (0)

## 2025-02-25 NOTE — TELEPHONE ENCOUNTER
Controlled Substance Refill Request  Medication Name:   Requested Prescriptions     Pending Prescriptions Disp Refills     traMADoL (ULTRAM) 50 mg tablet 12 tablet 0     Sig: Take 1 tablet (50 mg total) by mouth every 6 (six) hours as needed for severe pain (7-10) (knee pain).     Date Last Fill: 6/22/21  [facility]ed [facility]  Submit electronically to pharmacy  Controlled Substance Agreement on file:   Encounter-Level CSA Scan Date:    There are no encounter-level csa scan date.        Last office visit:  6/22/21  Priya Marshall RN, MA  [facility]    Triage Nurse Advisor      
Pt working with ortho - they will decide what medications are appropriate     Dr Vo  
Telephone Encounter by Portia Putnam CMA at 6/29/2021  3:47 PM     Author: Portia Putnam CMA Service: -- Author Type: Certified Medical Assistant    Filed: 6/29/2021  3:48 PM Encounter Date: 6/26/2021 Status: Signed    : Portia Putnam CMA (Certified Medical Assistant)       Pt stated it was a mistake, he doesn't need that med       
Yes

## 2025-02-26 NOTE — TELEPHONE ENCOUNTER
February 27, 2025       Syl Mcmullen MD  [address] [address]  Via In Basket      Patient: Sandeep Sanchez III   YOB: 2021   Date of Visit: 2/26/2025       Dear Dr. Mcmullen:    I saw your patient, Sandeep Sanchez, for an evaluation. Below are my notes for this visit with him.    If you have questions, please do not hesitate to call me.      Sincerely,        Gabe Mota MD        CC: No Recipients  Gabe Mota MD  2/27/2025  7:12 AM  Signed  Reason For Visit  Sandeep Sanchez III is a(n) 3 year old male here today for a Pediatric ENT consultation for No chief complaint on file.   . Sandeep Sanchez III is accompanied by parent.      Referred By  Patient was referred by Syl Mcmullen MD.     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    Had recent left CI placement on 6-25-24. Previous DLB and ear tube placement on 1-5-24.    Was seen last 2/17/25. \"Patient has left serous effusion and an ongoing right ear infection with a tube in place.  In general, the right ear looks better, but we will continue Ciprodex and send a culture today.  We can decide about additional antibiotics systemically with the results of that culture, both 2 months.  Place to see if he either has ongoing effusion on the left when he returns.     We would not plan on any airway surgery until he is off the vent, but I will examine his airway under anesthesia if he needs tube replacement (last DLB 1/24).\"    On ciprodex drops.   Patient was seen by infectious disease, they reviewed the cultures.  They feel like the E. coli is likely a contaminant.  They advised  repeat exam, possible culture if there is active infection.  Drainage has stopped per team.    Review of Systems  10 systems were reviewed and  Duplicate - prescription request refused.   are negative aside from those detailed above.     Past Medical History  Significant past medical history:   Past Medical History:   Diagnosis Date   • ASD (atrial septal defect) (CMD)    • Axenfeld-Liz syndrome    • Bardet-Biedl syndrome (CMD)    • Dysphagia    • Encounter for coordination of complex care 12/09/2021   • Encounter for routine child health examination with abnormal findings 12/09/2021   • Feeding by G-tube  (CMD)    • Hip dysplasia (CMD)    • Klinefelter syndrome (CMD)    • PDA (patent ductus arteriosus) (CMD)    • PFO (patent foramen ovale) (CMD)    • RSV (acute bronchiolitis due to respiratory syncytial virus) 04/12/2023   • Small for gestational age (SGA) (CMD) 2020   • Tracheostomy care  (CMD)    • Tracheostomy dependent  (CMD)    • Ventilator dependent  (CMD)    • Vocal cord paralysis      Plan I didSurgical History  Significant past surgical history:   Past Surgical History:   Procedure Laterality Date   • Bronchoscopy  05/26/2021   • Gastrostomy tube placement  2020   • Hb excision skin tags up to 15  2020        • Laryngoscopy  2020   • Tracheostomy tube placement  2020     Social History  Custody status: Parents have full custody of the patient:  yes     Current Meds  Current Outpatient Medications   Medication Sig Dispense Refill   • ciprofloxacin-dexamethasone (Ciprodex) otic suspension Place 4 drops into right ear in the morning and 4 drops in the evening. Do all this for 10 days. 15 mL 0   • ipratropium (ATROVENT) 0.02 % nebulizer solution Take 2.5 mLs by nebulization 2 times daily as needed for Wheezing.     • ofloxacin (FLOXIN) 0.3 % otic solution Place 5 drops into right ear in the morning and 5 drops in the evening. 5 mL 0   • Lansoprazole (PREVACID) 15 MG disintegrating tablet 1 tab in 5 ml per g-tube daily     • diphenhydrAMINE (BENADRYL) 12.5 MG/5ML liquid 6.25 mg by Per G Tube route every 6 hours as needed for Itching (rash).     • ibuprofen  (CHILDRENS MOTRIN,ADVIL) 100 MG/5ML suspension 100 mg by Per G Tube route every 4 hours as needed for Fever or Pain. 100mg = 5mL (Patient not taking: Reported on 2/26/2025)     • VITAMIN E EX Apply 1 spray topically daily as needed (eczema rash).     • hydroCORTisone (CORTIZONE) 2.5 % cream Apply 1 Application topically daily as needed for Rash or Itching.     • glycopyrrolate (ROBINUL) 1 MG tablet 0.5 tablets by Per G Tube route in the morning and 0.5 tablets in the evening.     • famotidine (PEPCID) 40 MG/5ML suspension Take 1.3 mLs by mouth in the morning and 1.3 mLs in the evening. Per g tube 30 mL 3   • polyethylene glycol (MIRALAX) 17 g packet Take 3 g by mouth every other day. Stir and dissolve powder in any 4 to 8 ounces of beverage, then drink. Per g-tube 15 packet 3   • acetaminophen (TYLENOL) 160 MG/5ML elixir 135 mg every 4 hours as needed for Fever. Per G tube     • Albuterol Sulfate 2.5 MG/0.5ML Nebu Soln Inhale 2.5 mg into the lungs Every 4 hours as needed (shortness of breath, wheezing).     • Glycerin, Laxative, (Glycerin, Infants & Children,) 1.2 g Suppos Place 0.5 suppositories rectally daily as needed (constipation).     • cholecalciferol (VITAMIN D) 10 mcg (400 units)/mL oral liquid Take 1 mL by mouth daily. (Patient not taking: Reported on 2/26/2025) 60 mL 12   • fluticasone propionate (Flovent HFA) 44 MCG/ACT inhaler Inhale 2 puffs into the lungs 2 times daily. 10.6 g 3   • pediatric multivitamin with iron (POLY-VI-SOL WITH IRON) 11 MG/ML Solution 1 mL by Per G Tube route every 24 hours. (Patient not taking: Reported on 2/26/2025) 50 mL 12   • ZINC OXIDE EX Apply 1 Application topically as needed for Skin Protectant. For skin irritation       No current facility-administered medications for this visit.     Can take antianxiety medication  Vitals      Visit Vitals  Temp 97.2 °F (36.2 °C) (Temporal)   Wt 13.9 kg (30 lb 10.3 oz)   Bedside  Physical Exam  General:  The patient is awake, alert,  NAD.  Head/Face:  Atraumatic, normocephalic. No dysmorphic features.  Eyes:  Conjunctiva normal.   Gaze conjugate.   Ears:  Intact left eardrum with serous middle ear fluid no infection.    Right tube in place with granulation tissue and small amount of purulent fluid.  Middle ear is mostly aerated    Left cochlear implant site healthy, no swelling, overlying skin normal  Nose:   Anterior rhinoscopy clear  Oral Cavity/  Oropharynx:  Mucosa smooth difficult exam  Larynx:   No stridor. Normal voice.  Neck:  Trach site stable, with hypertrophic scar inferior to and separate from the stoma    Assessment  Problem List Items Addressed This Visit          ENT    Tracheostomy in place  (CMD) - Primary     Other Visit Diagnoses       Otorrhea of right ear                Discussion/Summary   This patient was scheduled for airway evaluation in several months, but he has ongoing middle ear infection on the right side and myringitis.  I think that is related to a retained tube and granulation tissue.  I do not think he needs additional antibiotic treatment at this point-instead, I advised removing that tube and replacing tubes bilaterally, based on the fact that he has serous middle ear fluid on the left.  This should be done urgently because he is status post cochlear implant on the left.  I will check his airway at the same time.     Gabe Mota MD

## 2025-03-20 DIAGNOSIS — M47.816 SPONDYLOSIS OF LUMBAR REGION WITHOUT MYELOPATHY OR RADICULOPATHY: ICD-10-CM

## 2025-03-20 DIAGNOSIS — M51.26 LUMBAR DISC HERNIATION: ICD-10-CM

## 2025-03-20 DIAGNOSIS — M54.16 LUMBAR RADICULITIS: ICD-10-CM

## 2025-03-20 RX ORDER — CYCLOBENZAPRINE HCL 10 MG
TABLET ORAL
Qty: 60 TABLET | Refills: 3 | Status: SHIPPED | OUTPATIENT
Start: 2025-03-20

## 2025-05-19 NOTE — PROGRESS NOTES
Assessment/Plan:   Jaime is a 55 year old male here for physical with additional concerns    Routine adult health maintenance  Physical completed today.  Labs as below.  Up-to-date with immunizations.  Cologuard ordered today.  - CBC with platelets  - Vitamin D Deficiency    Morbid obesity (H)  BMI 48.66 today.  Comorbidities include hypertension, osteoarthritis.  Patient expresses interest in possible weight loss medication including GLP-1 injectable, will check labs and then sent to pharmacy to check about insurance coverage.    Essential Hypertension  History of hypertension, blood pressure elevated today but patient reports he has not been regularly taking his lisinopril, we discussed importance of this medication daily, refill provided today.  BMP checked today.  If continues to be uncontrolled would consider adding hydrochlorothiazide or amlodipine.  - Basic metabolic panel  (Ca, Cl, CO2, Creat, Gluc, K, Na, BUN)  - lisinopril (ZESTRIL) 40 MG tablet  Dispense: 90 tablet; Refill: 3    Lipid screening  Given age and weight, will check lipid panel today.  - Lipid panel reflex to direct LDL Non-fasting    Diabetes mellitus screening  Given age and weight, will screen for diabetes today.  - Hemoglobin A1c    Screening for prostate cancer  Due for prostate cancer screening.  - PSA, screen    Encounter for completion of form with patient  Patient has form for [facility] support [place] request for medical status as well as handicap parking form, these were completed today.    Sebaceous cyst  Skin lesion  Patient with a large sebaceous cyst on posterior right scalp and skin lesion in lower back that need removal, referral to dermatology.  - Adult Dermatology  Referral    Dandruff  Patient reports significant dandruff on scalp, will trial ketoconazole shampoo.  - ketoconazole (NIZORAL) 2 % external shampoo  Dispense: 120 mL; Refill: 3    Chronic bilateral low back pain, unspecified whether sciatica  present  Fibromyalgia  Other chronic pain  Patient with chronic pain, interested in restarting naproxen as needed for pain control, this was sent to his pharmacy.  He is also interested in restarting Toradol IM, discussed I would recommend doing this through pain clinic, referral at this time (previously seen at [facility]).  - Pain Management  Referral  - naproxen (NAPROSYN) 500 MG tablet  Dispense: 30 tablet; Refill: 11    Colon cancer screening  Due for colon cancer screening, patient prefers Cologuard which was ordered today.  - COLOGUARD([employer])       I have had an Advance Directives discussion with the patient.  The following high BMI interventions were performed this visit: encouragement to exercise and lifestyle education regarding diet    Follow up: 1 year for physical, sooner as needed    Harriet Vo MD  [facility]      Subjective:     Shaun Calvo Jr. is a 55 year old male who presents for an annual exam.     Question 5/21/2025  1:42 PM CDT - Filed by Qamar Serrano MA   The following questionnaire should only be answered by the patient. Are you the patient? Yes        Over the last 2 weeks, how often have you been bothered by any of the following problems?    1. Little interest or pleasure in doing things Nearly every day   2.  Feeling down, depressed, or hopeless More than half the days   3.  Trouble falling or staying asleep, or sleeping too much More than half the days   4.  Feeling tired or having little energy More than half the days   5.  Poor appetite or overeating More than half the days   6.  Feeling bad about yourself - or that you are a failure or have let yourself or your family down Several days   7.  Trouble concentrating on things, such as reading the newspaper or watching television Several days   8.  Moving or speaking so slowly that other people could have noticed. Or the opposite - being so fidgety or restless that you have been moving  around a lot more than usual Not at all   9.  Thoughts that you would be better off dead, or of hurting yourself in some way Not at all   If you checked off any problems, how difficult have these problems made it for you to do your work, take care of things at home, or get along with other people? Very difficult        PHQ9 TOTAL SCORE (range: 0 - 27) 13 (Moderate depression)     Question 5/21/2025  1:42 PM CDT - Filed by Qamar Serrano MA   The following questionnaire should only be answered by the patient. Are you the patient? Yes   BRITTNEY-7(Used with permission: [employer] 2002)    Over the last two weeks, how often have you been bothered by the following problems?    1. Feeling nervous, anxious, or on edge Several days   2. Not being able to stop or control worrying Several days   3. Worrying too much about different things More than half the days   4. Trouble relaxing Several days   5. Being so restless that it is hard to sit still Several days   6. Becoming easily annoyed or irritable Several days   7. Feeling afraid, as if something awful might happen Several days   If you checked off any problems on this questionnaire, how difficult have these problems made it for you to do your work, take care of things at home, or get along with other people? Somewhat difficult   BRITTNEY-7(Used with permission: [employer] 2002)    BRITTNEY 7 TOTAL SCORE (range: 0 - 21) 8 (mild anxiety)     Question 5/21/2025  1:43 PM CDT - Filed by Qamar Serrano MA   In general, how would you rate your overall physical health? (!) FAIR   Do you get at least 3 servings of foods that have calcium each day (dairy, green leafy vegetables, etc)? (!) I DON'T KNOW   Do you have a special diet? I don't know   How many servings of fruits and vegetables do you eat daily? (!) I DON'T KNOW   On average, how many sweetened beverages do you drink each day (Examples: soda, juice, sweet tea, etc.)? Do NOT count diet or artificially sweetened beverages. (!) I DON'T KNOW    On average, how many days per week do you engage in moderate to strenuous exercise (like a brisk walk)? 0 days   On average, how many minutes do you engage in exercise at this level? Patient declined   How often do you get together with friends or relatives? Never   Have you fallen 2 or more times in the past year? No   Trouble with walking or balance? No   Do you see a dentist two times every year? (!) DECLINE   Do you feel stress - tense, restless, nervous, or anxious, or unable to sleep at night because your mind is troubled all the time - these days? Not at all   If you drink alcohol, do you typically have more than 3 drinks per day OR more than 7 drinks per week? Not Applicable   Do you use any substances not prescribed by a provider, out of habit or for other reasons? No   Have you had any new sexual partners since you were last tested for sexually transmitted infections, including HIV? No   Within the past 12 months, did you worry that your food would run out before you got money to buy more? No   Within the past 12 months, did the food you bought just not last and you didn t have money to get more? No   Do you have housing? (Housing is defined as stable permanent housing and does not include staying outside in a car, in a tent, in an abandoned building, in an overnight shelter, or couch-surfing.) Yes   Are you worried about losing your housing? No   Within the past 12 months, has lack of transportation kept you from medical appointments, getting your medicines, non-medical meetings or appointments, work, or from getting things that you need? No   Within the past 12 months, have you or your family members you live with been unable to get utilities (heat, electricity) when it was really needed? No     Question 5/21/2025  1:43 PM CDT - Filed by Qamar Serrano MA   1. In the past 4 weeks, how much of the time did your asthma keep you from getting as much done at work, school or at home? A little of the time   2.  During the past 4 weeks, how often have you had shortness of breath? Once or twice a week   3. During the past 4 weeks, how often did your asthma symptoms (wheezing, coughing, shortness of breath, chest tightness or pain) wake you up at night or earlier than usual in the morning? Once or twice   4. During the past 4 weeks, how often have you used your rescue inhaler or nebulizer medication (such as albuterol)? Once a week or less   5. How would you rate your asthma control during the past 4 weeks? Somewhat controlled   Asthma Control Test Total Score (range: 5 - 25) 19     Question 5/21/2025  1:43 PM CDT - Filed by Qamar Serrano MA   In the past 12 months, how many times did you visit the emergency room for your asthma without being admitted to the hospital? None   In the past 12 months, how many times were you hospitalized overnight because of your asthma? None     Cyst R posterior scalp/neck - getting larger and bothering him - skin lesion on low back  Forms to complete  Dandruff   Wondering about handicap parking form    Health Maintenance reviewed:  Lipid Profile: yes  Glucose Screen: yes  Colonoscopy: due    Immunization History   Administered Date(s) Administered    COVID-19 Bivalent 18+ (Moderna) 11/08/2022    COVID-19 Monovalent 18+ (Moderna) 05/15/2021, 06/12/2021    TD,PF 7+ (Tenivac) 05/10/2014    TDAP (Adacel,Boostrix) 11/29/2019     Immunization status: up to date and documented.    Current Outpatient Medications   Medication Sig Dispense Refill    albuterol (VENTOLIN HFA) 108 (90 Base) MCG/ACT inhaler INHALE 2 PUFFS BY MOUTH EVERY 4 HOURS AS NEEDED 54 g 3    ciclopirox (PENLAC) 8 % external solution Apply to adjacent skin and affected nails daily.  Remove with alcohol every 7 days, then repeat. 6.6 mL 3    cyclobenzaprine (FLEXERIL) 10 MG tablet TAKE 1 TABLET(10 MG) BY MOUTH THREE TIMES DAILY AS NEEDED FOR MUSCLE SPASMS 60 tablet 3    fluticasone (FLOVENT HFA) 110 MCG/ACT inhaler Inhale 1 puff into the  lungs 2 times daily 12 g 2    ketoconazole (NIZORAL) 2 % external shampoo Apply topically daily as needed for itching or irritation. 120 mL 3    lisinopril (ZESTRIL) 40 MG tablet Take 1 tablet (40 mg) by mouth daily. 90 tablet 3    medical cannabis (Patient's own supply) See Admin Instructions (The purpose of this order is to document that the patient reports taking medical cannabis.  This is not a prescription, and is not used to certify that the patient has a qualifying medical condition.)  Vaping Oil andFlower leaf; 3 puffs inhaled every 3 hours as needed      naproxen (NAPROSYN) 500 MG tablet TAKE 1 TABLET(500 MG) BY MOUTH TWICE DAILY WITH MEALS AS NEEDED FOR PAIN 30 tablet 11    omeprazole (PRILOSEC) 20 MG capsule Take 20 mg by mouth every morning OTC       Past Medical History:   Diagnosis Date    Anxiety     Arthritis     Asthma     Atrial fibrillation (H)     COPD (chronic obstructive pulmonary disease) (H)     Depression     Depressive disorder     Hypertension      Past Surgical History:   Procedure Laterality Date    ORTHOPEDIC SURGERY       Penicillins  Family History   Problem Relation Age of Onset    Heart Disease Father      Social History     Socioeconomic History    Marital status:      Spouse name: Not on file    Number of children: Not on file    Years of education: Not on file    Highest education level: Not on file   Occupational History    Not on file   Tobacco Use    Smoking status: Former     Current packs/day: 0.00     Average packs/day: 0.5 packs/day for 20.0 years (10.0 ttl pk-yrs)     Types: Cigarettes     Start date:      Quit date:      Years since quittin.4    Smokeless tobacco: Never   Vaping Use    Vaping status: Never Used   Substance and Sexual Activity    Alcohol use: Not on file    Drug use: Not on file    Sexual activity: Yes   Other Topics Concern    Not on file   Social History Narrative    The patient is originally from [place], [place] and  went to [employer]. He did not graduate. He has worked as a  for much of his life. 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 Af ter he was released from nursing home, he looked for work in [place], [place] but could not find any and subsequently returned to [place]. He is currently living at the [facility]. He  in 2004. He has a daughter, Mayra Valdez, who is 23 and  with whom he has no contact.       Social Drivers of Health     Financial Resource Strain: Low Risk  (5/21/2025)    Financial Resource Strain     Within the past 12 months, have you or your family members you live with been unable to get utilities (heat, electricity) when it was really needed?: No   Food Insecurity: Low Risk  (5/21/2025)    Food Insecurity     Within the past 12 months, did you worry that your food would run out before you got money to buy more?: No     Within the past 12 months, did the food you bought just not last and you didn t have money to get more?: No   Transportation Needs: Low Risk  (5/21/2025)    Transportation Needs     Within the past 12 months, has lack of transportation kept you from medical appointments, getting your medicines, non-medical meetings or appointments, work, or from getting things that you need?: No   Physical Activity: Unknown (5/21/2025)    Exercise Vital Sign     Days of Exercise per Week: 0 days     Minutes of Exercise per Session: Patient declined   Stress: No Stress Concern Present (5/21/2025)    [place] [employer] - Occupational Stress Questionnaire     Feeling of Stress : Not at all   Social Connections: Unknown (5/21/2025)    Social Connection and Isolation Panel [NHANES]     Frequency of Communication with Friends and Family: Not on file     Frequency of Social Gatherings with Friends and Family: Never     Attends Nondenominational Services: Not on file     Active Member of Clubs or Organizations: Not  "on file     Attends Club or Organization Meetings: Not on file     Marital Status: Not on file   Interpersonal Safety: Low Risk  (5/21/2025)    Interpersonal Safety     Do you feel physically and emotionally safe where you currently live?: Yes     Within the past 12 months, have you been hit, slapped, kicked or otherwise physically hurt by someone?: No     Within the past 12 months, have you been humiliated or emotionally abused in other ways by your partner or ex-partner?: No   Housing Stability: Low Risk  (5/21/2025)    Housing Stability     Do you have housing? : Yes     Are you worried about losing your housing?: No       Review of Systems negative unless noted    Objective:        Vitals:    05/21/25 1344 05/21/25 1349   BP: (!) 172/100 (!) 160/94   Pulse: 96    Resp: 22    Temp: 97.6  F (36.4  C)    TempSrc: Temporal    SpO2: 100%    Weight: (!) 171.9 kg (379 lb)    Height: 1.88 m (6' 2\")      Body mass index is 48.66 kg/m .    Physical Exam:  General Appearance: Alert, pleasant, appears stated age, obese  Head: Normocephalic, without obvious abnormality  Eyes: PERRL, conjunctiva/corneas clear, EOM's intact  Ears: Normal TM's and external ear canals, both ears  Nose: Nares normal, septum midline,mucosa normal, no drainage  Throat: Lips, mucosa, and tongue normal; teeth and gums normal; oropharynx is clear  Neck: Supple,without lymphadenopathy, no thyromegaly or nodules noted  Lungs: Clear to auscultation bilaterally, respirations unlabored, no wheezing or crackles  Heart: Regular rate and rhythm, no murmur   Abdomen: Soft, non-tender, no masses, no organomegaly  Extremities: Extremities with strong and symmetric pulses, no cyanosis or edema  Skin: Cyst posterior right scalp, skin lesion lower back  Neurologic: Grossly normal, no focal deficits    "

## 2025-05-21 ENCOUNTER — ORDERS ONLY (AUTO-RELEASED) (OUTPATIENT)
Dept: FAMILY MEDICINE | Facility: CLINIC | Age: 55
End: 2025-05-21

## 2025-05-21 ENCOUNTER — RESULTS FOLLOW-UP (OUTPATIENT)
Dept: FAMILY MEDICINE | Facility: CLINIC | Age: 55
End: 2025-05-21

## 2025-05-21 ENCOUNTER — OFFICE VISIT (OUTPATIENT)
Dept: FAMILY MEDICINE | Facility: CLINIC | Age: 55
End: 2025-05-21
Payer: COMMERCIAL

## 2025-05-21 VITALS
RESPIRATION RATE: 22 BRPM | DIASTOLIC BLOOD PRESSURE: 94 MMHG | WEIGHT: 315 LBS | BODY MASS INDEX: 40.43 KG/M2 | HEART RATE: 96 BPM | SYSTOLIC BLOOD PRESSURE: 160 MMHG | HEIGHT: 74 IN | OXYGEN SATURATION: 100 % | TEMPERATURE: 97.6 F

## 2025-05-21 DIAGNOSIS — L98.9 SKIN LESION: ICD-10-CM

## 2025-05-21 DIAGNOSIS — Z02.89 ENCOUNTER FOR COMPLETION OF FORM WITH PATIENT: ICD-10-CM

## 2025-05-21 DIAGNOSIS — G89.29 CHRONIC BILATERAL LOW BACK PAIN, UNSPECIFIED WHETHER SCIATICA PRESENT: ICD-10-CM

## 2025-05-21 DIAGNOSIS — M54.50 CHRONIC BILATERAL LOW BACK PAIN, UNSPECIFIED WHETHER SCIATICA PRESENT: ICD-10-CM

## 2025-05-21 DIAGNOSIS — I10 ESSENTIAL HYPERTENSION: ICD-10-CM

## 2025-05-21 DIAGNOSIS — Z13.1 DIABETES MELLITUS SCREENING: ICD-10-CM

## 2025-05-21 DIAGNOSIS — Z12.5 SCREENING FOR PROSTATE CANCER: ICD-10-CM

## 2025-05-21 DIAGNOSIS — L72.3 SEBACEOUS CYST: ICD-10-CM

## 2025-05-21 DIAGNOSIS — Z12.11 COLON CANCER SCREENING: ICD-10-CM

## 2025-05-21 DIAGNOSIS — Z00.00 ROUTINE ADULT HEALTH MAINTENANCE: Primary | ICD-10-CM

## 2025-05-21 DIAGNOSIS — L21.0 DANDRUFF: ICD-10-CM

## 2025-05-21 DIAGNOSIS — R73.03 PREDIABETES: ICD-10-CM

## 2025-05-21 DIAGNOSIS — G89.29 OTHER CHRONIC PAIN: ICD-10-CM

## 2025-05-21 DIAGNOSIS — E55.9 VITAMIN D DEFICIENCY: ICD-10-CM

## 2025-05-21 DIAGNOSIS — Z13.220 LIPID SCREENING: ICD-10-CM

## 2025-05-21 DIAGNOSIS — E66.01 MORBID OBESITY (H): Primary | ICD-10-CM

## 2025-05-21 DIAGNOSIS — M79.7 FIBROMYALGIA: ICD-10-CM

## 2025-05-21 DIAGNOSIS — E66.01 MORBID OBESITY (H): ICD-10-CM

## 2025-05-21 LAB
ERYTHROCYTE [DISTWIDTH] IN BLOOD BY AUTOMATED COUNT: 13 % (ref 10–15)
EST. AVERAGE GLUCOSE BLD GHB EST-MCNC: 131 MG/DL
HBA1C MFR BLD: 6.2 % (ref 0–5.6)
HCT VFR BLD AUTO: 38.4 % (ref 40–53)
HGB BLD-MCNC: 12.7 G/DL (ref 13.3–17.7)
MCH RBC QN AUTO: 27.4 PG (ref 26.5–33)
MCHC RBC AUTO-ENTMCNC: 33.1 G/DL (ref 31.5–36.5)
MCV RBC AUTO: 83 FL (ref 78–100)
PLATELET # BLD AUTO: 221 10E3/UL (ref 150–450)
RBC # BLD AUTO: 4.64 10E6/UL (ref 4.4–5.9)
WBC # BLD AUTO: 7.8 10E3/UL (ref 4–11)

## 2025-05-21 PROCEDURE — 3077F SYST BP >= 140 MM HG: CPT | Performed by: FAMILY MEDICINE

## 2025-05-21 PROCEDURE — 80048 BASIC METABOLIC PNL TOTAL CA: CPT | Performed by: FAMILY MEDICINE

## 2025-05-21 PROCEDURE — 99396 PREV VISIT EST AGE 40-64: CPT | Performed by: FAMILY MEDICINE

## 2025-05-21 PROCEDURE — 83036 HEMOGLOBIN GLYCOSYLATED A1C: CPT | Performed by: FAMILY MEDICINE

## 2025-05-21 PROCEDURE — 3080F DIAST BP >= 90 MM HG: CPT | Performed by: FAMILY MEDICINE

## 2025-05-21 PROCEDURE — 99214 OFFICE O/P EST MOD 30 MIN: CPT | Mod: 25 | Performed by: FAMILY MEDICINE

## 2025-05-21 PROCEDURE — G2211 COMPLEX E/M VISIT ADD ON: HCPCS | Performed by: FAMILY MEDICINE

## 2025-05-21 PROCEDURE — G0103 PSA SCREENING: HCPCS | Performed by: FAMILY MEDICINE

## 2025-05-21 PROCEDURE — 80061 LIPID PANEL: CPT | Performed by: FAMILY MEDICINE

## 2025-05-21 PROCEDURE — 36415 COLL VENOUS BLD VENIPUNCTURE: CPT | Performed by: FAMILY MEDICINE

## 2025-05-21 PROCEDURE — 85027 COMPLETE CBC AUTOMATED: CPT | Performed by: FAMILY MEDICINE

## 2025-05-21 PROCEDURE — 82306 VITAMIN D 25 HYDROXY: CPT | Performed by: FAMILY MEDICINE

## 2025-05-21 RX ORDER — KETOCONAZOLE 20 MG/ML
SHAMPOO, SUSPENSION TOPICAL DAILY PRN
Qty: 120 ML | Refills: 3 | Status: SHIPPED | OUTPATIENT
Start: 2025-05-21

## 2025-05-21 RX ORDER — NAPROXEN 500 MG/1
TABLET ORAL
Qty: 30 TABLET | Refills: 11 | Status: SHIPPED | OUTPATIENT
Start: 2025-05-21

## 2025-05-21 RX ORDER — LISINOPRIL 40 MG/1
40 TABLET ORAL DAILY
Qty: 90 TABLET | Refills: 3 | Status: SHIPPED | OUTPATIENT
Start: 2025-05-21

## 2025-05-21 SDOH — HEALTH STABILITY: PHYSICAL HEALTH: ON AVERAGE, HOW MANY MINUTES DO YOU ENGAGE IN EXERCISE AT THIS LEVEL?: PATIENT DECLINED

## 2025-05-21 SDOH — HEALTH STABILITY: PHYSICAL HEALTH: ON AVERAGE, HOW MANY DAYS PER WEEK DO YOU ENGAGE IN MODERATE TO STRENUOUS EXERCISE (LIKE A BRISK WALK)?: 0 DAYS

## 2025-05-21 ASSESSMENT — ANXIETY QUESTIONNAIRES
8. IF YOU CHECKED OFF ANY PROBLEMS, HOW DIFFICULT HAVE THESE MADE IT FOR YOU TO DO YOUR WORK, TAKE CARE OF THINGS AT HOME, OR GET ALONG WITH OTHER PEOPLE?: SOMEWHAT DIFFICULT
5. BEING SO RESTLESS THAT IT IS HARD TO SIT STILL: SEVERAL DAYS
7. FEELING AFRAID AS IF SOMETHING AWFUL MIGHT HAPPEN: SEVERAL DAYS
IF YOU CHECKED OFF ANY PROBLEMS ON THIS QUESTIONNAIRE, HOW DIFFICULT HAVE THESE PROBLEMS MADE IT FOR YOU TO DO YOUR WORK, TAKE CARE OF THINGS AT HOME, OR GET ALONG WITH OTHER PEOPLE: SOMEWHAT DIFFICULT
6. BECOMING EASILY ANNOYED OR IRRITABLE: SEVERAL DAYS
GAD7 TOTAL SCORE: 8
GAD7 TOTAL SCORE: 8
2. NOT BEING ABLE TO STOP OR CONTROL WORRYING: SEVERAL DAYS
1. FEELING NERVOUS, ANXIOUS, OR ON EDGE: SEVERAL DAYS
3. WORRYING TOO MUCH ABOUT DIFFERENT THINGS: MORE THAN HALF THE DAYS
7. FEELING AFRAID AS IF SOMETHING AWFUL MIGHT HAPPEN: SEVERAL DAYS
4. TROUBLE RELAXING: SEVERAL DAYS
GAD7 TOTAL SCORE: 8

## 2025-05-21 ASSESSMENT — SOCIAL DETERMINANTS OF HEALTH (SDOH): HOW OFTEN DO YOU GET TOGETHER WITH FRIENDS OR RELATIVES?: NEVER

## 2025-05-21 ASSESSMENT — ASTHMA QUESTIONNAIRES
ACT_TOTALSCORE: 19
QUESTION_4 LAST FOUR WEEKS HOW OFTEN HAVE YOU USED YOUR RESCUE INHALER OR NEBULIZER MEDICATION (SUCH AS ALBUTEROL): ONCE A WEEK OR LESS
QUESTION_2 LAST FOUR WEEKS HOW OFTEN HAVE YOU HAD SHORTNESS OF BREATH: ONCE OR TWICE A WEEK
QUESTION_3 LAST FOUR WEEKS HOW OFTEN DID YOUR ASTHMA SYMPTOMS (WHEEZING, COUGHING, SHORTNESS OF BREATH, CHEST TIGHTNESS OR PAIN) WAKE YOU UP AT NIGHT OR EARLIER THAN USUAL IN THE MORNING: ONCE OR TWICE
QUESTION_5 LAST FOUR WEEKS HOW WOULD YOU RATE YOUR ASTHMA CONTROL: SOMEWHAT CONTROLLED
QUESTION_1 LAST FOUR WEEKS HOW MUCH OF THE TIME DID YOUR ASTHMA KEEP YOU FROM GETTING AS MUCH DONE AT WORK, SCHOOL OR AT HOME: A LITTLE OF THE TIME

## 2025-05-22 ENCOUNTER — PATIENT OUTREACH (OUTPATIENT)
Dept: CARE COORDINATION | Facility: CLINIC | Age: 55
End: 2025-05-22
Payer: COMMERCIAL

## 2025-05-22 LAB
ANION GAP SERPL CALCULATED.3IONS-SCNC: 11 MMOL/L (ref 7–15)
BUN SERPL-MCNC: 16.5 MG/DL (ref 6–20)
CALCIUM SERPL-MCNC: 10.5 MG/DL (ref 8.8–10.4)
CHLORIDE SERPL-SCNC: 104 MMOL/L (ref 98–107)
CHOLEST SERPL-MCNC: 179 MG/DL
CREAT SERPL-MCNC: 1.23 MG/DL (ref 0.67–1.17)
EGFRCR SERPLBLD CKD-EPI 2021: 69 ML/MIN/1.73M2
FASTING STATUS PATIENT QL REPORTED: ABNORMAL
FASTING STATUS PATIENT QL REPORTED: ABNORMAL
GLUCOSE SERPL-MCNC: 123 MG/DL (ref 70–99)
HCO3 SERPL-SCNC: 27 MMOL/L (ref 22–29)
HDLC SERPL-MCNC: 42 MG/DL
LDLC SERPL CALC-MCNC: 101 MG/DL
NONHDLC SERPL-MCNC: 137 MG/DL
POTASSIUM SERPL-SCNC: 3.5 MMOL/L (ref 3.4–5.3)
PSA SERPL DL<=0.01 NG/ML-MCNC: 0.59 NG/ML (ref 0–3.5)
SODIUM SERPL-SCNC: 142 MMOL/L (ref 135–145)
TRIGL SERPL-MCNC: 178 MG/DL
VIT D+METAB SERPL-MCNC: 11 NG/ML (ref 20–50)

## 2025-05-22 RX ORDER — ERGOCALCIFEROL 1.25 MG/1
50000 CAPSULE, LIQUID FILLED ORAL WEEKLY
Qty: 8 CAPSULE | Refills: 0 | Status: SHIPPED | OUTPATIENT
Start: 2025-05-22 | End: 2025-07-11

## 2025-05-26 ENCOUNTER — PATIENT OUTREACH (OUTPATIENT)
Dept: CARE COORDINATION | Facility: CLINIC | Age: 55
End: 2025-05-26
Payer: COMMERCIAL

## 2025-06-09 ENCOUNTER — PATIENT OUTREACH (OUTPATIENT)
Dept: CARE COORDINATION | Facility: CLINIC | Age: 55
End: 2025-06-09
Payer: COMMERCIAL

## 2025-06-11 ENCOUNTER — PATIENT OUTREACH (OUTPATIENT)
Dept: CARE COORDINATION | Facility: CLINIC | Age: 55
End: 2025-06-11
Payer: COMMERCIAL